# Patient Record
Sex: MALE | Race: BLACK OR AFRICAN AMERICAN | Employment: UNEMPLOYED | URBAN - METROPOLITAN AREA
[De-identification: names, ages, dates, MRNs, and addresses within clinical notes are randomized per-mention and may not be internally consistent; named-entity substitution may affect disease eponyms.]

---

## 2017-02-21 ENCOUNTER — OFFICE VISIT (OUTPATIENT)
Dept: INTERNAL MEDICINE CLINIC | Age: 52
End: 2017-02-21

## 2017-02-21 VITALS
DIASTOLIC BLOOD PRESSURE: 85 MMHG | OXYGEN SATURATION: 99 % | HEIGHT: 66 IN | HEART RATE: 74 BPM | BODY MASS INDEX: 27.16 KG/M2 | WEIGHT: 169 LBS | TEMPERATURE: 98.5 F | SYSTOLIC BLOOD PRESSURE: 131 MMHG | RESPIRATION RATE: 16 BRPM

## 2017-02-21 DIAGNOSIS — Z79.01 ANTICOAGULANT LONG-TERM USE: Primary | ICD-10-CM

## 2017-02-21 DIAGNOSIS — I10 ESSENTIAL HYPERTENSION: ICD-10-CM

## 2017-02-21 DIAGNOSIS — Z86.718 HISTORY OF DVT (DEEP VEIN THROMBOSIS): ICD-10-CM

## 2017-02-21 DIAGNOSIS — Z51.81 SUBTHERAPEUTIC ANTICOAGULATION: ICD-10-CM

## 2017-02-21 DIAGNOSIS — M79.604 PAIN OF RIGHT LOWER EXTREMITY: ICD-10-CM

## 2017-02-21 DIAGNOSIS — Z79.01 SUBTHERAPEUTIC ANTICOAGULATION: ICD-10-CM

## 2017-02-21 DIAGNOSIS — Z91.199 NONCOMPLIANCE: ICD-10-CM

## 2017-02-21 LAB
D DIMER PPP FEU-MCNC: <0.2 MG/L FEU (ref 0–0.49)
INR BLD: 1.8
PT POC: NORMAL SEC
VALID INTERNAL CONTROL?: YES

## 2017-02-21 NOTE — PROGRESS NOTES
Chief Complaint   Patient presents with    Anticoagulation     6mo f/u (Rm #7)    Leg Pain     right leg pain     Results for orders placed or performed in visit on 02/21/17   AMB POC PT/INR   Result Value Ref Range    VALID INTERNAL CONTROL POC Yes     Prothrombin time (POC)  sec    INR POC 1.8

## 2017-02-21 NOTE — PROGRESS NOTES
Chief Complaint   Patient presents with    Anticoagulation     6mo f/u (Rm #7)    Leg Pain     right leg pain           Subjective:   Conchita Martin 46 y.o.  male with a  past medical history reviewed see below. Here for f/up long overdue appt due to transporation issues  He has been using his coumadin and has pain fronm walkign to the bus stopo and pain in the right side present for three months   He is using his coumadin daily but is not sure of the dosage using 6 mg daily no missed doeses no cp no sob no abd pain    ROS: otherwise feeling generally well. All other systems reviewed and are negative      Current Outpatient Prescriptions   Medication Sig Dispense Refill    zonisamide (ZONEGRAN) 100 mg capsule TAKE 2 CAPSULES BY MOUTH TWICE A DAY 60 Cap 4    warfarin (COUMADIN) 1 mg tablet As directed 100 Tab 11    warfarin (COUMADIN) 5 mg tablet Use 5 mg 60 Tab 11    divalproex DR (DEPAKOTE) 500 mg tablet 1 in am and 1 in the pm 60 Tab 4    lisinopril (PRINIVIL, ZESTRIL) 10 mg tablet Take 1 Tab by mouth daily. 30 Tab 4    levETIRAcetam 1,000 mg tablet 1000 mg in the am and 500mg in the pm 90 Tab 4    divalproex DR (DEPAKOTE) 250 mg tablet 1 po QHS 30 Tab 4    tadalafil (CIALIS) 5 mg tablet Take 1 Tab by mouth as needed. 30 Tab 0    oxyCODONE-acetaminophen (PERCOCET) 5-325 mg per tablet Take 1 Tab by mouth two (2) times daily as needed for Pain. Max Daily Amount: 2 Tabs. 60 Tab 0    pramoxine-hydrocortisone (PROTOFOAM-HC) 2.5-1 % topical cream Apply  to affected area three (3) times daily.  10 g 0     No Known Allergies  Past Medical History:   Diagnosis Date    DVT (deep venous thrombosis) ([facility] 7/2/2013    Presence of IVC filter     Pulmonary embolism (HCC) 7/4/2013    Seizures (HCC)     monthly seizures- sometimes several per month- managed by PCP at this time, per caregiver    Trauma     hit in head wiht board playing [employer] Machine      Past Surgical History:   Procedure Laterality Date    HX CRANIOTOMY  2006    Trauma repair    VASCULAR SURGERY PROCEDURE UNLIST  2013    IVC filter     Family History   Problem Relation Age of Onset    Alcohol abuse Father 21      of hepatic cirrhosis in his 45s. Was drinking 4-5 bottles of wine daily.  Hypertension Mother 61    COPD Mother     Sleep Apnea Mother      Social History   Substance Use Topics    Smoking status: Never Smoker    Smokeless tobacco: Never Used    Alcohol use 5.0 oz/week     10 Cans of beer per week          Objective:     Visit Vitals    /85 (BP 1 Location: Left arm, BP Patient Position: Sitting)    Pulse 74    Temp 98.5 °F (36.9 °C) (Oral)    Resp 16    Ht 5' 6\" (1.676 m)    Wt 169 lb (76.7 kg)    SpO2 99%    BMI 27.28 kg/m2     Gen: NAD, pleasant  HEENT: normal appearing head, nares patent, PERRLA, EOMI, oropharynx no erythema, no cervical lymphadenopathy neck supple   Cardio: RRR nl S1S2 no murmur  Lungs CTAB no wheeze no rales no rhonchi  ABD Soft non tender non distended + bowel sounds  Extremities: full ROM X 4 no clubbing no cyanosis  Neuro: no gross focal deficits noted, alert and orientated X 3  Psych.: well groomed no outward signs of depression. Assessment/Plan:   Yuri Galo was seen today for anticoagulation and leg pain.     Diagnoses and all orders for this visit:    Anticoagulant long-term use  -     AMB POC PT/INR  -     COLLECTION CAPILLARY BLOOD SPECIMEN  -     LIPID PANEL  -     CBC WITH AUTOMATED DIFF  -     HEMOGLOBIN A1C WITH EAG    Pain of right lower extremity  -     D DIMER  -     LIPID PANEL  -     CBC WITH AUTOMATED DIFF  -     HEMOGLOBIN A1C WITH EAG    History of DVT (deep vein thrombosis)  -     D DIMER  -     LIPID PANEL  -     CBC WITH AUTOMATED DIFF  -     HEMOGLOBIN A1C WITH EAG    Essential hypertension  -     LIPID PANEL  -     CBC WITH AUTOMATED DIFF  -     HEMOGLOBIN A1C WITH EAG    Subtherapeutic anticoagulation    Noncompliance    Other orders  -     CVD REPORT      Follow-up Disposition:  Return in about 1 week (around 2/28/2017) for recheck pt/inr . .  avs printed and given to the pt. .  Home number verified   The patient voiced understanding of the above. Medication side effects were reviewed with the patient. Call with any concerns.

## 2017-02-21 NOTE — MR AVS SNAPSHOT
Visit Information Date & Time Provider Department Dept. Phone Encounter #  
 2/21/2017 12:15  [facility] [place], [address]  [place] [id number] Follow-up Instructions Return in about 1 week (around 2/28/2017) for recheck pt/inr . Upcoming Health Maintenance Date Due FOBT Q 1 YEAR AGE 50-75 11/8/2015 INFLUENZA AGE 9 TO ADULT 8/1/2016 DTaP/Tdap/Td series (2 - Td) 4/15/2025 Allergies as of 2/21/2017  Review Complete On: 2/21/2017 By: Dyana Mccormick LPN No Known Allergies Current Immunizations  Reviewed on 4/15/2015 Name Date Influenza Vaccine 11/25/2013 Influenza Vaccine Intradermal PF 11/2/2015, 10/6/2014 Tdap 4/15/2015, 6/9/2013 Not reviewed this visit You Were Diagnosed With   
  
 Codes Comments Anticoagulant long-term use    -  Primary ICD-10-CM: Z79.01 
ICD-9-CM: V58.61 Pain of right lower extremity     ICD-10-CM: M79.604 ICD-9-CM: 729.5 History of DVT (deep vein thrombosis)     ICD-10-CM: F78.042 ICD-9-CM: V12.51 Essential hypertension     ICD-10-CM: I10 
ICD-9-CM: 401.9 Subtherapeutic anticoagulation     ICD-10-CM: Z51.81, Z79.01 
ICD-9-CM: V58.83, V58.61 Vitals BP Pulse Temp Resp Height(growth percentile) Weight(growth percentile) 131/85 (BP 1 Location: Left arm, BP Patient Position: Sitting) 74 98.5 °F (36.9 °C) (Oral) 16 5' 6\" (1.676 m) 169 lb (76.7 kg) SpO2 BMI Smoking Status 99% 27.28 kg/m2 Never Smoker Vitals History BMI and BSA Data Body Mass Index Body Surface Area  
 27.28 kg/m 2 1.89 m 2 Preferred Pharmacy [facility]acy [facility] Phone [facility] [address] [place] [address] [phone] Your Updated Medication List  
  
   
This list is accurate as of: 2/21/17  1:09 PM.  Always use your most recent med list.  
  
  
  
  
 * divalproex  mg tablet Commonly known as:  DEPAKOTE  
1 in am and 1 in the pm  
  
 * divalproex  mg tablet Commonly known as:  DEPAKOTE  
1 po QHS  
  
 levETIRAcetam 1,000 mg tablet 1000 mg in the am and 500mg in the pm  
  
 lisinopril 10 mg tablet Commonly known as:  Rosi Renate Take 1 Tab by mouth daily. oxyCODONE-acetaminophen 5-325 mg per tablet Commonly known as:  PERCOCET Take 1 Tab by mouth two (2) times daily as needed for Pain. Max Daily Amount: 2 Tabs. pramoxine-hydrocortisone 2.5-1 % topical cream  
Commonly known as:  PROTOFOAM-HC Apply  to affected area three (3) times daily. tadalafil 5 mg tablet Commonly known as:  CIALIS Take 1 Tab by mouth as needed. * warfarin 1 mg tablet Commonly known as:  COUMADIN As directed * warfarin 5 mg tablet Commonly known as:  COUMADIN Use 5 mg  
  
 zonisamide 100 mg capsule Commonly known as:  ZONEGRAN  
TAKE 2 CAPSULES BY MOUTH TWICE A DAY * Notice: This list has 4 medication(s) that are the same as other medications prescribed for you. Read the directions carefully, and ask your doctor or other care provider to review them with you. We Performed the Following AMB POC PT/INR [98839 CPT(R)] CBC WITH AUTOMATED DIFF [19979 CPT(R)] COLLECTION CAPILLARY BLOOD SPECIMEN [94146 CPT(R)] D DIMER [id number] CPT(R)] HEMOGLOBIN A1C WITH EAG [01290 CPT(R)] LIPID PANEL [64571 CPT(R)] Follow-up Instructions Return in about 1 week (around 2/28/2017) for recheck pt/inr . Patient Instructions Take 6.5 mg of coumadin daily That means take 1 -5 mg pill And  1 and 1/2 of the 1 mg pill Introducing [facility]! Cassidy Gutierrez introduces [email or web address] patient portal. Now you can access parts of your medical record, email your doctor's office, and request medication refills online. 1. In your internet browser, go to [email or web address] 2. Click on the First Time User? Click Here link in the Sign In box. You will see the New Member Sign Up page. 3. Enter your [employer] Access Code exactly as it appears below. You will not need to use this code after youve completed the sign-up process. If you do not sign up before the expiration date, you must request a new code. · [email or web address] Access Code: [email or web address] Expires: 5/22/2017  1:09 PM 
 
4. Enter the last four digits of your Social Security Number (xxxx) and Date of Birth (mm/dd/yyyy) as indicated and click Submit. You will be taken to the next sign-up page. 5. Create a G[employer] ID. This will be your [employer] login ID and cannot be changed, so think of one that is secure and easy to remember. 6. Create a [employer] password. You can change your password at any time. 7. Enter your Password Reset Question and Answer. This can be used at a later time if you forget your password. 8. Enter your e-mail address. You will receive e-mail notification when new information is available in [address] 9. Click Sign Up. You can now view and download portions of your medical record. 10. Click the Download Summary menu link to download a portable copy of your medical information. If you have questions, please visit the Frequently Asked Questions section of the [email or web address] website. Remember, [employer] is NOT to be used for urgent needs. For medical emergencies, dial 911. Now available from your iPhone and Android! Please provide this summary of care documentation to your next provider. Your primary care clinician is listed as Arin Bar. If you have any questions after today's visit, please call [phone].

## 2017-02-22 LAB
BASOPHILS # BLD AUTO: 0 X10E3/UL (ref 0–0.2)
BASOPHILS NFR BLD AUTO: 0 %
CHOLEST SERPL-MCNC: 233 MG/DL (ref 100–199)
EOSINOPHIL # BLD AUTO: 0.1 X10E3/UL (ref 0–0.4)
EOSINOPHIL NFR BLD AUTO: 1 %
ERYTHROCYTE [DISTWIDTH] IN BLOOD BY AUTOMATED COUNT: 15 % (ref 12.3–15.4)
EST. AVERAGE GLUCOSE BLD GHB EST-MCNC: 94 MG/DL
HBA1C MFR BLD: 4.9 % (ref 4.8–5.6)
HCT VFR BLD AUTO: 43.7 % (ref 37.5–51)
HDLC SERPL-MCNC: 50 MG/DL
HGB BLD-MCNC: 14.7 G/DL (ref 12.6–17.7)
IMM GRANULOCYTES # BLD: 0 X10E3/UL (ref 0–0.1)
IMM GRANULOCYTES NFR BLD: 0 %
INTERPRETATION, 910389: NORMAL
LDLC SERPL CALC-MCNC: ABNORMAL MG/DL (ref 0–99)
LYMPHOCYTES # BLD AUTO: 3.1 X10E3/UL (ref 0.7–3.1)
LYMPHOCYTES NFR BLD AUTO: 35 %
MCH RBC QN AUTO: 30.2 PG (ref 26.6–33)
MCHC RBC AUTO-ENTMCNC: 33.6 G/DL (ref 31.5–35.7)
MCV RBC AUTO: 90 FL (ref 79–97)
MONOCYTES # BLD AUTO: 0.8 X10E3/UL (ref 0.1–0.9)
MONOCYTES NFR BLD AUTO: 9 %
NEUTROPHILS # BLD AUTO: 4.8 X10E3/UL (ref 1.4–7)
NEUTROPHILS NFR BLD AUTO: 55 %
PDF IMAGE, 910387: NORMAL
PLATELET # BLD AUTO: 241 X10E3/UL (ref 150–379)
RBC # BLD AUTO: 4.87 X10E6/UL (ref 4.14–5.8)
TRIGL SERPL-MCNC: 488 MG/DL (ref 0–149)
VLDLC SERPL CALC-MCNC: ABNORMAL MG/DL (ref 5–40)
WBC # BLD AUTO: 8.7 X10E3/UL (ref 3.4–10.8)

## 2017-03-23 ENCOUNTER — OFFICE VISIT (OUTPATIENT)
Dept: INTERNAL MEDICINE CLINIC | Age: 52
End: 2017-03-23

## 2017-03-23 VITALS
SYSTOLIC BLOOD PRESSURE: 124 MMHG | WEIGHT: 162 LBS | TEMPERATURE: 98.1 F | DIASTOLIC BLOOD PRESSURE: 81 MMHG | OXYGEN SATURATION: 99 % | HEART RATE: 115 BPM | BODY MASS INDEX: 26.03 KG/M2 | HEIGHT: 66 IN | RESPIRATION RATE: 18 BRPM

## 2017-03-23 DIAGNOSIS — I10 BENIGN ESSENTIAL HTN: ICD-10-CM

## 2017-03-23 DIAGNOSIS — R56.9 CONVULSIONS, UNSPECIFIED CONVULSION TYPE (HCC): Primary | ICD-10-CM

## 2017-03-23 DIAGNOSIS — Z79.01 ANTICOAGULANT LONG-TERM USE: ICD-10-CM

## 2017-03-23 DIAGNOSIS — Z91.14 NONCOMPLIANCE WITH MEDICATION REGIMEN: ICD-10-CM

## 2017-03-23 DIAGNOSIS — F79 MR (MENTAL RETARDATION): ICD-10-CM

## 2017-03-23 RX ORDER — DIVALPROEX SODIUM 500 MG/1
TABLET, DELAYED RELEASE ORAL
Qty: 60 TAB | Refills: 4 | Status: SHIPPED | OUTPATIENT
Start: 2017-03-23 | End: 2017-08-09 | Stop reason: SDUPTHER

## 2017-03-23 RX ORDER — LISINOPRIL 10 MG/1
10 TABLET ORAL DAILY
Qty: 30 TAB | Refills: 4 | Status: SHIPPED | OUTPATIENT
Start: 2017-03-23 | End: 2017-08-09 | Stop reason: SDUPTHER

## 2017-03-23 RX ORDER — DIVALPROEX SODIUM 250 MG/1
TABLET, DELAYED RELEASE ORAL
Qty: 30 TAB | Refills: 4 | Status: SHIPPED | OUTPATIENT
Start: 2017-03-23 | End: 2017-09-09 | Stop reason: SDUPTHER

## 2017-03-23 RX ORDER — WARFARIN 1 MG/1
TABLET ORAL
Qty: 100 TAB | Refills: 11 | Status: SHIPPED | OUTPATIENT
Start: 2017-03-23 | End: 2018-01-02 | Stop reason: CLARIF

## 2017-03-23 RX ORDER — WARFARIN SODIUM 5 MG/1
TABLET ORAL
Qty: 60 TAB | Refills: 11 | Status: SHIPPED | OUTPATIENT
Start: 2017-03-23 | End: 2018-01-02 | Stop reason: CLARIF

## 2017-03-23 RX ORDER — ZONISAMIDE 100 MG/1
CAPSULE ORAL
Qty: 60 CAP | Refills: 4 | Status: SHIPPED | OUTPATIENT
Start: 2017-03-23 | End: 2017-08-20 | Stop reason: SDUPTHER

## 2017-03-23 RX ORDER — LEVETIRACETAM 1000 MG/1
TABLET ORAL
Qty: 90 TAB | Refills: 4 | Status: SHIPPED | OUTPATIENT
Start: 2017-03-23 | End: 2017-05-17 | Stop reason: SDUPTHER

## 2017-03-23 NOTE — PROGRESS NOTES
Chief Complaint   Patient presents with    Anticoagulation     (RM6) INR 2.9    Medication Refill     Devalproex, Lisinopril,Zonisamide           Subjective:   Aashish Gresham 46 y.o.  male with a  past medical history reviewed see below. Here for pt/inr he ran out of his coumdain thie am he states he was not allowed to get th e1 mg  a refill on his coumdin   He has a 5 mg tab and is taking it in the am again even after multiple discussion about taking it in the afternoon   He is out of one of his sz medication and had a sz on Sunday and bit his tougue and had some incontinuce   Denies sob no leg pain   No cp  No rectal bleeding now but did when he ate hot sauce a few days ago. He still has not been to a GI doctor -multiple referrlas done in the past pt is not complaint   ROS: otherwise feeling generally well. All other systems reviewed and are negative      Current Outpatient Prescriptions   Medication Sig Dispense Refill    zonisamide (ZONEGRAN) 100 mg capsule TAKE 2 CAPSULES BY MOUTH TWICE A DAY 60 Cap 4    warfarin (COUMADIN) 1 mg tablet As directed 100 Tab 11    warfarin (COUMADIN) 5 mg tablet Use 5 mg 60 Tab 11    divalproex DR (DEPAKOTE) 500 mg tablet 1 in am and 1 in the pm 60 Tab 4    lisinopril (PRINIVIL, ZESTRIL) 10 mg tablet Take 1 Tab by mouth daily. 30 Tab 4    levETIRAcetam 1,000 mg tablet 1000 mg in the am and 500mg in the pm 90 Tab 4    divalproex DR (DEPAKOTE) 250 mg tablet 1 po QHS 30 Tab 4    tadalafil (CIALIS) 5 mg tablet Take 1 Tab by mouth as needed. 30 Tab 0    oxyCODONE-acetaminophen (PERCOCET) 5-325 mg per tablet Take 1 Tab by mouth two (2) times daily as needed for Pain. Max Daily Amount: 2 Tabs. 60 Tab 0    pramoxine-hydrocortisone (PROTOFOAM-HC) 2.5-1 % topical cream Apply  to affected area three (3) times daily.  10 g 0     No Known Allergies  Past Medical History:   Diagnosis Date    [facility]    Presence of IVC filter     Pulmonary embolism ([facility] [address] 2013    [facility]     monthly seizures- sometimes several per month- managed by PCP at this time, per caregiver    Trauma     hit in head wiht board playing dominos      Past Surgical History:   Procedure Laterality Date    HX CRANIOTOMY  2006    Trauma repair    VASCULAR SURGERY PROCEDURE UNLIST  2013    IVC filter     Family History   Problem Relation Age of Onset    Alcohol abuse Father 21      of hepatic cirrhosis in his 45s. Was drinking 4-5 bottles of wine daily.  Hypertension Mother 61    COPD Mother     Sleep Apnea Mother      Social History   Substance Use Topics    Smoking status: Never Smoker    Smokeless tobacco: Never Used    Alcohol use 5.0 oz/week     10 Cans of beer per week          Objective:     Visit Vitals    /81 (BP 1 Location: Right arm, BP Patient Position: Sitting)    Pulse (!) 115    Temp 98.1 °F (36.7 °C) (Oral)    Resp 18    Ht 5' 6\" (1.676 m)    Wt 162 lb (73.5 kg)    SpO2 99%    BMI 26.15 kg/m2     Gen: NAD, pleasant  HEENT: normal appearing head, nares patent, PERRLA, EOMI, oropharynx no erythema, no cervical lymphadenopathy neck supple   Cardio: RRR nl S1S2 no murmur  Lungs CTAB no wheeze no rales no rhonchi  ABD Soft non tender non distended + bowel sounds  Extremities: full ROM X 4 no clubbing no cyanosis no leg tenderenss   Neuro: no gross focal deficits noted, alert and orientated X 3  Psych.: well groomed no outward signs of depression. Assessment/Plan:   Mycahl Mosley was seen today for anticoagulation and medication refill. Diagnoses and all orders for this visit:    Convulsions, unspecified convulsion type ([facility] [address]  -     levETIRAcetam 1,000 mg tablet; 1000 mg in the am and 500mg in the pm    Anticoagulant long-term use  -     warfarin (COUMADIN) 1 mg tablet;  As directed  -     warfarin (COUMADIN) 5 mg tablet; Use 5 mg  -     AMB POC PT/INR    Benign essential HTN    Noncompliance with medication regimen    MR (mental retardation)    Other orders  -     divalproex DR (DEPAKOTE) 250 mg tablet; 1 po QHS  -     lisinopril (PRINIVIL, ZESTRIL) 10 mg tablet; Take 1 Tab by mouth daily. -     zonisamide (ZONEGRAN) 100 mg capsule; TAKE 2 CAPSULES BY MOUTH TWICE A DAY  -     divalproex DR (DEPAKOTE) 500 mg tablet; 1 in am and 1 in the pm      Follow-up Disposition:  Return in about 2 weeks (around 4/6/2017) for recheck pt/inr . .  avs printed and given to the pt. .  Continue coumadin at 5 mg daily and recheck in 2 weeks f/up with gi as previously directed   The patient voiced understanding of the above. Medication side effects were reviewed with the patient. Call with any concerns.

## 2017-03-23 NOTE — PROGRESS NOTES
Chief Complaint   Patient presents with    Anticoagulation     (RM6) INR    Medication Refill     Devalproex, Lisinopril,Zonisamide

## 2017-03-23 NOTE — MR AVS SNAPSHOT
Visit Information Date & Time Provider Department Dept. Phone Encounter #  
 3/23/2017  8:15 AM [facility], [address] [place] [id number] Follow-up Instructions Return in about 2 weeks (around 4/6/2017) for recheck pt/inr . Upcoming Health Maintenance Date Due FOBT Q 1 YEAR AGE 50-75 11/8/2015 INFLUENZA AGE 9 TO ADULT 8/1/2016 DTaP/Tdap/Td series (2 - Td) 4/15/2025 Allergies as of 3/23/2017  Review Complete On: 3/23/2017 By: Sari Mascorro No Known Allergies Current Immunizations  Reviewed on 4/15/2015 Name Date Influenza Vaccine 11/25/2013 Influenza Vaccine Intradermal PF 11/2/2015, 10/6/2014 Tdap 4/15/2015, 6/9/2013 Not reviewed this visit You Were Diagnosed With   
  
 Codes Comments Convulsions, unspecified convulsion type ([facility]    -  Primary ICD-10-CM: R56.9 ICD-9-CM: 780.39 Anticoagulant long-term use     ICD-10-CM: Z79.01 
ICD-9-CM: V58.61 Benign essential HTN     ICD-10-CM: I10 
ICD-9-CM: 401.1 Noncompliance with medication regimen     ICD-10-CM: Z91.14 
ICD-9-CM: V15.81   
 MR (mental retardation)     ICD-10-CM: M85 
ICD-9-CM: 623 Vitals BP Pulse Temp Resp Height(growth percentile) Weight(growth percentile) 124/81 (BP 1 Location: Right arm, BP Patient Position: Sitting) (!) 115 98.1 °F (36.7 °C) (Oral) 18 5' 6\" (1.676 m) 162 lb (73.5 kg) SpO2 BMI Smoking Status 99% 26.15 kg/m2 Never Smoker BMI and BSA Data Body Mass Index Body Surface Area  
 26.15 kg/m 2 1.85 m 2 Preferred Pharmacy [facility] Phone [facility] [address] [phone] Your Updated Medication List  
  
   
This list is accurate as of: 3/23/17 10:12 AM.  Always use your most recent med list.  
  
  
  
  
 * divalproex  mg tablet Commonly known as:  DEPAKOTE  
1 po QHS * divalproex  mg tablet Commonly known as:  DEPAKOTE  
1 in am and 1 in the pm  
  
 levETIRAcetam 1,000 mg tablet 1000 mg in the am and 500mg in the pm  
  
 lisinopril 10 mg tablet Commonly known as:  Aundra Julia Take 1 Tab by mouth daily. oxyCODONE-acetaminophen 5-325 mg per tablet Commonly known as:  PERCOCET Take 1 Tab by mouth two (2) times daily as needed for Pain. Max Daily Amount: 2 Tabs. pramoxine-hydrocortisone 2.5-1 % topical cream  
Commonly known as:  PROTOFOAM-HC Apply  to affected area three (3) times daily. tadalafil 5 mg tablet Commonly known as:  CIALIS Take 1 Tab by mouth as needed. * warfarin 1 mg tablet Commonly known as:  COUMADIN As directed * warfarin 5 mg tablet Commonly known as:  COUMADIN Use 5 mg  
  
 zonisamide 100 mg capsule Commonly known as:  ZONEGRAN  
TAKE 2 CAPSULES BY MOUTH TWICE A DAY * Notice: This list has 4 medication(s) that are the same as other medications prescribed for you. Read the directions carefully, and ask your doctor or other care provider to review them with you. Prescriptions Sent to Pharmacy Refills  
 divalproex DR (DEPAKOTE) 250 mg tablet 4 Si po QHS Class: Normal  
 Pharmacy: [address] [place] [address] Ph #: [phone]  
 warfarin (COUMADIN) 1 mg tablet 11 Sig: As directed Class: Normal  
 Pharmacy: [address] [place] [address] Ph #: [phone]  
 warfarin (COUMADIN) 5 mg tablet 11 Sig: Use 5 mg Class: Normal  
 Pharmacy: [address] [place] [address] Ph #: [phone]  
 lisinopril (PRINIVIL, ZESTRIL) 10 mg tablet 4 Sig: Take 1 Tab by mouth daily. Class: Normal  
 Pharmacy: [address] [place] [address] Ph #: [phone]  Route: Oral  
 zonisamide (ZONEGRAN) 100 mg capsule 4 Sig: TAKE 2 CAPSULES BY MOUTH TWICE A DAY Class: Normal  
 Pharmacy: [address] [place] [address] Ph #: [phone]  
 levETIRAcetam 1,000 mg tablet 4 Si mg in the am and 500mg in the pm  
 Class: Normal  
 Pharmacy: [address] [place] [address] Ph #: [phone]  
 divalproex DR (DEPAKOTE) 500 mg tablet 4 Si in am and 1 in the pm  
 Class: Normal  
 Pharmacy: [address] [place] [address] Ph #: [phone] We Performed the Following AMB POC PT/INR [79293 CPT(R)] Follow-up Instructions Return in about 2 weeks (around 2017) for recheck pt/inr . Patient Instructions Continue coumadin at 5mg daily and return for a recheck in two weeks Introducing [facility]! Dusty De Leon introduces [employer] patient portal. Now you can access parts of your medical record, email your doctor's office, and request medication refills online. 1. In your internet browser, go to [email or web address] Linkt 2. Click on the First Time User? Click Here link in the Sign In box. You will see the New Member Sign Up page. 3. Enter your [employer] Access Code exactly as it appears below. You will not need to use this code after youve completed the sign-up process. If you do not sign up before the expiration date, you must request a new code. · [employer] Access Code: [email or web address] Expires: 2017  2:09 PM 
 
4. Enter the last four digits of your Social Security Number (xxxx) and Date of Birth (mm/dd/yyyy) as indicated and click Submit. You will be taken to the next sign-up page. 5. Create a [employer] ID. This will be your [employer] login ID and cannot be changed, so think of one that is secure and easy to remember. 6. Create a [employer] password. You can change your password at any time. 7. Enter your Password Reset Question and Answer. This can be used at a later time if you forget your password. 8. Enter your e-mail address. You will receive e-mail notification when new information is available in [address] 9. Click Sign Up. You can now view and download portions of your medical record. 10. Click the Download Summary menu link to download a portable copy of your medical information. If you have questions, please visit the Frequently Asked Questions section of the [employer] website. Remember, [employer] is NOT to be used for urgent needs. For medical emergencies, dial 911. Now available from your iPhone and Android! Please provide this summary of care documentation to your next provider. Your primary care clinician is listed as Rosanna Calles. If you have any questions after today's visit, please call [phone].

## 2017-04-13 ENCOUNTER — OFFICE VISIT (OUTPATIENT)
Dept: INTERNAL MEDICINE CLINIC | Age: 52
End: 2017-04-13

## 2017-04-13 VITALS
HEIGHT: 66 IN | SYSTOLIC BLOOD PRESSURE: 116 MMHG | HEART RATE: 83 BPM | WEIGHT: 164.9 LBS | DIASTOLIC BLOOD PRESSURE: 74 MMHG | TEMPERATURE: 98.3 F | OXYGEN SATURATION: 100 % | BODY MASS INDEX: 26.5 KG/M2 | RESPIRATION RATE: 16 BRPM

## 2017-04-13 DIAGNOSIS — I82.401 DEEP VEIN THROMBOSIS (DVT) OF RIGHT LOWER EXTREMITY, UNSPECIFIED CHRONICITY, UNSPECIFIED VEIN (HCC): ICD-10-CM

## 2017-04-13 DIAGNOSIS — M79.604 RIGHT LEG PAIN: Primary | ICD-10-CM

## 2017-04-13 DIAGNOSIS — Z51.81 SUBTHERAPEUTIC ANTICOAGULATION: ICD-10-CM

## 2017-04-13 DIAGNOSIS — Z79.01 SUBTHERAPEUTIC ANTICOAGULATION: ICD-10-CM

## 2017-04-13 LAB
INR BLD: 1.9
PT POC: NORMAL SEC
VALID INTERNAL CONTROL?: YES

## 2017-04-13 NOTE — MR AVS SNAPSHOT
Visit Information Date & Time Provider Department Dept. Phone Encounter #  
 4/13/2017  2:45 PM Marisel Torres, [address]  Claire[place]ons [id number] Follow-up Instructions Return in about 4 days (around 4/17/2017) for pt inr 15 min routine work in please ok to double book an am appt . Upcoming Health Maintenance Date Due FOBT Q 1 YEAR AGE 50-75 11/8/2015 INFLUENZA AGE 9 TO ADULT 8/1/2016 DTaP/Tdap/Td series (2 - Td) 4/15/2025 Allergies as of 4/13/2017  Review Complete On: 4/13/2017 By: Faye Gama No Known Allergies Current Immunizations  Reviewed on 4/15/2015 Name Date Influenza Vaccine 11/25/2013 Influenza Vaccine Intradermal PF 11/2/2015, 10/6/2014 Tdap 4/15/2015, 6/9/2013 Not reviewed this visit You Were Diagnosed With   
  
 Codes Comments Right leg pain    -  Primary ICD-10-CM: M79.604 ICD-9-CM: 729.5 Deep vein thrombosis (DVT) of right lower extremity, unspecified chronicity, unspecified vein (HCC)     ICD-10-CM: I82.401 ICD-9-CM: 453.40 Subtherapeutic anticoagulation     ICD-10-CM: Z51.81, Z79.01 
ICD-9-CM: V58.83, V58.61 Vitals BP Pulse Temp Resp Height(growth percentile) Weight(growth percentile) 116/74 (BP 1 Location: Right arm, BP Patient Position: Sitting) 83 98.3 °F (36.8 °C) (Oral) 16 5' 6\" (1.676 m) 164 lb 14.4 oz (74.8 kg) SpO2 BMI Smoking Status 100% 26.62 kg/m2 Never Smoker BMI and BSA Data Body Mass Index Body Surface Area  
 26.62 kg/m 2 1.87 m 2 Preferred Pharmacy [facility] Phone [facility] [address] [place] [address] [phone] Your Updated Medication List  
  
   
This list is accurate as of: 4/13/17  3:39 PM.  Always use your most recent med list.  
  
  
  
  
 * divalproex  mg tablet Commonly known as:  DEPAKOTE  
1 po QHS * divalproex  mg tablet Commonly known as:  DEPAKOTE  
1 in am and 1 in the pm  
  
 levETIRAcetam 1,000 mg tablet 1000 mg in the am and 500mg in the pm  
  
 lisinopril 10 mg tablet Commonly known as:  Jorje Banner Take 1 Tab by mouth daily. oxyCODONE-acetaminophen 5-325 mg per tablet Commonly known as:  PERCOCET Take 1 Tab by mouth two (2) times daily as needed for Pain. Max Daily Amount: 2 Tabs. pramoxine-hydrocortisone 2.5-1 % topical cream  
Commonly known as:  PROTOFOAM-HC Apply  to affected area three (3) times daily. tadalafil 5 mg tablet Commonly known as:  CIALIS Take 1 Tab by mouth as needed. * warfarin 1 mg tablet Commonly known as:  COUMADIN As directed * warfarin 5 mg tablet Commonly known as:  COUMADIN Use 5 mg  
  
 zonisamide 100 mg capsule Commonly known as:  ZONEGRAN  
TAKE 2 CAPSULES BY MOUTH TWICE A DAY * Notice: This list has 4 medication(s) that are the same as other medications prescribed for you. Read the directions carefully, and ask your doctor or other care provider to review them with you. We Performed the Following AMB POC PT/INR [70849 CPT(R)] Follow-up Instructions Return in about 4 days (around 4/17/2017) for pt inr 15 min routine work in please ok to double book an am appt . To-Do List   
 04/13/2017 Imaging:  DUPLEX LOWER EXT VENOUS RIGHT Patient Instructions Please take your coumadin every day today take 7 mg then restart 6.5 mg every day Introducing [facility] & HEALTH SERVICES! Zulay Mcintosh introduces [employer] patient portal. Now you can access parts of your medical record, email your doctor's office, and request medication refills online. 1. In your internet browser, go to [email or web address] 2. Click on the First Time User? Click Here link in the Sign In box. You will see the New Member Sign Up page. 3. Enter your [employer] Access Code exactly as it appears below. You will not need to use this code after youve completed the sign-up process. If you do not sign up before the expiration date, you must request a new code. · [employer] Access Code: [email or web address] Expires: 5/22/2017  2:09 PM 
 
4. Enter the last four digits of your Social Security Number (xxxx) and Date of Birth (mm/dd/yyyy) as indicated and click Submit. You will be taken to the next sign-up page. 5. Create a [employer] ID. This will be your [employer] login ID and cannot be changed, so think of one that is secure and easy to remember. 6. Create a [employer] password. You can change your password at any time. 7. Enter your Password Reset Question and Answer. This can be used at a later time if you forget your password. 8. Enter your e-mail address. You will receive e-mail notification when new information is available in [address] 9. Click Sign Up. You can now view and download portions of your medical record. 10. Click the Download Summary menu link to download a portable copy of your medical information. If you have questions, please visit the Frequently Asked Questions section of the [employer] website. Remember, [employer] is NOT to be used for urgent needs. For medical emergencies, dial 911. Now available from your iPhone and Android! Please provide this summary of care documentation to your next provider. Your primary care clinician is listed as Olivia Ramirez. If you have any questions after today's visit, please call [phone].

## 2017-04-13 NOTE — PROGRESS NOTES
C/c PT/INR  Subjective:   Luz Jurado 46 y.o.  male with a  past medical history reviewed see below. presents today for Anticoagulation monitoring. Indication: DVT and PE  INR Goal: 2.0-3.0. Current dose:  Coumadin 6.5 mg daily. Missed Coumadin Doses: This week missed yesterday denies missing any other days   Medication Changes:  no  Dietary Changes:  no    Symptoms: taking coumadin appropriately without any bleeding. Latest INRs:  Lab Results   Component Value Date/Time    INR 2.0 02/08/2014 02:15 PM    INR 1.1 07/04/2013 02:30 PM    INR POC 2.8 04/17/2017 10:12 AM    INR POC 1.9 04/13/2017 03:22 PM    INR POC 1.8 02/21/2017 12:57 PM    Prothrombin time 19.5 02/08/2014 02:15 PM    Prothrombin time 11.3 07/04/2013 02:30 PM        ROS:. All other systems reviewed and are negative      Current Outpatient Prescriptions   Medication Sig Dispense Refill    divalproex DR (DEPAKOTE) 250 mg tablet 1 po QHS 30 Tab 4    warfarin (COUMADIN) 1 mg tablet As directed 100 Tab 11    warfarin (COUMADIN) 5 mg tablet Use 5 mg 60 Tab 11    lisinopril (PRINIVIL, ZESTRIL) 10 mg tablet Take 1 Tab by mouth daily. 30 Tab 4    zonisamide (ZONEGRAN) 100 mg capsule TAKE 2 CAPSULES BY MOUTH TWICE A DAY 60 Cap 4    levETIRAcetam 1,000 mg tablet 1000 mg in the am and 500mg in the pm 90 Tab 4    divalproex DR (DEPAKOTE) 500 mg tablet 1 in am and 1 in the pm 60 Tab 4    tadalafil (CIALIS) 5 mg tablet Take 1 Tab by mouth as needed. 30 Tab 0    oxyCODONE-acetaminophen (PERCOCET) 5-325 mg per tablet Take 1 Tab by mouth two (2) times daily as needed for Pain. Max Daily Amount: 2 Tabs. 60 Tab 0    pramoxine-hydrocortisone (PROTOFOAM-HC) 2.5-1 % topical cream Apply  to affected area three (3) times daily.  10 g 0     No Known Allergies  Past Medical History:   Diagnosis Date    DVT (deep venous thrombosis) ([facility] [facility] 7/2/2013    Presence of IVC filter     Pulmonary embolism ([address] 7/4/2013    Seizures ([facility]     monthly seizures- sometimes several per month- managed by PCP at this time, per caregiver    Trauma     hit in head wiht board playing [employer]      Past Surgical History:   Procedure Laterality Date    HX CRANIOTOMY  2006    Trauma repair   [employer] VASCULAR SURGERY PROCEDURE UNLIST  2013    IVC filter     Family History   Problem Relation Age of Onset    Alcohol abuse Father 21      of hepatic cirrhosis in his 45s. Was drinking 4-5 bottles of wine daily.  Hypertension Mother 61    COPD Mother     Sleep Apnea Mother      Social History   Substance Use Topics    Smoking status: Never Smoker    Smokeless tobacco: Never Used    Alcohol use 5.0 oz/week     10 Cans of beer per week          Objective:     Visit Vitals    /74 (BP 1 Location: Right arm, BP Patient Position: Sitting)    Pulse 83    Temp 98.3 °F (36.8 °C) (Oral)    Resp 16    Ht 5' 6\" (1.676 m)    Wt 164 lb 14.4 oz (74.8 kg)    SpO2 100%    BMI 26.62 kg/m2     Gen: NAD, pleasant  Cardio: RRR nl S1 S2 no  murmur  Lungs CTAB no wheeze no rales no rhonchi  Skin:no bruising noted no calf tenderness     Assessment/Plan:   John Paul Milligan was seen today for anticoagulation. Diagnoses and all orders for this visit:    Right leg pain  -     DUPLEX LOWER EXT VENOUS RIGHT; Future    Deep vein thrombosis (DVT) of right lower extremity, unspecified chronicity, unspecified vein (HCC)  -     AMB POC PT/INR    Subtherapeutic anticoagulation  -     DUPLEX LOWER EXT VENOUS RIGHT; Future      Follow-up Disposition:  Return in about 4 days (around 2017) for pt inr 15 min routine work in please ok to double book an am appt . Sidney Barthel The patient voiced understanding of the above. Medication side effects were reviewed with the patient. Call with any concerns.

## 2017-04-17 ENCOUNTER — OFFICE VISIT (OUTPATIENT)
Dept: INTERNAL MEDICINE CLINIC | Age: 52
End: 2017-04-17

## 2017-04-17 VITALS
BODY MASS INDEX: 25.83 KG/M2 | RESPIRATION RATE: 18 BRPM | DIASTOLIC BLOOD PRESSURE: 73 MMHG | OXYGEN SATURATION: 100 % | HEIGHT: 66 IN | TEMPERATURE: 98.4 F | WEIGHT: 160.7 LBS | HEART RATE: 87 BPM | SYSTOLIC BLOOD PRESSURE: 110 MMHG

## 2017-04-17 DIAGNOSIS — Z51.81 ANTICOAGULATION MANAGEMENT ENCOUNTER: Primary | ICD-10-CM

## 2017-04-17 DIAGNOSIS — T50.901A ACCIDENTAL MEDICATION ERROR, INITIAL ENCOUNTER: ICD-10-CM

## 2017-04-17 DIAGNOSIS — Z79.01 ANTICOAGULATION MANAGEMENT ENCOUNTER: Primary | ICD-10-CM

## 2017-04-17 LAB
INR BLD: 2.8
PT POC: NORMAL SEC
VALID INTERNAL CONTROL?: YES

## 2017-04-17 NOTE — MR AVS SNAPSHOT
Visit Information Date & Time Provider Department Dept. Phone Encounter #  
 4/17/2017 10:00  [facility] [place], [address] [address] [place] [id number] Follow-up Instructions Return in about 2 weeks (around 5/1/2017) for recheck pt/inr 15 min . Upcoming Health Maintenance Date Due FOBT Q 1 YEAR AGE 50-75 11/8/2015 INFLUENZA AGE 9 TO ADULT 8/1/2016 DTaP/Tdap/Td series (2 - Td) 4/15/2025 Allergies as of 4/17/2017  Review Complete On: 4/17/2017 By: Yumiko Paulino No Known Allergies Current Immunizations  Reviewed on 4/15/2015 Name Date Influenza Vaccine 11/25/2013 Influenza Vaccine Intradermal PF 11/2/2015, 10/6/2014 Tdap 4/15/2015, 6/9/2013 Not reviewed this visit You Were Diagnosed With   
  
 Codes Comments Anticoagulation management encounter    -  Primary ICD-10-CM: Z51.81, Z79.01 
ICD-9-CM: V58.83, V58.61 Accidental medication error, initial encounter     ICD-10-CM: Arjulia Basil ICD-9-CM: 977.9, E858.9 Vitals BP Pulse Temp Resp Height(growth percentile) Weight(growth percentile) 110/73 (BP 1 Location: Left arm, BP Patient Position: Sitting) 87 98.4 °F (36.9 °C) (Oral) 18 5' 6\" (1.676 m) 160 lb 11.2 oz (72.9 kg) SpO2 BMI Smoking Status 100% 25.94 kg/m2 Never Smoker BMI and BSA Data Body Mass Index Body Surface Area  
 25.94 kg/m 2 1.84 m 2 Preferred Pharmacy [facility] [address] [place] [address] [phone] Your Updated Medication List  
  
   
This list is accurate as of: 4/17/17 10:29 AM.  Always use your most recent med list.  
  
  
  
  
 * divalproex  mg tablet Commonly known as:  DEPAKOTE  
1 po QHS * divalproex  mg tablet Commonly known as:  DEPAKOTE  
1 in am and 1 in the pm  
  
 levETIRAcetam 1,000 mg tablet 1000 mg in the am and 500mg in the pm  
  
 lisinopril 10 mg tablet Commonly known as:  Belen Gravel Take 1 Tab by mouth daily. oxyCODONE-acetaminophen 5-325 mg per tablet Commonly known as:  PERCOCET Take 1 Tab by mouth two (2) times daily as needed for Pain. Max Daily Amount: 2 Tabs. pramoxine-hydrocortisone 2.5-1 % topical cream  
Commonly known as:  PROTOFOAM-HC Apply  to affected area three (3) times daily. tadalafil 5 mg tablet Commonly known as:  CIALIS Take 1 Tab by mouth as needed. * warfarin 1 mg tablet Commonly known as:  COUMADIN As directed * warfarin 5 mg tablet Commonly known as:  COUMADIN Use 5 mg  
  
 zonisamide 100 mg capsule Commonly known as:  ZONEGRAN  
TAKE 2 CAPSULES BY MOUTH TWICE A DAY * Notice: This list has 4 medication(s) that are the same as other medications prescribed for you. Read the directions carefully, and ask your doctor or other care provider to review them with you. We Performed the Following AMB POC PT/INR [82282 CPT(R)] Follow-up Instructions Return in about 2 weeks (around 5/1/2017) for recheck pt/inr 15 min . Patient Instructions Take 6 1/2 mg of coumadin daily so one  5 mg and 1 and 1/2 of the the 1 mg If you start to have more blood in your stool return sooner. Introducing [facility] & HEALTH SERVICES! Hollie Guevara introduces [employer] patient portal. Now you can access parts of your medical record, email your doctor's office, and request medication refills online. 1. In your internet browser, go to [email or web address] 2. Click on the First Time User? Click Here link in the Sign In box. You will see the New Member Sign Up page. 3. Enter your [employer] Access Code exactly as it appears below. You will not need to use this code after youve completed the sign-up process. If you do not sign up before the expiration date, you must request a new code. · [employer] Access Code: [email or web address] Expires: 5/22/2017  2:09 PM 
 
4. Enter the last four digits of your Social Security Number (xxxx) and Date of Birth (mm/dd/yyyy) as indicated and click Submit. You will be taken to the next sign-up page. 5. Create a [employer] ID. This will be your [employer] login ID and cannot be changed, so think of one that is secure and easy to remember. 6. Create a [employer] password. You can change your password at any time. 7. Enter your Password Reset Question and Answer. This can be used at a later time if you forget your password. 8. Enter your e-mail address. You will receive e-mail notification when new information is available in [address] 9. Click Sign Up. You can now view and download portions of your medical record. 10. Click the Download Summary menu link to download a portable copy of your medical information. If you have questions, please visit the Frequently Asked Questions section of the [employer] website. Remember, [employer] is NOT to be used for urgent needs. For medical emergencies, dial 911. Now available from your iPhone and Android! Please provide this summary of care documentation to your next provider. Your primary care clinician is listed as Sami Jones. If you have any questions after today's visit, please call [phone].

## 2017-04-17 NOTE — PATIENT INSTRUCTIONS
Take 6 1/2 mg of coumadin daily so one  5 mg and 1 and 1/2 of the the 1 mg   If you start to have more blood in your stool return sooner.

## 2017-05-02 ENCOUNTER — OFFICE VISIT (OUTPATIENT)
Dept: INTERNAL MEDICINE CLINIC | Age: 52
End: 2017-05-02

## 2017-05-02 VITALS
OXYGEN SATURATION: 97 % | HEIGHT: 66 IN | SYSTOLIC BLOOD PRESSURE: 126 MMHG | TEMPERATURE: 98 F | RESPIRATION RATE: 16 BRPM | BODY MASS INDEX: 26.05 KG/M2 | WEIGHT: 162.1 LBS | HEART RATE: 86 BPM | DIASTOLIC BLOOD PRESSURE: 86 MMHG

## 2017-05-02 DIAGNOSIS — Z79.01 ANTICOAGULATED ON COUMADIN: ICD-10-CM

## 2017-05-02 DIAGNOSIS — Z79.01 SUBTHERAPEUTIC ANTICOAGULATION: ICD-10-CM

## 2017-05-02 DIAGNOSIS — I82.401 DEEP VEIN THROMBOSIS (DVT) OF RIGHT LOWER EXTREMITY, UNSPECIFIED CHRONICITY, UNSPECIFIED VEIN (HCC): Primary | ICD-10-CM

## 2017-05-02 DIAGNOSIS — Z51.81 SUBTHERAPEUTIC ANTICOAGULATION: ICD-10-CM

## 2017-05-02 LAB
INR BLD: NORMAL
PT POC: 1.8 SEC
VALID INTERNAL CONTROL?: YES

## 2017-05-02 NOTE — MR AVS SNAPSHOT
Visit Information Date & Time Provider Department Dept. Phone Encounter #  
 5/2/2017  9:15  [facility] [place], [place] [phone] [id number] Follow-up Instructions Return in about 3 days (around 5/5/2017) for 10 am  ok to double book per Dr Tho Robertson 15 min recheck pt/INR. Upcoming Health Maintenance Date Due FOBT Q 1 YEAR AGE 50-75 11/8/2015 INFLUENZA AGE 9 TO ADULT 8/1/2017 DTaP/Tdap/Td series (2 - Td) 4/15/2025 Allergies as of 5/2/2017  Review Complete On: 5/2/2017 By: Luis M Ling No Known Allergies Current Immunizations  Reviewed on 4/15/2015 Name Date Influenza Vaccine 11/25/2013 Influenza Vaccine Intradermal PF 11/2/2015, 10/6/2014 Tdap 4/15/2015, 6/9/2013 Not reviewed this visit You Were Diagnosed With   
  
 Codes Comments Deep vein thrombosis (DVT) of right lower extremity, unspecified chronicity, unspecified vein (HCC)    -  Primary ICD-10-CM: I82.401 ICD-9-CM: 453.40 Anticoagulated on Coumadin     ICD-10-CM: Z51.81, Z79.01 
ICD-9-CM: V58.83, V58.61 Subtherapeutic anticoagulation     ICD-10-CM: Z51.81, Z79.01 
ICD-9-CM: V58.83, V58.61 Vitals BP Pulse Temp Resp Height(growth percentile) Weight(growth percentile) 126/86 (BP 1 Location: Left arm, BP Patient Position: Sitting) 86 98 °F (36.7 °C) 16 5' 6\" (1.676 m) 162 lb 1.6 oz (73.5 kg) SpO2 BMI Smoking Status 97% 26.16 kg/m2 Never Smoker Vitals History BMI and BSA Data Body Mass Index Body Surface Area  
 26.16 kg/m 2 1.85 m 2 Preferred Pharmacy [facility] [address] [place] [address] [phone] Your Updated Medication List  
  
   
This list is accurate as of: 5/2/17 10:27 AM.  Always use your most recent med list.  
  
  
  
  
 * divalproex  mg tablet Commonly known as:  DEPAKOTE  
1 po QHS * divalproex  mg tablet Commonly known as:  DEPAKOTE  
1 in am and 1 in the pm  
  
 levETIRAcetam 1,000 mg tablet 1000 mg in the am and 500mg in the pm  
  
 lisinopril 10 mg tablet Commonly known as:  Kimberly Shawl Take 1 Tab by mouth daily. oxyCODONE-acetaminophen 5-325 mg per tablet Commonly known as:  PERCOCET Take 1 Tab by mouth two (2) times daily as needed for Pain. Max Daily Amount: 2 Tabs. pramoxine-hydrocortisone 2.5-1 % topical cream  
Commonly known as:  PROTOFOAM-HC Apply  to affected area three (3) times daily. tadalafil 5 mg tablet Commonly known as:  CIALIS Take 1 Tab by mouth as needed. * warfarin 1 mg tablet Commonly known as:  COUMADIN As directed * warfarin 5 mg tablet Commonly known as:  COUMADIN Use 5 mg  
  
 zonisamide 100 mg capsule Commonly known as:  ZONEGRAN  
TAKE 2 CAPSULES BY MOUTH TWICE A DAY * Notice: This list has 4 medication(s) that are the same as other medications prescribed for you. Read the directions carefully, and ask your doctor or other care provider to review them with you. Follow-up Instructions Return in about 3 days (around 5/5/2017) for 10 am  ok to double book per Dr Mary Ann Berrios 15 min recheck pt/INR. Patient Instructions Increase to 6.5mg daily Introducing [facility] & HEALTH SERVICES! Ricky Bhagat introduces [employer] patient portal. Now you can access parts of your medical record, email your doctor's office, and request medication refills online. 1. In your internet browser, go to [email or web address] 2. Click on the First Time User? Click Here link in the Sign In box. You will see the New Member Sign Up page. 3. Enter your [employer] Access Code exactly as it appears below. You will not need to use this code after youve completed the sign-up process. If you do not sign up before the expiration date, you must request a new code. · [employer] Access Code: [email or web address] Expires: 5/22/2017  2:09 PM 
 
4. Enter the last four digits of your Social Security Number (xxxx) and Date of Birth (mm/dd/yyyy) as indicated and click Submit. You will be taken to the next sign-up page. 5. Create a [employer] ID. This will be your [employer] login ID and cannot be changed, so think of one that is secure and easy to remember. 6. Create a [employer] password. You can change your password at any time. 7. Enter your Password Reset Question and Answer. This can be used at a later time if you forget your password. 8. Enter your e-mail address. You will receive e-mail notification when new information is available in [address] 9. Click Sign Up. You can now view and download portions of your medical record. 10. Click the Download Summary menu link to download a portable copy of your medical information. If you have questions, please visit the Frequently Asked Questions section of the [employer] website. Remember, [employer] is NOT to be used for urgent needs. For medical emergencies, dial 911. Now available from your iPhone and Android! Please provide this summary of care documentation to your next provider. Your primary care clinician is listed as Luisa Rodriguez. If you have any questions after today's visit, please call [phone].

## 2017-05-02 NOTE — PROGRESS NOTES
Chief Complaint   Patient presents with    Follow Up Chronic Condition     1. Have you been to the ER, urgent care clinic since your last visit? Hospitalized since your last visit? No    2. Have you seen or consulted any other health care providers outside of the [address] [place] [address] since your last visit? Include any pap smears or colon screening.  No

## 2017-05-02 NOTE — PROGRESS NOTES
C/c PT/INR  Subjective:   Emily Belle 46 y.o.  male with a  past medical history reviewed see below. presents today for Anticoagulation monitoring. Indication: DVT and PE  INR Goal: 2.0-3.0. Current dose:  Coumadin 6 mg daily. Missed Coumadin Doses:  None  Medication Changes:  no  Dietary Changes:  no    Symptoms: taking coumadin appropriately without any bleeding. Latest INRs:  Lab Results   Component Value Date/Time    INR 2.0 02/08/2014 02:15 PM    INR 1.1 07/04/2013 02:30 PM    INR POC 2.8 04/17/2017 10:12 AM    INR POC 1.9 04/13/2017 03:22 PM    INR POC 1.8 02/21/2017 12:57 PM    Prothrombin time 19.5 02/08/2014 02:15 PM    Prothrombin time 11.3 07/04/2013 02:30 PM        ROS:. All other systems reviewed and are negative      Current Outpatient Prescriptions   Medication Sig Dispense Refill    divalproex DR (DEPAKOTE) 250 mg tablet 1 po QHS 30 Tab 4    warfarin (COUMADIN) 1 mg tablet As directed 100 Tab 11    warfarin (COUMADIN) 5 mg tablet Use 5 mg 60 Tab 11    lisinopril (PRINIVIL, ZESTRIL) 10 mg tablet Take 1 Tab by mouth daily. 30 Tab 4    zonisamide (ZONEGRAN) 100 mg capsule TAKE 2 CAPSULES BY MOUTH TWICE A DAY 60 Cap 4    levETIRAcetam 1,000 mg tablet 1000 mg in the am and 500mg in the pm 90 Tab 4    divalproex DR (DEPAKOTE) 500 mg tablet 1 in am and 1 in the pm 60 Tab 4    tadalafil (CIALIS) 5 mg tablet Take 1 Tab by mouth as needed. 30 Tab 0    oxyCODONE-acetaminophen (PERCOCET) 5-325 mg per tablet Take 1 Tab by mouth two (2) times daily as needed for Pain. Max Daily Amount: 2 Tabs. 60 Tab 0    pramoxine-hydrocortisone (PROTOFOAM-HC) 2.5-1 % topical cream Apply  to affected area three (3) times daily.  10 g 0     No Known Allergies  Past Medical History:   Diagnosis Date    DVT (deep venous thrombosis) ([facility] 7/2/2013    Presence of IVC filter     Pulmonary embolism ([facility] 7/4/2013    Seizures (HCC)     monthly seizures- sometimes several per month- managed by PCP at this time, per caregiver    Trauma     hit in head wiht board playing [employer]      Past Surgical History:   Procedure Laterality Date    HX CRANIOTOMY  2006    Trauma repair   [facility] VASCULAR SURGERY PROCEDURE UNLIST  2013    IVC filter     Family History   Problem Relation Age of Onset    Alcohol abuse Father 21      of hepatic cirrhosis in his 45s. Was drinking 4-5 bottles of wine daily.  Hypertension Mother 61    COPD Mother     Sleep Apnea Mother      Social History   Substance Use Topics    Smoking status: Never Smoker    Smokeless tobacco: Never Used    Alcohol use 5.0 oz/week     10 Cans of beer per week          Objective:     Visit Vitals    /86 (BP 1 Location: Left arm, BP Patient Position: Sitting)    Pulse 86    Temp 98 °F (36.7 °C)    Resp 16    Ht 5' 6\" (1.676 m)    Wt 162 lb 1.6 oz (73.5 kg)    SpO2 97%    BMI 26.16 kg/m2     Gen: NAD, pleasant  Cardio: RRR nl S1 S2 no  murmur  Lungs CTAB no wheeze no rales no rhonchi  Skin:no bruising noted no calf tenderness     Assessment/Plan:   Mary Gardner was seen today for follow up chronic condition. Diagnoses and all orders for this visit:    Deep vein thrombosis (DVT) of right lower extremity, unspecified chronicity, unspecified vein (HCC)  -     Cancel: AMB POC PT/INR    Anticoagulated on Coumadin    Subtherapeutic anticoagulation      Follow-up Disposition:  Return in about 3 days (around 2017) for 10 am  ok to double book per Dr Mary Ann Berrios 15 min recheck pt/INR. Kassi Snooks Increase to  6.5 and recheck in 4 days The patient voiced understanding of the above. Medication side effects were reviewed with the patient. Call with any concerns.

## 2017-05-12 ENCOUNTER — OFFICE VISIT (OUTPATIENT)
Dept: INTERNAL MEDICINE CLINIC | Age: 52
End: 2017-05-12

## 2017-05-12 VITALS
WEIGHT: 166.2 LBS | TEMPERATURE: 96.7 F | RESPIRATION RATE: 17 BRPM | BODY MASS INDEX: 26.71 KG/M2 | SYSTOLIC BLOOD PRESSURE: 122 MMHG | OXYGEN SATURATION: 98 % | HEART RATE: 91 BPM | DIASTOLIC BLOOD PRESSURE: 87 MMHG | HEIGHT: 66 IN

## 2017-05-12 DIAGNOSIS — Z91.14 NONCOMPLIANCE WITH MEDICATION REGIMEN: ICD-10-CM

## 2017-05-12 DIAGNOSIS — I82.401 DEEP VEIN THROMBOSIS (DVT) OF RIGHT LOWER EXTREMITY, UNSPECIFIED CHRONICITY, UNSPECIFIED VEIN (HCC): ICD-10-CM

## 2017-05-12 DIAGNOSIS — Z79.01 ANTICOAGULANT LONG-TERM USE: ICD-10-CM

## 2017-05-12 DIAGNOSIS — R79.1 SUBTHERAPEUTIC INTERNATIONAL NORMALIZED RATIO (INR): Primary | ICD-10-CM

## 2017-05-12 LAB
INR BLD: NORMAL
PT POC: 1.4 SEC
VALID INTERNAL CONTROL?: YES

## 2017-05-12 NOTE — PROGRESS NOTES
Chief Complaint   Patient presents with    Coagulation disorder     1. Have you been to the ER, urgent care clinic since your last visit? Hospitalized since your last visit? No    2. Have you seen or consulted any other health care providers outside of the [address] [place] [address] since your last visit? Include any pap smears or colon screening.  No

## 2017-05-12 NOTE — PROGRESS NOTES
C/c PT/INR  Subjective:   Patito Malone 46 y.o.  male with a  past medical history reviewed see below. presents today for Anticoagulation monitoring. Indication: DVT and PE  INR Goal: 2.0-3.0. Current dose:  Coumadin 5/6 mg daily. Missed Coumadin Doses:  Missed two doses of medication and he has not been taking it at ngith time   Medication Changes:  no  Dietary Changes:  no    Symptoms: taking coumadin appropriately without any bleeding. Latest INRs:  Lab Results   Component Value Date/Time    INR 2.0 02/08/2014 02:15 PM    INR 1.1 07/04/2013 02:30 PM    INR POC 2.8 04/17/2017 10:12 AM    INR POC 1.9 04/13/2017 03:22 PM    INR POC 1.8 02/21/2017 12:57 PM    Prothrombin time 19.5 02/08/2014 02:15 PM    Prothrombin time 11.3 07/04/2013 02:30 PM        ROS:. All other systems reviewed and are negative      Current Outpatient Prescriptions   Medication Sig Dispense Refill    divalproex DR (DEPAKOTE) 250 mg tablet 1 po QHS 30 Tab 4    warfarin (COUMADIN) 1 mg tablet As directed 100 Tab 11    warfarin (COUMADIN) 5 mg tablet Use 5 mg 60 Tab 11    lisinopril (PRINIVIL, ZESTRIL) 10 mg tablet Take 1 Tab by mouth daily. 30 Tab 4    zonisamide (ZONEGRAN) 100 mg capsule TAKE 2 CAPSULES BY MOUTH TWICE A DAY 60 Cap 4    divalproex DR (DEPAKOTE) 500 mg tablet 1 in am and 1 in the pm 60 Tab 4    tadalafil (CIALIS) 5 mg tablet Take 1 Tab by mouth as needed. 30 Tab 0    pramoxine-hydrocortisone (PROTOFOAM-HC) 2.5-1 % topical cream Apply  to affected area three (3) times daily. 10 g 0    levETIRAcetam 1,000 mg tablet TAKE ONE TAB BY MOUTH EVERY MORNING AND A 1/2 TAB EVERY EVENING 90 Tab 3    oxyCODONE-acetaminophen (PERCOCET) 5-325 mg per tablet Take 1 Tab by mouth two (2) times daily as needed for Pain. Max Daily Amount: 2 Tabs.  61 Tab 0     No Known Allergies  Past Medical History:   Diagnosis Date    DVT (deep venous thrombosis) ([facility] 7/2/2013    Presence of IVC filter     Pulmonary embolism ([address] 7/4/2013  Seizures ([address]     monthly seizures- sometimes several per month- managed by PCP at this time, per caregiver    Trauma     hit in head wiht board playing domin[employer]      Past Surgical History:   Procedure Laterality Date    HX CRANIOTOMY  2006    Trauma repair    VASCULAR SURGERY PROCEDURE UNLIST  2013    IVC filter     Family History   Problem Relation Age of Onset    Alcohol abuse Father 21      of hepatic cirrhosis in his 45s. Was drinking 4-5 bottles of wine daily.  Hypertension Mother 61    COPD Mother     Sleep Apnea Mother      Social History   Substance Use Topics    Smoking status: Never Smoker    Smokeless tobacco: Never Used    Alcohol use 5.0 oz/week     10 Cans of beer per week          Objective:     Visit Vitals    /87 (BP 1 Location: Left arm, BP Patient Position: Sitting)    Pulse 91    Temp 96.7 °F (35.9 °C)    Resp 17    Ht 5' 6\" (1.676 m)    Wt 166 lb 3.2 oz (75.4 kg)    SpO2 98%    BMI 26.83 kg/m2     Gen: NAD, pleasant  Cardio: RRR nl S1 S2 no  murmur  Lungs CTAB no wheeze no rales no rhonchi  Skin:no bruising noted no calf tenderness     Assessment/Plan:   Feliberto Alvarado was seen today for coagulation disorder. Diagnoses and all orders for this visit:    Subtherapeutic international normalized ratio (INR)    Noncompliance with medication regimen    Deep vein thrombosis (DVT) of right lower extremity, unspecified chronicity, unspecified vein (HCC)  -     AMB POC PT/INR    Anticoagulant long-term use      Follow-up Disposition:  Return for monday am pt/inr ok to double book pt . .pt insturctions  Again take your medication at night time and restart at 6mg today and then 5 mg tomorrow and continue to alternate   The patient voiced understanding of the above. Medication side effects were reviewed with the patient. Call with any concerns.

## 2017-05-12 NOTE — PATIENT INSTRUCTIONS
Again take your medication at night time and restart at 6mg today and then 5 mg tomorrow and continue to alternate

## 2017-05-12 NOTE — MR AVS SNAPSHOT
Visit Information Date & Time Provider Department Dept. Phone Encounter #  
 5/12/2017 10:00 AM Analisa Gold, [address]  [place] [id number] Follow-up Instructions Return for monday am pt/inr ok to double book pt . Upcoming Health Maintenance Date Due FOBT Q 1 YEAR AGE 50-75 11/8/2015 INFLUENZA AGE 9 TO ADULT 8/1/2017 DTaP/Tdap/Td series (2 - Td) 4/15/2025 Allergies as of 5/12/2017  Review Complete On: 5/12/2017 By: Madeleine Patrick No Known Allergies Current Immunizations  Reviewed on 4/15/2015 Name Date Influenza Vaccine 11/25/2013 Influenza Vaccine Intradermal PF 11/2/2015, 10/6/2014 Tdap 4/15/2015, 6/9/2013 Not reviewed this visit You Were Diagnosed With   
  
 Codes Comments Subtherapeutic international normalized ratio (INR)    -  Primary ICD-10-CM: R79.1 ICD-9-CM: 790.92 Noncompliance with medication regimen     ICD-10-CM: Z91.14 
ICD-9-CM: V15.81 Deep vein thrombosis (DVT) of right lower extremity, unspecified chronicity, unspecified vein (HCC)     ICD-10-CM: I82.401 ICD-9-CM: 453.40 Anticoagulant long-term use     ICD-10-CM: Z79.01 
ICD-9-CM: V58.61 Vitals BP Pulse Temp Resp Height(growth percentile) Weight(growth percentile) 122/87 (BP 1 Location: Left arm, BP Patient Position: Sitting) 91 96.7 °F (35.9 °C) 17 5' 6\" (1.676 m) 166 lb 3.2 oz (75.4 kg) SpO2 BMI Smoking Status 98% 26.83 kg/m2 Never Smoker Vitals History BMI and BSA Data Body Mass Index Body Surface Area  
 26.83 kg/m 2 1.87 m 2 Preferred Pharmacy [facility] Phone [facility] [address] [place] [address] [phone] Your Updated Medication List  
  
   
This list is accurate as of: 5/12/17 10:52 AM.  Always use your most recent med list.  
  
  
  
  
 * divalproex  mg tablet Commonly known as:  DEPAKOTE  
1 po QHS * divalproex  mg tablet Commonly known as:  DEPAKOTE  
1 in am and 1 in the pm  
  
 levETIRAcetam 1,000 mg tablet 1000 mg in the am and 500mg in the pm  
  
 lisinopril 10 mg tablet Commonly known as:  Dorean Peeks Take 1 Tab by mouth daily. oxyCODONE-acetaminophen 5-325 mg per tablet Commonly known as:  PERCOCET Take 1 Tab by mouth two (2) times daily as needed for Pain. Max Daily Amount: 2 Tabs. pramoxine-hydrocortisone 2.5-1 % topical cream  
Commonly known as:  PROTOFOAM-HC Apply  to affected area three (3) times daily. tadalafil 5 mg tablet Commonly known as:  CIALIS Take 1 Tab by mouth as needed. * warfarin 1 mg tablet Commonly known as:  COUMADIN As directed * warfarin 5 mg tablet Commonly known as:  COUMADIN Use 5 mg  
  
 zonisamide 100 mg capsule Commonly known as:  ZONEGRAN  
TAKE 2 CAPSULES BY MOUTH TWICE A DAY * Notice: This list has 4 medication(s) that are the same as other medications prescribed for you. Read the directions carefully, and ask your doctor or other care provider to review them with you. We Performed the Following AMB POC PT/INR [07133 CPT(R)] Follow-up Instructions Return for monday am pt/inr ok to double book pt . Patient Instructions Again take your medication at night time and restart at 6mg today and then 5 mg tomorrow and continue to alternate Introducing [facility] SERVICES! Zena Mcneil introduces [employer] patient portal. Now you can access parts of your medical record, email your doctor's office, and request medication refills online. 1. In your internet browser, go to [email or web address] 2. Click on the First Time User? Click Here link in the Sign In box. You will see the New Member Sign Up page. 3. Enter your [employer] Access Code exactly as it appears below.  You will not need to use this code after youve completed the sign-up process. If you do not sign up before the expiration date, you must request a new code. · [employer] Access Code: [email or web address] Expires: 5/22/2017  2:09 PM 
 
4. Enter the last four digits of your Social Security Number (xxxx) and Date of Birth (mm/dd/yyyy) as indicated and click Submit. You will be taken to the next sign-up page. 5. Create a [employer] ID. This will be your [employer] login ID and cannot be changed, so think of one that is secure and easy to remember. 6. Create a [employer] password. You can change your password at any time. 7. Enter your Password Reset Question and Answer. This can be used at a later time if you forget your password. 8. Enter your e-mail address. You will receive e-mail notification when new information is available in [address] 9. Click Sign Up. You can now view and download portions of your medical record. 10. Click the Download Summary menu link to download a portable copy of your medical information. If you have questions, please visit the Frequently Asked Questions section of the [employer] website. Remember, [employer] is NOT to be used for urgent needs. For medical emergencies, dial 911. Now available from your iPhone and Android! Please provide this summary of care documentation to your next provider. Your primary care clinician is listed as Reed Obrien. If you have any questions after today's visit, please call [phone].

## 2017-05-15 ENCOUNTER — OFFICE VISIT (OUTPATIENT)
Dept: INTERNAL MEDICINE CLINIC | Age: 52
End: 2017-05-15

## 2017-05-15 VITALS
RESPIRATION RATE: 16 BRPM | TEMPERATURE: 98.4 F | DIASTOLIC BLOOD PRESSURE: 83 MMHG | WEIGHT: 166 LBS | HEIGHT: 66 IN | SYSTOLIC BLOOD PRESSURE: 141 MMHG | OXYGEN SATURATION: 98 % | BODY MASS INDEX: 26.68 KG/M2 | HEART RATE: 61 BPM

## 2017-05-15 DIAGNOSIS — Z51.81 ANTICOAGULATION MANAGEMENT ENCOUNTER: Primary | ICD-10-CM

## 2017-05-15 DIAGNOSIS — Z79.01 ANTICOAGULATION MANAGEMENT ENCOUNTER: Primary | ICD-10-CM

## 2017-05-15 LAB
INR BLD: NORMAL
PT POC: 2.1 SEC
VALID INTERNAL CONTROL?: YES

## 2017-05-15 NOTE — PROGRESS NOTES
Subjective:   Gala العلي 46 y.o.  male with a  past medical history reviewed see below. Here for anticoagulation check. alternate 7/6 mg denies leg pain no missed doses no change in diet no sob no bleeding        ROS: otherwise feeling generally well. All other systems reviewed and are negative      Current Outpatient Prescriptions   Medication Sig Dispense Refill    divalproex DR (DEPAKOTE) 250 mg tablet 1 po QHS 30 Tab 4    warfarin (COUMADIN) 1 mg tablet As directed 100 Tab 11    warfarin (COUMADIN) 5 mg tablet Use 5 mg 60 Tab 11    lisinopril (PRINIVIL, ZESTRIL) 10 mg tablet Take 1 Tab by mouth daily. 30 Tab 4    zonisamide (ZONEGRAN) 100 mg capsule TAKE 2 CAPSULES BY MOUTH TWICE A DAY 60 Cap 4    levETIRAcetam 1,000 mg tablet 1000 mg in the am and 500mg in the pm 90 Tab 4    divalproex DR (DEPAKOTE) 500 mg tablet 1 in am and 1 in the pm 60 Tab 4    tadalafil (CIALIS) 5 mg tablet Take 1 Tab by mouth as needed. 30 Tab 0    oxyCODONE-acetaminophen (PERCOCET) 5-325 mg per tablet Take 1 Tab by mouth two (2) times daily as needed for Pain. Max Daily Amount: 2 Tabs. 60 Tab 0    pramoxine-hydrocortisone (PROTOFOAM-HC) 2.5-1 % topical cream Apply  to affected area three (3) times daily. 10 g 0     No Known Allergies  Past Medical History:   Diagnosis Date    DVT (deep venous thrombosis) ([facility] [address] 2013    Presence of IVC filter     Pulmonary embolism (HCC) 2013    Seizures (HCC)     monthly seizures- sometimes several per month- managed by PCP at this time, per caregiver    Trauma     hit in head wiht board playing [employer]      Past Surgical History:   Procedure Laterality Date    HX CRANIOTOMY  2006    Trauma repair    VASCULAR SURGERY PROCEDURE UNLIST      IVC filter     Family History   Problem Relation Age of Onset    Alcohol abuse Father 21      of hepatic cirrhosis in his 45s. Was drinking 4-5 bottles of wine daily.     Hypertension Mother 61    COPD Mother     Sleep Apnea Mother      Social History   Substance Use Topics    Smoking status: Never Smoker    Smokeless tobacco: Never Used    Alcohol use 5.0 oz/week     10 Cans of beer per week          Objective: There were no vitals taken for this visit. Gen: NAD, pleasant  HEENT: normal appearing head, nares patent, PERRLA, EOMI, oropharynx no erythema, no cervical lymphadenopathy neck supple   Cardio: RRR nl S1S2 no murmur  Lungs CTAB no wheeze no rales no rhonchi  ABD Soft non tender non distended + bowel sounds  Extremities: full ROM X 4 no clubbing no cyanosis  Neuro: no gross focal deficits noted, alert and orientated X 3  Psych.: well groomed no outward signs of depression. Assessment/Plan:   Vishnu Javed was seen today for coagulation disorder. Diagnoses and all orders for this visit:    Anticoagulation management encounter  -     [facility] POC PT/INR      Follow-up Disposition:  Return in about 3 weeks (around 6/5/2017) for recheck PT/INR  15 .  avs printed and given to the pt. The patient voiced understanding of the above. Medication side effects were reviewed with the patient. Call with any concerns.

## 2017-05-15 NOTE — PROGRESS NOTES
Chief Complaint   Patient presents with    Coagulation disorder     PT/INR completed  Pt tolerated well

## 2017-05-15 NOTE — MR AVS SNAPSHOT
Visit Information Date & Time Provider Department Dept. Phone Encounter #  
 5/15/2017  9:45  [facility] [address] [facility] [place] [id number] Follow-up Instructions Return in about 3 weeks (around 6/5/2017) for recheck PT/INR  15 . Upcoming Health Maintenance Date Due FOBT Q 1 YEAR AGE 50-75 11/8/2015 INFLUENZA AGE 9 TO ADULT 8/1/2017 DTaP/Tdap/Td series (2 - Td) 4/15/2025 Allergies as of 5/15/2017  Review Complete On: 5/12/2017 By: Alannah Zamudio No Known Allergies Current Immunizations  Reviewed on 4/15/2015 Name Date Influenza Vaccine 11/25/2013 Influenza Vaccine Intradermal PF 11/2/2015, 10/6/2014 Tdap 4/15/2015, 6/9/2013 Not reviewed this visit You Were Diagnosed With   
  
 Codes Comments Anticoagulation management encounter    -  Primary ICD-10-CM: Z51.81, Z79.01 
ICD-9-CM: V58.83, V58.61 Vitals Pulse Temp Resp Height(growth percentile) Weight(growth percentile) SpO2  
 61 98.4 °F (36.9 °C) 16 5' 6\" (1.676 m) 166 lb (75.3 kg) 98% BMI Smoking Status 26.79 kg/m2 Never Smoker BMI and BSA Data Body Mass Index Body Surface Area  
 26.79 kg/m 2 1.87 m 2 Preferred Pharmacy [facility] [address] [place] [address] [phone] Your Updated Medication List  
  
   
This list is accurate as of: 5/15/17 10:39 AM.  Always use your most recent med list.  
  
  
  
  
 * divalproex  mg tablet Commonly known as:  DEPAKOTE  
1 po QHS * divalproex  mg tablet Commonly known as:  DEPAKOTE  
1 in am and 1 in the pm  
  
 levETIRAcetam 1,000 mg tablet 1000 mg in the am and 500mg in the pm  
  
 lisinopril 10 mg tablet Commonly known as:  Belen Gravel Take 1 Tab by mouth daily. oxyCODONE-acetaminophen 5-325 mg per tablet Commonly known as:  PERCOCET  
 Take 1 Tab by mouth two (2) times daily as needed for Pain. Max Daily Amount: 2 Tabs. pramoxine-hydrocortisone 2.5-1 % topical cream  
Commonly known as:  PROTOFOAM-HC Apply  to affected area three (3) times daily. tadalafil 5 mg tablet Commonly known as:  CIALIS Take 1 Tab by mouth as needed. * warfarin 1 mg tablet Commonly known as:  COUMADIN As directed * warfarin 5 mg tablet Commonly known as:  COUMADIN Use 5 mg  
  
 zonisamide 100 mg capsule Commonly known as:  ZONEGRAN  
TAKE 2 CAPSULES BY MOUTH TWICE A DAY * Notice: This list has 4 medication(s) that are the same as other medications prescribed for you. Read the directions carefully, and ask your doctor or other care provider to review them with you. We Performed the Following AMB POC PT/INR [60422 CPT(R)] Follow-up Instructions Return in about 3 weeks (around 6/5/2017) for recheck PT/INR  15 . Introducing [facility]! [facility] introduces [employer] patient portal. Now you can access parts of your medical record, email your doctor's office, and request medication refills online. 1. In your internet browser, go to [email or web address] 2. Click on the First Time User? Click Here link in the Sign In box. You will see the New Member Sign Up page. 3. Enter your [employer] Access Code exactly as it appears below. You will not need to use this code after youve completed the sign-up process. If you do not sign up before the expiration date, you must request a new code. · [employer] Access Code: [email or web address] Expires: 5/22/2017  2:09 PM 
 
4. Enter the last four digits of your Social Security Number (xxxx) and Date of Birth (mm/dd/yyyy) as indicated and click Submit. You will be taken to the next sign-up page. 5. Create a Hitpostt ID. This will be your [employer] login ID and cannot be changed, so think of one that is secure and easy to remember. 6. Create a CellScape password. You can change your password at any time. 7. Enter your Password Reset Question and Answer. This can be used at a later time if you forget your password. 8. Enter your e-mail address. You will receive e-mail notification when new information is available in [address] 9. Click Sign Up. You can now view and download portions of your medical record. 10. Click the Download Summary menu link to download a portable copy of your medical information. If you have questions, please visit the Frequently Asked Questions section of the [employer] website. Remember, [employer] is NOT to be used for urgent needs. For medical emergencies, dial 911. Now available from your iPhone and Android! Please provide this summary of care documentation to your next provider. Your primary care clinician is listed as Jerry Sotomayor. If you have any questions after today's visit, please call [phone].

## 2017-05-17 DIAGNOSIS — R56.9 CONVULSIONS, UNSPECIFIED CONVULSION TYPE (HCC): ICD-10-CM

## 2017-05-31 RX ORDER — LEVETIRACETAM 1000 MG/1
TABLET ORAL
Qty: 90 TAB | Refills: 3 | Status: SHIPPED | OUTPATIENT
Start: 2017-05-31 | End: 2018-06-13 | Stop reason: SDUPTHER

## 2017-06-14 ENCOUNTER — PATIENT OUTREACH (OUTPATIENT)
Dept: INTERNAL MEDICINE CLINIC | Age: 52
End: 2017-06-14

## 2017-06-14 NOTE — PROGRESS NOTES
Case management  Navigator contacted the patient by telephone to inquire about missed appointment on [id number]. [facility] record has been reviewed. Spoke with the patient verified his HIPAA by name and . I inquired why the patient missed his last PCP appointment. He replied that he was not aware of a scheduling conflict with his caregiver's doctor's appointment until the last minute. He appreciates the Navigator contact and desires to schedule an appointment with Kendal Linares MD. The next appointment is scheduled for [address] [place] [address] @5655. I instructed the patient to have his INR checked this week. He agrees to complete a nurse visit AT [facility] for an INR check on . I reminded the patient to contact his pharmacy to  the recently approved Keppra medication. He agreed to do so expressed thanks and ended the call.

## 2017-06-16 ENCOUNTER — CLINICAL SUPPORT (OUTPATIENT)
Dept: INTERNAL MEDICINE CLINIC | Age: 52
End: 2017-06-16

## 2017-06-16 VITALS
HEIGHT: 66 IN | WEIGHT: 160.3 LBS | OXYGEN SATURATION: 98 % | HEART RATE: 92 BPM | TEMPERATURE: 98.4 F | BODY MASS INDEX: 25.76 KG/M2 | SYSTOLIC BLOOD PRESSURE: 112 MMHG | DIASTOLIC BLOOD PRESSURE: 76 MMHG | RESPIRATION RATE: 16 BRPM

## 2017-06-16 DIAGNOSIS — I82.401 DEEP VEIN THROMBOSIS (DVT) OF RIGHT LOWER EXTREMITY, UNSPECIFIED CHRONICITY, UNSPECIFIED VEIN (HCC): Primary | ICD-10-CM

## 2017-06-16 DIAGNOSIS — R56.9 CONVULSIONS, UNSPECIFIED CONVULSION TYPE (HCC): ICD-10-CM

## 2017-06-16 LAB
INR BLD: 1.9
PT POC: NORMAL SECONDS
VALID INTERNAL CONTROL?: YES

## 2017-06-19 ENCOUNTER — OFFICE VISIT (OUTPATIENT)
Dept: INTERNAL MEDICINE CLINIC | Age: 52
End: 2017-06-19

## 2017-06-19 VITALS
TEMPERATURE: 97.4 F | HEART RATE: 95 BPM | OXYGEN SATURATION: 99 % | WEIGHT: 160 LBS | DIASTOLIC BLOOD PRESSURE: 61 MMHG | RESPIRATION RATE: 16 BRPM | BODY MASS INDEX: 25.71 KG/M2 | SYSTOLIC BLOOD PRESSURE: 104 MMHG | HEIGHT: 66 IN

## 2017-06-19 DIAGNOSIS — R56.9 SEIZURES (HCC): ICD-10-CM

## 2017-06-19 DIAGNOSIS — Z79.01 ANTICOAGULATION MANAGEMENT ENCOUNTER: Primary | ICD-10-CM

## 2017-06-19 DIAGNOSIS — Z71.89 ADVANCE DIRECTIVE DISCUSSED WITH PATIENT: ICD-10-CM

## 2017-06-19 DIAGNOSIS — Z51.81 ANTICOAGULATION MANAGEMENT ENCOUNTER: Primary | ICD-10-CM

## 2017-06-19 DIAGNOSIS — I10 ESSENTIAL HYPERTENSION: ICD-10-CM

## 2017-06-19 LAB
INR BLD: 2.5
PT POC: NORMAL SECONDS
VALID INTERNAL CONTROL?: YES

## 2017-06-19 NOTE — PROGRESS NOTES
Chief Complaint   Patient presents with    Coagulation disorder    Medication Evaluation           Subjective:   Gabriel Player 46 y.o.  male with a  past medical history reviewed see below. comes in today c/o:   He is here for anticoagulation management states he is now taking it in the eveneing and using 6 mg no missed doses no bleeding or bruising no blood in stool and he has started walk again using all other medication as directed he has cut back na he had a small sz last week,. He has not been to his neurologist     ROS: otherwise feeling generally well. All other systems reviewed and are negative      Current Outpatient Prescriptions   Medication Sig Dispense Refill    levETIRAcetam 1,000 mg tablet TAKE ONE TAB BY MOUTH EVERY MORNING AND A 1/2 TAB EVERY EVENING 90 Tab 3    divalproex DR (DEPAKOTE) 250 mg tablet 1 po QHS 30 Tab 4    warfarin (COUMADIN) 1 mg tablet As directed 100 Tab 11    warfarin (COUMADIN) 5 mg tablet Use 5 mg 60 Tab 11    lisinopril (PRINIVIL, ZESTRIL) 10 mg tablet Take 1 Tab by mouth daily. 30 Tab 4    zonisamide (ZONEGRAN) 100 mg capsule TAKE 2 CAPSULES BY MOUTH TWICE A DAY 60 Cap 4    divalproex DR (DEPAKOTE) 500 mg tablet 1 in am and 1 in the pm 60 Tab 4    tadalafil (CIALIS) 5 mg tablet Take 1 Tab by mouth as needed. 30 Tab 0    oxyCODONE-acetaminophen (PERCOCET) 5-325 mg per tablet Take 1 Tab by mouth two (2) times daily as needed for Pain. Max Daily Amount: 2 Tabs. 60 Tab 0    pramoxine-hydrocortisone (PROTOFOAM-HC) 2.5-1 % topical cream Apply  to affected area three (3) times daily.  10 g 0     No Known Allergies  Past Medical History:   Diagnosis Date    DVT (deep venous thrombosis) ([facility] [address] 7/2/2013    Presence of IVC filter     Pulmonary embolism (HCC) 7/4/2013    Seizures (HCC)     monthly seizures- sometimes several per month- managed by PCP at this time, per caregiver    Trauma     hit in head wiht board playing [employer]      Past Surgical History:   Procedure Laterality Date    HX CRANIOTOMY  2006    Trauma repair    VASCULAR SURGERY PROCEDURE UNLIST  2013    IVC filter     Family History   Problem Relation Age of Onset    Alcohol abuse Father 21      of hepatic cirrhosis in his 45s. Was drinking 4-5 bottles of wine daily.  Hypertension Mother 61    COPD Mother     Sleep Apnea Mother      Social History   Substance Use Topics    Smoking status: Never Smoker    Smokeless tobacco: Never Used    Alcohol use 5.0 oz/week     10 Cans of beer per week          Objective:     Visit Vitals    /61 (BP 1 Location: Left arm, BP Patient Position: Sitting)    Pulse 95    Temp 97.4 °F (36.3 °C) (Oral)    Resp 16    Ht 5' 6\" (1.676 m)    Wt 160 lb (72.6 kg)    SpO2 99%    BMI 25.82 kg/m2     Gen: NAD, pleasant  HEENT: normal appearing head, nares patent, PERRLA, EOMI, oropharynx no erythema, no cervical lymphadenopathy neck supple   Cardio: RRR nl S1S2 no murmur  Lungs CTAB no wheeze no rales no rhonchi  ABD Soft non tender non distended + bowel sounds  Extremities: full ROM X 4 no clubbing no cyanosis  Neuro: no gross focal deficits noted, alert and orientated X 3  Psych.: well groomed no outward signs of depression. Assessment/Plan:   Feliberto Alvarado was seen today for coagulation disorder and medication evaluation. Diagnoses and all orders for this visit:    Anticoagulation management encounter  -     AMB POC PT/INR    Essential hypertension    Seizures ([facility] [address]    Advance directive discussed with patient      Follow-up Disposition:  Return in about 4 weeks (around 2017) for recheck pt/inr 15 min . Paty fords printed and given to the pt. . ftpadvised Follow up w/ neurologist at [facility]! The patient voiced understanding of the above. Medication side effects were reviewed with the patient. Call with any concerns.

## 2017-06-19 NOTE — ACP (ADVANCE CARE PLANNING)
Advance car planning discussed used the yellow tool reviewed #1-3   Time 5 mines  minutes   hca will likely be Rivas Oneil ([phone]

## 2017-06-19 NOTE — PATIENT INSTRUCTIONS
You are again advised to follow up with your neurologist at [facility]!   Continue coumadin 6 mg at night

## 2017-06-19 NOTE — MR AVS SNAPSHOT
Visit Information Date & Time Provider Department Dept. Phone Encounter #  
 6/19/2017  9:00 AM Max Monahan 1404 [facility] [phone] [id number] Follow-up Instructions Return in about 4 weeks (around 7/17/2017) for recheck pt/inr 15 min . Your Appointments 6/30/2017  9:15 AM  
ROUTINE CARE with Max Monahan MD  
1404 [facility] ([facility]) Appt Note: INR recheck, FOBT is due  
 [address] [facility] [[email or web address]],[address] [facility] 7 [address]  
[phone]  
  
   
 [address] [place] [address],[address] [facility] 7 [address] Upcoming Health Maintenance Date Due FOBT Q 1 YEAR AGE 50-75 11/8/2015 INFLUENZA AGE 9 TO ADULT 8/1/2017 DTaP/Tdap/Td series (2 - Td) 4/15/2025 Allergies as of 6/19/2017  Review Complete On: 6/16/2017 By: Livier Young No Known Allergies Current Immunizations  Reviewed on 4/15/2015 Name Date Influenza Vaccine 11/25/2013 Influenza Vaccine Intradermal PF 11/2/2015, 10/6/2014 Tdap 4/15/2015, 6/9/2013 Not reviewed this visit You Were Diagnosed With   
  
 Codes Comments Anticoagulation management encounter    -  Primary ICD-10-CM: Z51.81, Z79.01 
ICD-9-CM: V58.83, V58.61 Essential hypertension     ICD-10-CM: I10 
ICD-9-CM: 401.9 Seizures ([facility] [address]     ICD-10-CM: R56.9 ICD-9-CM: 780.39 Advance directive discussed with patient     ICD-10-CM: Z71.89 ICD-9-CM: V65.49 Vitals BP Pulse Temp Resp Height(growth percentile) Weight(growth percentile) 104/61 (BP 1 Location: Left arm, BP Patient Position: Sitting) 95 97.4 °F (36.3 °C) (Oral) 16 5' 6\" (1.676 m) 160 lb (72.6 kg) SpO2 BMI Smoking Status 99% 25.82 kg/m2 Never Smoker BMI and BSA Data Body Mass Index Body Surface Area  
 25.82 kg/m 2 1.84 m 2 Preferred Pharmacy Pharmacy Name Phone [facility] [address] [place] [address] [address] [phone] Your Updated Medication List  
  
   
This list is accurate as of: 6/19/17  9:51 AM.  Always use your most recent med list.  
  
  
  
  
 * divalproex  mg tablet Commonly known as:  DEPAKOTE  
1 po QHS * divalproex  mg tablet Commonly known as:  DEPAKOTE  
1 in am and 1 in the pm  
  
 levETIRAcetam 1,000 mg tablet TAKE ONE TAB BY MOUTH EVERY MORNING AND A 1/2 TAB EVERY EVENING  
  
 lisinopril 10 mg tablet Commonly known as:  Booker Camel Take 1 Tab by mouth daily. oxyCODONE-acetaminophen 5-325 mg per tablet Commonly known as:  PERCOCET Take 1 Tab by mouth two (2) times daily as needed for Pain. Max Daily Amount: 2 Tabs. pramoxine-hydrocortisone 2.5-1 % topical cream  
Commonly known as:  PROTOFOAM-HC Apply  to affected area three (3) times daily. tadalafil 5 mg tablet Commonly known as:  CIALIS Take 1 Tab by mouth as needed. * warfarin 1 mg tablet Commonly known as:  COUMADIN As directed * warfarin 5 mg tablet Commonly known as:  COUMADIN Use 5 mg  
  
 zonisamide 100 mg capsule Commonly known as:  ZONEGRAN  
TAKE 2 CAPSULES BY MOUTH TWICE A DAY * Notice: This list has 4 medication(s) that are the same as other medications prescribed for you. Read the directions carefully, and ask your doctor or other care provider to review them with you. We Performed the Following AMB POC PT/INR [34109 CPT(R)] Follow-up Instructions Return in about 4 weeks (around 7/17/2017) for recheck pt/inr 15 min . Patient Instructions You are again advised to follow up with your neurologist at [facility]! Continue coumadin 6 mg at night Introducing [facility] HE[facility] SERVICES!    
 Kacey Reina introduces [employer] patient portal. Now you can access parts of your medical record, email your doctor's office, and request medication refills online. 1. In your internet browser, go to [email or web address] 2. Click on the First Time User? Click Here link in the Sign In box. You will see the New Member Sign Up page. 3. Enter your EcoGroomer Access Code exactly as it appears below. You will not need to use this code after youve completed the sign-up process. If you do not sign up before the expiration date, you must request a new code. · Eco[employer] Access Code: [email or web address] Expires: 9/14/2017  9:50 AM 
 
4. Enter the last four digits of your Social Security Number (xxxx) and Date of Birth (mm/dd/yyyy) as indicated and click Submit. You will be taken to the next sign-up page. 5. Create a EcoGroomer ID. This will be your EcoGroomer login ID and cannot be changed, so think of one that is secure and easy to remember. 6. Create a EcoGroomer password. You can change your password at any time. 7. Enter your Password Reset Question and Answer. This can be used at a later time if you forget your password. 8. Enter your e-mail address. You will receive e-mail notification when new information is available in [address] 9. Click Sign Up. You can now view and download portions of your medical record. 10. Click the Download Summary menu link to download a portable copy of your medical information. If you have questions, please visit the Frequently Asked Questions section of the [employer] website. Remember, Eco[employer]er is NOT to be used for urgent needs. For medical emergencies, dial 911. Now available from your iPhone and Android! Please provide this summary of care documentation to your next provider. Your primary care clinician is listed as Malathi Vaca. If you have any questions after today's visit, please call [phone].

## 2017-07-17 ENCOUNTER — OFFICE VISIT (OUTPATIENT)
Dept: INTERNAL MEDICINE CLINIC | Age: 52
End: 2017-07-17

## 2017-07-17 VITALS
HEART RATE: 79 BPM | RESPIRATION RATE: 16 BRPM | TEMPERATURE: 98.3 F | WEIGHT: 160.3 LBS | DIASTOLIC BLOOD PRESSURE: 80 MMHG | HEIGHT: 66 IN | SYSTOLIC BLOOD PRESSURE: 109 MMHG | OXYGEN SATURATION: 99 % | BODY MASS INDEX: 25.76 KG/M2

## 2017-07-17 DIAGNOSIS — Z51.81 ANTICOAGULATION MANAGEMENT ENCOUNTER: Primary | ICD-10-CM

## 2017-07-17 DIAGNOSIS — Z12.11 COLON CANCER SCREENING: ICD-10-CM

## 2017-07-17 DIAGNOSIS — Z79.01 ANTICOAGULATION MANAGEMENT ENCOUNTER: Primary | ICD-10-CM

## 2017-07-17 DIAGNOSIS — F79 MR (MENTAL RETARDATION): ICD-10-CM

## 2017-07-17 DIAGNOSIS — Z91.14 NONCOMPLIANCE WITH MEDICATION REGIMEN: ICD-10-CM

## 2017-07-17 LAB
INR BLD: 1.7
PT POC: NORMAL SECONDS
VALID INTERNAL CONTROL?: YES

## 2017-07-17 NOTE — MR AVS SNAPSHOT
Visit Information Date & Time Provider Department Dept. Phone Encounter #  
 7/17/2017  9:00 AM Tim Ventura, 1404 [facility] [phone] [id number] Follow-up Instructions Return in about 1 week (around 7/24/2017) for recheck pt/inr . Upcoming Health Maintenance Date Due FOBT Q 1 YEAR AGE 50-75 11/8/2015 INFLUENZA AGE 9 TO ADULT 8/1/2017 DTaP/Tdap/Td series (2 - Td) 4/15/2025 Allergies as of 7/17/2017  Review Complete On: 7/17/2017 By: Sanjana Vo No Known Allergies Current Immunizations  Reviewed on 4/15/2015 Name Date Influenza Vaccine 11/25/2013 Influenza Vaccine Intradermal PF 11/2/2015, 10/6/2014 Tdap 4/15/2015, 6/9/2013 Not reviewed this visit You Were Diagnosed With   
  
 Codes Comments Anticoagulation management encounter    -  Primary ICD-10-CM: Z51.81, Z79.01 
ICD-9-CM: V58.83, V58.61 Colon cancer screening     ICD-10-CM: Z12.11 ICD-9-CM: V76.51 Vitals BP Pulse Temp Resp Height(growth percentile) Weight(growth percentile) 109/80 (BP 1 Location: Left arm, BP Patient Position: Sitting) 79 98.3 °F (36.8 °C) (Oral) 16 5' 6\" (1.676 m) 160 lb 4.8 oz (72.7 kg) SpO2 BMI Smoking Status 99% 25.87 kg/m2 Never Smoker BMI and BSA Data Body Mass Index Body Surface Area  
 25.87 kg/m 2 1.84 m 2 Preferred Pharmacy [facility]acy Name Phone [facility] [address] [phone] Your Updated Medication List  
  
   
This list is accurate as of: 7/17/17 10:13 AM.  Always use your most recent med list.  
  
  
  
  
 * divalproex  mg tablet Commonly known as:  DEPAKOTE  
1 po QHS * divalproex  mg tablet Commonly known as:  DEPAKOTE  
1 in am and 1 in the pm  
  
 levETIRAcetam 1,000 mg tablet TAKE ONE TAB BY MOUTH EVERY MORNING AND A 1/2 TAB EVERY EVENING  
  
 lisinopril 10 mg tablet Commonly known as:  Keesha Golder Take 1 Tab by mouth daily. oxyCODONE-acetaminophen 5-325 mg per tablet Commonly known as:  PERCOCET Take 1 Tab by mouth two (2) times daily as needed for Pain. Max Daily Amount: 2 Tabs. pramoxine-hydrocortisone 2.5-1 % topical cream  
Commonly known as:  PROTOFOAM-HC Apply  to affected area three (3) times daily. tadalafil 5 mg tablet Commonly known as:  CIALIS Take 1 Tab by mouth as needed. * warfarin 1 mg tablet Commonly known as:  COUMADIN As directed * warfarin 5 mg tablet Commonly known as:  COUMADIN Use 5 mg  
  
 zonisamide 100 mg capsule Commonly known as:  ZONEGRAN  
TAKE 2 CAPSULES BY MOUTH TWICE A DAY * Notice: This list has 4 medication(s) that are the same as other medications prescribed for you. Read the directions carefully, and ask your doctor or other care provider to review them with you. We Performed the Following AMB POC PT/INR [41621 CPT(R)] OCCULT BLOOD, IMMUNOASSAY (FIT) [id number] CPT(R)] Follow-up Instructions Return in about 1 week (around 7/24/2017) for recheck pt/inr . Patient Instructions Restart your coumadin 5mg and 6.5 alternating  Today take the 6.5 mg do not miss any doses if your drinking only take 5 mg Again take the coumadin in the evening Introducing [facility]! Bianca Sherwood introduces [employer] patient portal. Now you can access parts of your medical record, email your doctor's office, and request medication refills online. 1. In your internet browser, go to [email or web address] 2. Click on the First Time User? Click Here link in the Sign In box. You will see the New Member Sign Up page. 3. Enter your SpunLive Access Code exactly as it appears below. You will not need to use this code after youve completed the sign-up process.  If you do not sign up before the expiration date, you must request a new code. · Ivalua Access Code: [email or web address] Expires: 9/14/2017  9:50 AM 
 
4. Enter the last four digits of your Social Security Number (xxxx) and Date of Birth (mm/dd/yyyy) as indicated and click Submit. You will be taken to the next sign-up page. 5. Create a Ivalua ID. This will be your Ivalua login ID and cannot be changed, so think of one that is secure and easy to remember. 6. Create a Ivalua password. You can change your password at any time. 7. Enter your Password Reset Question and Answer. This can be used at a later time if you forget your password. 8. Enter your e-mail address. You will receive e-mail notification when new information is available in [address] 9. Click Sign Up. You can now view and download portions of your medical record. 10. Click the Download Summary menu link to download a portable copy of your medical information. If you have questions, please visit the Frequently Asked Questions section of the Ivalua website. Remember, Ivalua is NOT to be used for urgent needs. For medical emergencies, dial 911. Now available from your iPhone and Android! Please provide this summary of care documentation to your next provider. Your primary care clinician is listed as Marla Nicole. If you have any questions after today's visit, please call [phone].

## 2017-07-17 NOTE — PATIENT INSTRUCTIONS
Restart your coumadin 5mg and 6.5 alternating  Today take the 6.5 mg do not miss any doses if your drinking only take 5 mg   Again take the coumadin in the evening

## 2017-07-17 NOTE — PROGRESS NOTES
Chief Complaint   Patient presents with    Anticoagulation     (RM 1)           Subjective:   Cristiane Billingsley 46 y.o.  male with a  past medical history reviewed see below. comes in today for recheck of pt/inr   Denies missing any doses of medication but  he did not take any medicaiton yesterday   \has had diet changes  Denies sob and cp   Normally using 5 mg   He then states he took his coumadin this am.       ROS: otherwise feeling generally well. All other systems reviewed and are negative      Current Outpatient Prescriptions   Medication Sig Dispense Refill    levETIRAcetam 1,000 mg tablet TAKE ONE TAB BY MOUTH EVERY MORNING AND A 1/2 TAB EVERY EVENING 90 Tab 3    divalproex DR (DEPAKOTE) 250 mg tablet 1 po QHS 30 Tab 4    warfarin (COUMADIN) 1 mg tablet As directed 100 Tab 11    warfarin (COUMADIN) 5 mg tablet Use 5 mg 60 Tab 11    lisinopril (PRINIVIL, ZESTRIL) 10 mg tablet Take 1 Tab by mouth daily. 30 Tab 4    zonisamide (ZONEGRAN) 100 mg capsule TAKE 2 CAPSULES BY MOUTH TWICE A DAY 60 Cap 4    divalproex DR (DEPAKOTE) 500 mg tablet 1 in am and 1 in the pm 60 Tab 4    tadalafil (CIALIS) 5 mg tablet Take 1 Tab by mouth as needed. 30 Tab 0    pramoxine-hydrocortisone (PROTOFOAM-HC) 2.5-1 % topical cream Apply  to affected area three (3) times daily. 10 g 0    oxyCODONE-acetaminophen (PERCOCET) 5-325 mg per tablet Take 1 Tab by mouth two (2) times daily as needed for Pain. Max Daily Amount: 2 Tabs.  61 Tab 0     No Known Allergies  Past Medical History:   Diagnosis Date    DVT (deep venous thrombosis) ([facility] [address] 7/2/2013    Presence of IVC filter     Pulmonary embolism ([address] 7/4/2013    Seizures ([facility] [address]     monthly seizures- sometimes several per month- managed by PCP at this time, per caregiver    Trauma     hit in head wiht board playing [employer]      Past Surgical History:   Procedure Laterality Date    HX CRANIOTOMY  2006    Trauma repair    VASCULAR SURGERY PROCEDURE UNLIST  2013    IVC filter Family History   Problem Relation Age of Onset    Alcohol abuse Father 21      of hepatic cirrhosis in his 45s. Was drinking 4-5 bottles of wine daily.  Hypertension Mother 61    COPD Mother     Sleep Apnea Mother      Social History   Substance Use Topics    Smoking status: Never Smoker    Smokeless tobacco: Never Used    Alcohol use 5.0 oz/week     10 Cans of beer per week          Objective:     Visit Vitals    /80 (BP 1 Location: Left arm, BP Patient Position: Sitting)    Pulse 79    Temp 98.3 °F (36.8 °C) (Oral)    Resp 16    Ht 5' 6\" (1.676 m)    Wt 160 lb 4.8 oz (72.7 kg)    SpO2 99%    BMI 25.87 kg/m2     Gen: NAD, pleasant  HEENT: normal appearing head, nares patent, PERRLA, EOMI, oropharynx no erythema, no cervical lymphadenopathy neck supple   Cardio: RRR nl S1S2 no murmur  Lungs CTAB no wheeze no rales no rhonchi  ABD Soft non tender non distended + bowel sounds  Extremities: full ROM X 4 no clubbing no cyanosis  Neuro: no gross focal deficits noted, alert and orientated X 3  Psych.: well groomed no outward signs of depression. Assessment/Plan:   Diagnoses and all orders for this visit:    1. Anticoagulation management encounter  -     AMB POC PT/INR    2. Noncompliance with medication regimen    3. Colon cancer screening  -     OCCULT BLOOD, IMMUNOASSAY (FIT)    4. MR (mental retardation)      Follow-up Disposition:  Return in about 1 week (around 2017) for recheck pt/inr . .  avs printed and given to the pt. .  Again d/w pt need to take coumadin in the evening doubt compliance   The patient voiced understanding of the above. Medication side effects were reviewed with the patient. Call with any concerns.

## 2017-07-25 LAB — HEMOCCULT STL QL IA: NEGATIVE

## 2017-08-04 ENCOUNTER — CLINICAL SUPPORT (OUTPATIENT)
Dept: INTERNAL MEDICINE CLINIC | Age: 52
End: 2017-08-04

## 2017-08-04 VITALS
HEIGHT: 66 IN | TEMPERATURE: 98.1 F | OXYGEN SATURATION: 100 % | DIASTOLIC BLOOD PRESSURE: 70 MMHG | WEIGHT: 162 LBS | RESPIRATION RATE: 19 BRPM | BODY MASS INDEX: 26.03 KG/M2 | HEART RATE: 71 BPM | SYSTOLIC BLOOD PRESSURE: 110 MMHG

## 2017-08-04 DIAGNOSIS — Z51.81 ANTICOAGULATION MANAGEMENT ENCOUNTER: ICD-10-CM

## 2017-08-04 DIAGNOSIS — Z79.01 ANTICOAGULATION MANAGEMENT ENCOUNTER: ICD-10-CM

## 2017-08-04 DIAGNOSIS — Z51.81 SUBTHERAPEUTIC ANTICOAGULATION: Primary | ICD-10-CM

## 2017-08-04 DIAGNOSIS — Z79.01 SUBTHERAPEUTIC ANTICOAGULATION: Primary | ICD-10-CM

## 2017-08-04 LAB
INR BLD: 1.5
PT POC: NORMAL SECONDS
VALID INTERNAL CONTROL?: YES

## 2017-08-04 NOTE — MR AVS SNAPSHOT
Visit Information Date & Time Provider Department Dept. Phone Encounter #  
 8/4/2017 10:00  [facility] [place], [address] [address]  ClaireHawkins [id number] Follow-up Instructions Return in about 1 week (around 8/11/2017) for recheck pt/inr . Upcoming Health Maintenance Date Due INFLUENZA AGE 9 TO ADULT 8/1/2017 FOBT Q 1 YEAR AGE 50-75 7/17/2018 DTaP/Tdap/Td series (2 - Td) 4/15/2025 Allergies as of 8/4/2017  Review Complete On: 7/17/2017 By: Robb Cerna No Known Allergies Current Immunizations  Reviewed on 4/15/2015 Name Date Influenza Vaccine 11/25/2013 Influenza Vaccine Intradermal PF 11/2/2015, 10/6/2014 Tdap 4/15/2015, 6/9/2013 Not reviewed this visit You Were Diagnosed With   
  
 Codes Comments Subtherapeutic anticoagulation    -  Primary ICD-10-CM: Z51.81, Z79.01 
ICD-9-CM: V58.83, V58.61 Anticoagulation management encounter     ICD-10-CM: Z51.81, Z79.01 
ICD-9-CM: V58.83, V58.61 Vitals Smoking Status Never Smoker [facility]erred [facility] [address] [place] [address] [phone] Your Updated Medication List  
  
   
This list is accurate as of: 8/4/17 10:48 AM.  Always use your most recent med list.  
  
  
  
  
 apixaban 5 mg tablet Commonly known as:  Albertothania Flores Take 1 Tab by mouth two (2) times a day. * divalproex  mg tablet Commonly known as:  DEPAKOTE  
1 po QHS * divalproex  mg tablet Commonly known as:  DEPAKOTE  
1 in am and 1 in the pm  
  
 levETIRAcetam 1,000 mg tablet TAKE ONE TAB BY MOUTH EVERY MORNING AND A 1/2 TAB EVERY EVENING  
  
 lisinopril 10 mg tablet Commonly known as:  Valene Pencil Take 1 Tab by mouth daily. oxyCODONE-acetaminophen 5-325 mg per tablet Commonly known as:  PERCOCET  
 Take 1 Tab by mouth two (2) times daily as needed for Pain. Max Daily Amount: 2 Tabs. pramoxine-hydrocortisone 2.5-1 % topical cream  
Commonly known as:  PROTOFOAM-HC Apply  to affected area three (3) times daily. tadalafil 5 mg tablet Commonly known as:  CIALIS Take 1 Tab by mouth as needed. * warfarin 1 mg tablet Commonly known as:  COUMADIN As directed * warfarin 5 mg tablet Commonly known as:  COUMADIN Use 5 mg  
  
 zonisamide 100 mg capsule Commonly known as:  ZONEGRAN  
TAKE 2 CAPSULES BY MOUTH TWICE A DAY * Notice: This list has 4 medication(s) that are the same as other medications prescribed for you. Read the directions carefully, and ask your doctor or other care provider to review them with you. Prescriptions Sent to Pharmacy Refills  
 apixaban (ELIQUIS) 5 mg tablet 0 Sig: Take 1 Tab by mouth two (2) times a day. Class: Normal  
 Pharmacy: [address] [place] [address] #: [phone] Route: Oral  
  
We Performed the Following AMB POC PT/INR [61615 CPT(R)] Follow-up Instructions Return in about 1 week (around 8/11/2017) for recheck pt/inr . Introducing [facility] & HE[facility] SERVICES! Asa Huggins introduces [employer] patient portal. Now you can access parts of your medical record, email your doctor's office, and request medication refills online. 1. In your internet browser, go to [email or web address] 2. Click on the First Time User? Click Here link in the Sign In box. You will see the New Member Sign Up page. 3. Enter your Oxsensis Access Code exactly as it appears below. You will not need to use this code after youve completed the sign-up process. If you do not sign up before the expiration date, you must request a new code. · Oxsensis Access Code: [email or web address] Expires: 9/14/2017  9:50 AM 
 
 4. Enter the last four digits of your Social Security Number (xxxx) and Date of Birth (mm/dd/yyyy) as indicated and click Submit. You will be taken to the next sign-up page. 5. Create a AUM [employer]ovascular ID. This will be your [employer] login ID and cannot be changed, so think of one that is secure and easy to remember. 6. Create a [employer] password. You can change your password at any time. 7. Enter your Password Reset Question and Answer. This can be used at a later time if you forget your password. 8. Enter your e-mail address. You will receive e-mail notification when new information is available in [address] 9. Click Sign Up. You can now view and download portions of your medical record. 10. Click the Download Summary menu link to download a portable copy of your medical information. If you have questions, please visit the Frequently Asked Questions section of the [employer] website. Remember, [employer] is NOT to be used for urgent needs. For medical emergencies, dial 911. Now available from your iPhone and Android! Please provide this summary of care documentation to your next provider. Your primary care clinician is listed as Isa Giang. If you have any questions after today's visit, please call [phone].

## 2017-08-04 NOTE — PROGRESS NOTES
C/c PT/INR  Subjective:   Marilin Golden 46 y.o.  male with a  past medical history reviewed see below. presents today for Anticoagulation monitoring. Indication: DVT and PE  INR Goal: 2.0-3.0. Current dose:  Coumadin 6mg daily. Missed Coumadin Doses:  None  Medication Changes:  no  Dietary Changes:  no    Symptoms: taking coumadin appropriately without any bleeding. Latest INRs:  Lab Results   Component Value Date/Time    INR 2.0 02/08/2014 02:15 PM    INR 1.1 07/04/2013 02:30 PM    INR POC 1.5 08/04/2017 10:27 AM    INR POC 1.7 07/17/2017 09:50 AM    INR POC 2.5 06/19/2017 09:19 AM    Prothrombin time 19.5 02/08/2014 02:15 PM    Prothrombin time 11.3 07/04/2013 02:30 PM        ROS:. All other systems reviewed and are negative      Current Outpatient Prescriptions   Medication Sig Dispense Refill    apixaban (ELIQUIS) 5 mg tablet Take 1 Tab by mouth two (2) times a day. 60 Tab 0    divalproex DR (DEPAKOTE) 500 mg tablet TAKE 1 TAB IN MORNING AND EVERY EVENING 60 Tab 1    lisinopril (PRINIVIL, ZESTRIL) 10 mg tablet TAKE 1 TAB BY MOUTH DAILY. 30 Tab 1    levETIRAcetam 1,000 mg tablet TAKE ONE TAB BY MOUTH EVERY MORNING AND A 1/2 TAB EVERY EVENING 90 Tab 3    divalproex DR (DEPAKOTE) 250 mg tablet 1 po QHS 30 Tab 4    warfarin (COUMADIN) 1 mg tablet As directed 100 Tab 11    warfarin (COUMADIN) 5 mg tablet Use 5 mg 60 Tab 11    zonisamide (ZONEGRAN) 100 mg capsule TAKE 2 CAPSULES BY MOUTH TWICE A DAY 60 Cap 4    tadalafil (CIALIS) 5 mg tablet Take 1 Tab by mouth as needed. 30 Tab 0    oxyCODONE-acetaminophen (PERCOCET) 5-325 mg per tablet Take 1 Tab by mouth two (2) times daily as needed for Pain. Max Daily Amount: 2 Tabs. 60 Tab 0    pramoxine-hydrocortisone (PROTOFOAM-HC) 2.5-1 % topical cream Apply  to affected area three (3) times daily.  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 Was drinking 4-5 bottles of wine daily.  Hypertension Mother 61    COPD Mother     Sleep Apnea Mother      Social History   Substance Use Topics    Smoking status: Never Smoker    Smokeless tobacco: Never Used    Alcohol use 5.0 oz/week     10 Cans of beer per week          Objective:     Visit Vitals    /70 (BP 1 Location: Right arm, BP Patient Position: Sitting)    Pulse 71    Temp 98.1 °F (36.7 °C)    Resp 19    Ht 5' 6\" (1.676 m)    Wt 162 lb (73.5 kg)    SpO2 100%    BMI 26.15 kg/m2     Gen: NAD, pleasant  Cardio: RRR nl S1 S2 no murmur  Lungs CTAB no wheeze no rales no rhonchi  Skin:no bruising noted no calf tenderness     Assessment/Plan:   Diagnoses and all orders for this visit:    1. Subtherapeutic anticoagulation    2. Anticoagulation management encounter  -     AMB POC PT/INR    Other orders  -     apixaban (ELIQUIS) 5 mg tablet; Take 1 Tab by mouth two (2) times a day. Follow-up Disposition:  Return in about 1 week (around 2017) for recheck pt/inr .. Increase the coumadin to 6.5 mg every night and once the eliquis is filled stop the coumadin and only take the eleiquis   The patient voiced understanding of the above. Medication side effects were reviewed with the patient. Call with any concerns.

## 2017-09-11 RX ORDER — DIVALPROEX SODIUM 250 MG/1
TABLET, DELAYED RELEASE ORAL
Qty: 30 TAB | Refills: 4 | Status: SHIPPED | OUTPATIENT
Start: 2017-09-11 | End: 2018-03-02 | Stop reason: SDUPTHER

## 2017-09-15 ENCOUNTER — OFFICE VISIT (OUTPATIENT)
Dept: INTERNAL MEDICINE CLINIC | Age: 52
End: 2017-09-15

## 2017-09-15 VITALS
WEIGHT: 162.2 LBS | OXYGEN SATURATION: 99 % | HEIGHT: 66 IN | HEART RATE: 90 BPM | RESPIRATION RATE: 18 BRPM | DIASTOLIC BLOOD PRESSURE: 87 MMHG | TEMPERATURE: 98.2 F | SYSTOLIC BLOOD PRESSURE: 121 MMHG | BODY MASS INDEX: 26.07 KG/M2

## 2017-09-15 DIAGNOSIS — I82.401 DEEP VEIN THROMBOSIS (DVT) OF RIGHT LOWER EXTREMITY, UNSPECIFIED CHRONICITY, UNSPECIFIED VEIN (HCC): Primary | ICD-10-CM

## 2017-09-15 DIAGNOSIS — I10 ESSENTIAL HYPERTENSION: ICD-10-CM

## 2017-09-15 DIAGNOSIS — R56.9 SEIZURES (HCC): ICD-10-CM

## 2017-09-15 LAB
INR BLD: 2.9
PT POC: NORMAL SECONDS
VALID INTERNAL CONTROL?: YES

## 2017-09-15 RX ORDER — DIVALPROEX SODIUM 500 MG/1
TABLET, DELAYED RELEASE ORAL
Qty: 60 TAB | Refills: 5 | Status: SHIPPED | OUTPATIENT
Start: 2017-09-15 | End: 2018-04-15 | Stop reason: SDUPTHER

## 2017-09-15 RX ORDER — ZONISAMIDE 100 MG/1
CAPSULE ORAL
Qty: 120 CAP | Refills: 4 | Status: SHIPPED | OUTPATIENT
Start: 2017-09-15 | End: 2018-09-13 | Stop reason: ALTCHOICE

## 2017-09-15 RX ORDER — LISINOPRIL 10 MG/1
TABLET ORAL
Qty: 30 TAB | Refills: 5 | Status: SHIPPED | OUTPATIENT
Start: 2017-09-15 | End: 2018-04-20 | Stop reason: SDUPTHER

## 2017-09-15 NOTE — MR AVS SNAPSHOT
Visit Information Date & Time Provider Department Dept. Phone Encounter #  
 9/15/2017  1:30 PM Hakeem Maharaj, [facility] [id number] Follow-up Instructions Return in about 2 weeks (around 9/29/2017) for 2 weeks w nurse and 3 mths w me. Upcoming Health Maintenance Date Due FOBT Q 1 YEAR AGE 50-75 7/17/2018 DTaP/Tdap/Td series (2 - Td) 4/15/2025 Allergies as of 9/15/2017  Review Complete On: 9/15/2017 By: Hakeem Maharaj MD  
 No Known Allergies Current Immunizations  Reviewed on 4/15/2015 Name Date Influenza Vaccine 11/25/2013 Influenza Vaccine Intradermal PF 11/2/2015, 10/6/2014 Tdap 4/15/2015, 6/9/2013 Not reviewed this visit You Were Diagnosed With   
  
 Codes Comments Deep vein thrombosis (DVT) of right lower extremity, unspecified chronicity, unspecified vein (HCC)    -  Primary ICD-10-CM: I82.401 ICD-9-CM: 453.40 Seizures ([address]     ICD-10-CM: R56.9 ICD-9-CM: 780.39 Essential hypertension     ICD-10-CM: I10 
ICD-9-CM: 401.9 Vitals BP Pulse Temp Resp Height(growth percentile) Weight(growth percentile) 121/87 (BP 1 Location: Left arm, BP Patient Position: Sitting) 90 98.2 °F (36.8 °C) (Oral) 18 5' 6\" (1.676 m) 162 lb 3.2 oz (73.6 kg) SpO2 BMI Smoking Status 99% 26.18 kg/m2 Never Smoker Vitals History BMI and BSA Data Body Mass Index Body Surface Area  
 26.18 kg/m 2 1.85 m 2 Preferred Pharmacy Pharmacy Name Phone [facility] [address] [phone] Your Updated Medication List  
  
   
This list is accurate as of: 9/15/17  2:23 PM.  Always use your most recent med list.  
  
  
  
  
 apixaban 5 mg tablet Commonly known as:  Margarie Nico Take 1 Tab by mouth two (2) times a day. * divalproex  mg tablet Commonly known as:  DEPAKOTE  
 TAKE ONE TAB BY MOUTH EVERY NIGHT AT BEDTIME  
  
 * divalproex  mg tablet Commonly known as:  DEPAKOTE  
TAKE 1 TAB IN MORNING AND EVERY EVENING  
  
 levETIRAcetam 1,000 mg tablet TAKE ONE TAB BY MOUTH EVERY MORNING AND A 1/2 TAB EVERY EVENING  
  
 lisinopril 10 mg tablet Commonly known as:  PRINIVIL, ZESTRIL  
TAKE 1 TAB BY MOUTH DAILY. oxyCODONE-acetaminophen 5-325 mg per tablet Commonly known as:  PERCOCET Take 1 Tab by mouth two (2) times daily as needed for Pain. Max Daily Amount: 2 Tabs. pramoxine-hydrocortisone 2.5-1 % topical cream  
Commonly known as:  PROTOFOAM-HC Apply  to affected area three (3) times daily. tadalafil 5 mg tablet Commonly known as:  CIALIS Take 1 Tab by mouth as needed. * warfarin 1 mg tablet Commonly known as:  COUMADIN As directed * warfarin 5 mg tablet Commonly known as:  COUMADIN Use 5 mg  
  
 zonisamide 100 mg capsule Commonly known as:  ZONEGRAN  
TAKE 2 CAPSULES BY MOUTH TWICE A DAY * Notice: This list has 4 medication(s) that are the same as other medications prescribed for you. Read the directions carefully, and ask your doctor or other care provider to review them with you. Prescriptions Sent to Pharmacy Refills  
 divalproex DR (DEPAKOTE) 500 mg tablet 5 Sig: TAKE 1 TAB IN MORNING AND EVERY EVENING Class: Normal  
 Pharmacy: [facility]/pharmacy [place] [address] Ph #: [phone]  
 zonisamide (ZONEGRAN) 100 mg capsule 4 Sig: TAKE 2 CAPSULES BY MOUTH TWICE A DAY Class: Normal  
 Pharmacy: [address] [place] [address] Ph #: [phone]  
 lisinopril (PRINIVIL, ZESTRIL) 10 mg tablet 5 Sig: TAKE 1 TAB BY MOUTH DAILY. Class: Normal  
 Pharmacy: [address] [place] [address] Ph #: [phone] We Performed the Following AMB POC PT/INR [08658 CPT(R)] REFERRAL TO NEUROLOGY [UPY56 Custom] Comments:  
 Please evaluate patient for seizure d/o Follow-up Instructions Return in about 2 weeks (around 9/29/2017) for 2 weeks w nurse and 3 mths w me. Referral Information Referral ID Referred By Referred To  
  
 [id number] [facility] [address], [address] [place] [address] Visits Status Start Date End Date 1 New Request 9/15/17 9/15/18 If your referral has a status of pending review or denied, additional information will be sent to support the outcome of this decision. Patient Instructions Warfarin (By mouth) Warfarin (WAR-far-in) Prevents and treats blood clots. May lower the risk of serious complications after a heart attack. This medicine is a blood thinner. Brand Name(s): Brent Gonzalez There may be other brand names for this medicine. When This Medicine Should Not Be Used: This medicine is not right for everyone. Do not use it if you had an allergic reaction to warfarin, if you are pregnant, or if you have health problems that could cause bleeding. How to Use This Medicine:  
Tablet · Take your medicine as directed. Your dose may need to be changed several times to find what works best for you. · This medicine should come with a Medication Guide. Ask your pharmacist for a copy if you do not have one. · Missed dose: Take a dose as soon as you remember. If it is almost time for your next dose, wait until then and take a regular dose. Do not take extra medicine to make up for a missed dose. · Store the medicine in a closed container at room temperature, away from heat, moisture, and direct light. Drugs and Foods to Avoid: Ask your doctor or pharmacist before using any other medicine, including over-the-counter medicines, vitamins, and herbal products.  
· Many medicines and foods can affect how warfarin works and may affect the PT/INR test results. Tell your doctor before you start or stop any medicine, especially the following: ¨ Ginkgo, echinacea, goldenseal, or Uli's wort ¨ Another blood thinner, including apixaban, argatroban, bivalirudin, cilostazol, clopidogrel, dabigatran, desirudin, dipyridamole, heparin, lepirudin, prasugrel, rivaroxaban, ticlopidine ¨ Medicine to treat depression or anxiety, including citalopram, desvenlafaxine, duloxetine, escitalopram, fluoxetine, fluvoxamine, milnacipran, paroxetine, sertraline, venlafaxine, vilazodone ¨ Medicine to treat an infection ¨ NSAID pain or arthritis medicine, including aspirin, celecoxib, diclofenac, diflunisal, fenoprofen, ibuprofen, indomethacin, ketoprofen, ketorolac, mefenamic acid, naproxen, oxaprozin, piroxicam, sulindac. Check labels for over-the-counter medicines to find out if they contain an NSAID. ¨ Steroid medicine, including dexamethasone, hydrocortisone, methylprednisolone, prednisolone, prednisone · Warfarin works best if you eat about the same amount of vitamin K every day. Foods high in vitamin K include asparagus, broccoli, brussels sprouts, cabbage, green leafy vegetables, plums, rhubarb, and canola oil. Talk to your doctor before you make changes to your normal diet. · Do not eat grapefruit or drink grapefruit juice while you are using this medicine. Warnings While Using This Medicine: · It is not safe to take this medicine during pregnancy. It could harm an unborn baby. Tell your doctor right away if you become pregnant. Use an effective form of birth control to keep from getting pregnant during treatment and for at least 1 month after your last dose. · Tell your doctor if you are breastfeeding, or if you have kidney disease, liver disease, diabetes, heart disease, heart failure, high blood pressure, an infection, a stomach ulcer, or protein C deficiency.  Also tell your doctor if you had recent surgery or an injury, or a history of stroke, anemia, severe bleeding or bruising, or problems caused by heparin use. · This medicine may cause the following problems: ¨ Bleeding, which may be life-threatening ¨ Gangrene (skin or tissue damage) ¨ Calciphylaxis or calcium uremic arteriolopathy ¨ Purple toes syndrome · You must have a PT/INR blood test while you use this medicine to check how well your blood is clotting. Your doctor will use the test results to make sure the medicine is working properly. Keep all appointments for the PT/INR blood tests. · You may bleed or bruise more easily with warfarin. To prevent injury or cuts, do not play rough sports, be careful with sharp objects, and brush and floss your teeth gently. Izzy Risen your nose gently, and do not pick your nose. · Carry an ID card or wear a medical alert bracelet to let emergency caregivers know that you use warfarin. · Tell any doctor or dentist who treats you that you are using this medicine. You may need to stop using this medicine several days before you have surgery or medical tests. · Keep all medicine out of the reach of children. Never share your medicine with anyone. Possible Side Effects While Using This Medicine:  
Call your doctor right away if you notice any of these side effects: · Allergic reaction: Itching or hives, swelling in your face or hands, swelling or tingling in your mouth or throat, chest tightness, trouble breathing · Bleeding from your gums or nose, coughing up blood, heavy monthly periods · Bleeding that does not stop, bruising, or weakness · Dizziness, fainting, lightheadedness · Pain, brown or black skin, or skin that is cool to the touch · Purple toes or feet, or new pain in your leg, foot, or toes · Purplish red, net-like, blotchy spots on the skin · Red or dark brown urine, or red or black, tarry stools · Vomiting blood or material that looks like coffee grounds If you notice other side effects that you think are caused by this medicine, tell your doctor. Call your doctor for medical advice about side effects. You may report side effects to FDA at 4-292-MOH-2244 © 2017 [facility] Information is for End User's use only and may not be sold, redistributed or otherwise used for commercial purposes. The above information is an  only. It is not intended as medical advice for individual conditions or treatments. Talk to your doctor, nurse or pharmacist before following any medical regimen to see if it is safe and effective for you. Consistent Vitamin K Diet: Care Instructions Your Care Instructions Your body needs vitamin K to clot blood and keep your bones strong. It's found in leafy green vegetables such as kale and spinach. If you take the blood thinner warfarin (Coumadin), you need to be careful about how much vitamin K you get. Vitamin K can keep your warfarin from working as it should. Most people who take warfarin can eat normally. The important thing is to get about the same amount of vitamin K each day. Don't suddenly start eating foods with a lot more or a lot less vitamin K. You can choose how much vitamin K you eat. For example, if you already eat a lot of leafy green vegetables, that's fine. Just keep it about the same amount each day. Follow-up care is a key part of your treatment and safety. Be sure to make and go to all appointments, and call your doctor if you are having problems. It's also a good idea to know your test results and keep a list of the medicines you take. How can you care for yourself at home? You don't need to stop eating food high in vitamin K. But you do need to know what foods contain vitamin K. Then you can try to eat about the same amount of vitamin K each day. · You might limit foods that are high in vitamin K to about 1 serving a day. These foods have about 250 to 500 micrograms (mcg) of vitamin K in each serving. They include: ¨ Cooked leafy green vegetables. Examples are kale, spinach, turnip greens, juan jose greens, Swiss chard, and mustard greens. One serving is ½ cup. · You might limit foods that are medium-high in vitamin K to about 3 servings a day. These foods have about 50 to 150 mcg of vitamin K in each serving. These include: ¨ Cooked brussels sprouts, broccoli, cabbage, and asparagus. One serving is ½ cup. ¨ Raw leafy green vegetables. Examples are spinach, green leaf lettuce, pratima lettuce, and endive. One serving is 1 cup. · Vitamin K also is found in many multivitamins. You don't need to stop taking your multivitamin if it has vitamin K. But you do need to take it every day. · Check with your doctor before you start or stop taking any supplements or herbal products. Some of these may contain vitamin K. Where can you learn more? Go to http://nicholas-stella.info/. Enter B252 in the search box to learn more about \"Consistent Vitamin K Diet: Care Instructions. \" Current as of: March 2, 2017 Content Version: 11.3 © 7047-4024 [employer]. Care instructions adapted under license by [employer] (which disclaims liability or warranty for this information). If you have questions about a medical condition or this instruction, always ask your healthcare professional. [place] [address] any warranty or liability for your use of this information. Introducing [facility]! [facility] introduces [employer] patient portal. Now you can access parts of your medical record, email your doctor's office, and request medication refills online. 1. In your internet browser, go to [email or web address] 2. Click on the First Time User? Click Here link in the Sign In box. You will see the New Member Sign Up page. 3. Enter your [employer] Access Code exactly as it appears below.  You will not need to use this code after youve completed the sign-up process. If you do not sign up before the expiration date, you must request a new code. · [employer] [place] Access Code: [email or web address] Expires: 12/14/2017  1:27 PM 
 
4. Enter the last four digits of your Social Security Number (xxxx) and Date of Birth (mm/dd/yyyy) as indicated and click Submit. You will be taken to the next sign-up page. 5. Create a [place] ID. This will be your [employer] [place] login ID and cannot be changed, so think of one that is secure and easy to remember. 6. Create a [place] password. You can change your password at any time. 7. Enter your Password Reset Question and Answer. This can be used at a later time if you forget your password. 8. Enter your e-mail address. You will receive e-mail notification when new information is available in [address]. 9. Click Sign Up. You can now view and download portions of your medical record. 10. Click the Download Summary menu link to download a portable copy of your medical information. If you have questions, please visit the Frequently Asked Questions section of the [employer] [place] [place] website. Remember, [employer] [place] is NOT to be used for urgent needs. For medical emergencies, dial 911. Now available from your iPhone and Android! Please provide this summary of care documentation to your next provider. Your primary care clinician is listed as Clover Stevens. If you have any questions after today's visit, please call [phone].

## 2017-09-15 NOTE — PATIENT INSTRUCTIONS
Warfarin (By mouth)   Warfarin (WAR-far-in)  Prevents and treats blood clots. May lower the risk of serious complications after a heart attack. This medicine is a blood thinner. Brand Name(s): Coumadin, Jantoven   There may be other brand names for this medicine. When This Medicine Should Not Be Used: This medicine is not right for everyone. Do not use it if you had an allergic reaction to warfarin, if you are pregnant, or if you have health problems that could cause bleeding. How to Use This Medicine:   Tablet  · Take your medicine as directed. Your dose may need to be changed several times to find what works best for you. · This medicine should come with a Medication Guide. Ask your pharmacist for a copy if you do not have one. · Missed dose: Take a dose as soon as you remember. If it is almost time for your next dose, wait until then and take a regular dose. Do not take extra medicine to make up for a missed dose. · Store the medicine in a closed container at room temperature, away from heat, moisture, and direct light. Drugs and Foods to Avoid:   Ask your doctor or pharmacist before using any other medicine, including over-the-counter medicines, vitamins, and herbal products. · Many medicines and foods can affect how warfarin works and may affect the PT/INR test results.  Tell your doctor before you start or stop any medicine, especially the following:   ¨ Ginkgo, echinacea, goldenseal, or Uli's wort  ¨ Another blood thinner, including apixaban, argatroban, bivalirudin, cilostazol, clopidogrel, dabigatran, desirudin, dipyridamole, heparin, lepirudin, prasugrel, rivaroxaban, ticlopidine  ¨ Medicine to treat depression or anxiety, including citalopram, desvenlafaxine, duloxetine, escitalopram, fluoxetine, fluvoxamine, milnacipran, paroxetine, sertraline, venlafaxine, vilazodone  ¨ Medicine to treat an infection  ¨ NSAID pain or arthritis medicine, including aspirin, celecoxib, diclofenac, diflunisal, fenoprofen, ibuprofen, indomethacin, ketoprofen, ketorolac, mefenamic acid, naproxen, oxaprozin, piroxicam, sulindac. Check labels for over-the-counter medicines to find out if they contain an NSAID. ¨ Steroid medicine, including dexamethasone, hydrocortisone, methylprednisolone, prednisolone, prednisone  · Warfarin works best if you eat about the same amount of vitamin K every day. Foods high in vitamin K include asparagus, broccoli, brussels sprouts, cabbage, green leafy vegetables, plums, rhubarb, and canola oil. Talk to your doctor before you make changes to your normal diet. · Do not eat grapefruit or drink grapefruit juice while you are using this medicine. Warnings While Using This Medicine:   · It is not safe to take this medicine during pregnancy. It could harm an unborn baby. Tell your doctor right away if you become pregnant. Use an effective form of birth control to keep from getting pregnant during treatment and for at least 1 month after your last dose. · Tell your doctor if you are breastfeeding, or if you have kidney disease, liver disease, diabetes, heart disease, heart failure, high blood pressure, an infection, a stomach ulcer, or protein C deficiency. Also tell your doctor if you had recent surgery or an injury, or a history of stroke, anemia, severe bleeding or bruising, or problems caused by heparin use. · This medicine may cause the following problems:   ¨ Bleeding, which may be life-threatening  ¨ Gangrene (skin or tissue damage)  ¨ Calciphylaxis or calcium uremic arteriolopathy  ¨ Purple toes syndrome  · You must have a PT/INR blood test while you use this medicine to check how well your blood is clotting. Your doctor will use the test results to make sure the medicine is working properly. Keep all appointments for the PT/INR blood tests. · You may bleed or bruise more easily with warfarin.  To prevent injury or cuts, do not play rough sports, be careful with sharp objects, and brush and floss your teeth gently. [place] your nose gently, and do not pick your nose. · Carry an ID card or wear a medical alert bracelet to let emergency caregivers know that you use warfarin. · Tell any doctor or dentist who treats you that you are using this medicine. You may need to stop using this medicine several days before you have surgery or medical tests. · Keep all medicine out of the reach of children. Never share your medicine with anyone. Possible Side Effects While Using This Medicine:   Call your doctor right away if you notice any of these side effects:  · Allergic reaction: Itching or hives, swelling in your face or hands, swelling or tingling in your mouth or throat, chest tightness, trouble breathing  · Bleeding from your gums or nose, coughing up blood, heavy monthly periods  · Bleeding that does not stop, bruising, or weakness  · Dizziness, fainting, lightheadedness  · Pain, brown or black skin, or skin that is cool to the touch  · Purple toes or feet, or new pain in your leg, foot, or toes  · Purplish red, net-like, blotchy spots on the skin  · Red or dark brown urine, or red or black, tarry stools  · Vomiting blood or material that looks like coffee grounds  If you notice other side effects that you think are caused by this medicine, tell your doctor. Call your doctor for medical advice about side effects. You may report side effects to FDA at 2-027-FDA-7828  © 2017 [facility] Information is for End User's use only and may not be sold, redistributed or otherwise used for commercial purposes. The above information is an  only. It is not intended as medical advice for individual conditions or treatments. Talk to your doctor, nurse or pharmacist before following any medical regimen to see if it is safe and effective for you.        Consistent Vitamin K Diet: Care Instructions  Your Care Instructions  Your body needs vitamin K to clot blood and keep your bones strong. It's found in leafy green vegetables such as kale and spinach. If you take the blood thinner warfarin (Coumadin), you need to be careful about how much vitamin K you get. Vitamin K can keep your warfarin from working as it should. Most people who take warfarin can eat normally. The important thing is to get about the same amount of vitamin K each day. Don't suddenly start eating foods with a lot more or a lot less vitamin K. You can choose how much vitamin K you eat. For example, if you already eat a lot of leafy green vegetables, that's fine. Just keep it about the same amount each day. Follow-up care is a key part of your treatment and safety. Be sure to make and go to all appointments, and call your doctor if you are having problems. It's also a good idea to know your test results and keep a list of the medicines you take. How can you care for yourself at home? You don't need to stop eating food high in vitamin K. But you do need to know what foods contain vitamin K. Then you can try to eat about the same amount of vitamin K each day. · You might limit foods that are high in vitamin K to about 1 serving a day. These foods have about 250 to 500 micrograms (mcg) of vitamin K in each serving. They include:  ¨ Cooked leafy green vegetables. Examples are kale, spinach, turnip greens, juan jose greens, Swiss chard, and mustard greens. One serving is ½ cup. · You might limit foods that are medium-high in vitamin K to about 3 servings a day. These foods have about 50 to 150 mcg of vitamin K in each serving. These include:  ¨ Cooked brussels sprouts, broccoli, cabbage, and asparagus. One serving is ½ cup. ¨ Raw leafy green vegetables. Examples are spinach, green leaf lettuce, pratima lettuce, and endive. One serving is 1 cup. · Vitamin K also is found in many multivitamins. You don't need to stop taking your multivitamin if it has vitamin K. But you do need to take it every day.   · Check with your doctor before you start or stop taking any supplements or herbal products. Some of these may contain vitamin K. Where can you learn more? Go to http://nicholas-stella.info/. Enter D426 in the search box to learn more about \"Consistent Vitamin K Diet: Care Instructions. \"  Current as of: March 2, 2017  Content Version: 11.3  © 9815-5076 [employer]. Care instructions adapted under license by [employer] (which disclaims liability or warranty for this information). If you have questions about a medical condition or this instruction, always ask your healthcare professional. [place] [address] any warranty or liability for your use of this information.

## 2017-09-15 NOTE — PROGRESS NOTES
1. Have you been to the ER, urgent care clinic since your last visit? Hospitalized since your last visit? No.    2. Have you seen or consulted any other health care providers outside of the [address] [place] [address] since your last visit? Include any pap smears or colon screening. No.  PHQ over the last two weeks 9/15/2017   Little interest or pleasure in doing things -   Feeling down, depressed or hopeless Not at all   Total Score PHQ 2 -   INR verbal order DR. Quiroz/KEVAN Garcia.

## 2017-09-15 NOTE — PROGRESS NOTES
Janel Iraheta is a 46 y.o. male and presents with New Patient  . Subjective:  Previous dr. Timothy Pineda patient transferring care    1)Recurrent DVT/PE-pt is on warfarin lifelong preventive tx. Pt was started on eliquis by Dr. Timothy Pineda in August. Pt said that when he went to the pharmacy, they never got the rx. Last INR 6 weeks ago subtherapeutic. INR today=2.9 on 6mg warfarin. 2)Seizure d/o-pt has been having frequent seizures as per caregiver. They request a referral to see the neurologist here @ [facility]. Pt does not currently have a neurologist. Pt relays compliance w his regimen of depakote, keppra and zonegran    3)HTN-stable on current regimen. Pt has no complaints.  Taking his medication daily      Review of Systems  Constitutional: negative for fevers, chills, anorexia and weight loss  Respiratory:  negative for cough, hemoptysis, dyspnea,wheezing  CV:   negative for chest pain, palpitations, lower extremity edema  GI:   negative for nausea, vomiting, diarrhea, abdominal pain,melena  Integument:  negative for rash and pruritus  Hematologic:  negative for easy bruising and gum/nose bleeding  Musculoskel: negative for myalgias, arthralgias, back pain, muscle weakness, joint pain  Neurological:  negative for headaches, dizziness, vertigo, memory problems and gait   Behavl/Psych: negative for feelings of anxiety, depression, mood changes    Past Medical History:   Diagnosis Date    DVT (deep venous thrombosis) ([facility] [address] 7/2/2013    Presence of IVC filter     Pulmonary embolism ([facility] [address] 7/4/2013    Seizures ([facility] [address]     monthly seizures- sometimes several per month- managed by PCP at this time, per caregiver    Trauma     hit in head wiht board playing dominos      Past Surgical History:   Procedure Laterality Date    HX CRANIOTOMY  2006    Trauma repair    VASCULAR SURGERY PROCEDURE UNLIST  2013    IVC filter     Social History     Social History    Marital status: SINGLE     Spouse name: N/A    Number of children: N/A    Years of education: N/A     Social History Main Topics    Smoking status: Never Smoker    Smokeless tobacco: Never Used    Alcohol use 5.0 oz/week     10 Cans of beer per week    Drug use: No    Sexual activity: Yes     Partners: Female     Other Topics Concern     Service No    Blood Transfusions No    Caffeine Concern No    Occupational Exposure No    Hobby Hazards No    Sleep Concern No    Stress Concern No    Weight Concern No    Special Diet No    Back Care No    Exercise No    Bike Helmet No    Seat Belt No    Self-Exams No     Social History Narrative    ** Merged History Encounter **         ** Data from: 13 Enc Dept: [facility]         ** Data from: 7/3/13 Enc Dept: [facility] [place]. 10th Grade education from [employer] in [employer]" program.    Has a total of 8 brothers and sisters. Single with mariorijas and 4 children. Hobbies: fishing. Family History   Problem Relation Age of Onset    Alcohol abuse Father 21      of hepatic cirrhosis in his 45s. Was drinking 4-5 bottles of wine daily.  Hypertension Mother 61    COPD Mother     Sleep Apnea Mother      Current Outpatient Prescriptions   Medication Sig Dispense Refill    divalproex DR (DEPAKOTE) 500 mg tablet TAKE 1 TAB IN MORNING AND EVERY EVENING 60 Tab 5    zonisamide (ZONEGRAN) 100 mg capsule TAKE 2 CAPSULES BY MOUTH TWICE A  Cap 4    lisinopril (PRINIVIL, ZESTRIL) 10 mg tablet TAKE 1 TAB BY MOUTH DAILY. 30 Tab 5    apixaban (ELIQUIS) 5 mg tablet Take 1 Tab by mouth two (2) times a day.  Indications: DEEP VEIN THROMBOSIS PREVENTION 60 Tab 5    divalproex DR (DEPAKOTE) 250 mg tablet TAKE ONE TAB BY MOUTH EVERY NIGHT AT BEDTIME 30 Tab 4    levETIRAcetam 1,000 mg tablet TAKE ONE TAB BY MOUTH EVERY MORNING AND A 1/2 TAB EVERY EVENING 90 Tab 3    warfarin (COUMADIN) 1 mg tablet As directed 100 Tab 11    warfarin (COUMADIN) 5 mg tablet Use 5 mg 60 Tab 11    pramoxine-hydrocortisone (PROTOFOAM-HC) 2.5-1 % topical cream Apply  to affected area three (3) times daily. 10 g 0    tadalafil (CIALIS) 5 mg tablet Take 1 Tab by mouth as needed. 30 Tab 0    oxyCODONE-acetaminophen (PERCOCET) 5-325 mg per tablet Take 1 Tab by mouth two (2) times daily as needed for Pain. Max Daily Amount: 2 Tabs. 60 Tab 0     No Known Allergies    Objective:  Visit Vitals    /87 (BP 1 Location: Left arm, BP Patient Position: Sitting)    Pulse 90    Temp 98.2 °F (36.8 °C) (Oral)    Resp 18    Ht 5' 6\" (1.676 m)    Wt 162 lb 3.2 oz (73.6 kg)    SpO2 99%    BMI 26.18 kg/m2     Physical Exam:   General appearance - alert, well appearing, and in no distress  Mental status - alert, oriented to person, place, and time  EYE-EOMI  EsMouth - poor dentition  Neck - supple, no significant adenopathy   Chest - clear to auscultation, no wheezes, rales or rhonchi, symmetric air entry   Heart - normal rate, regular rhythm, normal S1, S2, no murmurs, rubs, clicks or gallops   Abdomen - soft, nontender, nondistended, no masses or organomegaly  Ext-peripheral pulses normal, no pedal edema, no clubbing or cyanosis  Skin-Warm and dry. no hyperpigmentation, vitiligo, or suspicious lesions  Neuro -alert, oriented, normal speech, no focal findings or movement disorder noted        Results for orders placed or performed in visit on 09/15/17   AMB POC PT/INR   Result Value Ref Range    VALID INTERNAL CONTROL POC Yes     Prothrombin time (POC)  seconds    INR POC 2.9        Assessment/Plan:    ICD-10-CM ICD-9-CM    1. Deep vein thrombosis (DVT) of right lower extremity, unspecified chronicity, unspecified vein (HCC) I82.401 453.40 AMB POC PT/INR   2.  Seizures ([facility] [address] R56.9 780.39 REFERRAL TO NEUROLOGY   3. Essential hypertension I10 401.9      Orders Placed This Encounter    REFERRAL TO NEUROLOGY     Referral Priority:   Routine     Referral Type: Consultation     Referral Reason:   Specialty Services Required     Referral Location:   [facility]     Referred to Provider:   Etelvina Lynn MD    AMB POC PT/INR    divalproex DR (DEPAKOTE) 500 mg tablet     Sig: TAKE 1 TAB IN MORNING AND EVERY EVENING     Dispense:  60 Tab     Refill:  5    zonisamide (ZONEGRAN) 100 mg capsule     Sig: TAKE 2 CAPSULES BY MOUTH TWICE A DAY     Dispense:  120 Cap     Refill:  4    lisinopril (PRINIVIL, ZESTRIL) 10 mg tablet     Sig: TAKE 1 TAB BY MOUTH DAILY. Dispense:  30 Tab     Refill:  5    apixaban (ELIQUIS) 5 mg tablet     Sig: Take 1 Tab by mouth two (2) times a day. Indications: DEEP VEIN THROMBOSIS PREVENTION     Dispense:  60 Tab     Refill:  5     eliquis rx resent  Cont warfarin UNTIL YOU GET THE  F/u 2 weeks INR check IF YOU DO NOT GET ELIQUIS  Neuro referral generated  F/u w me in 3 mths  Pt declines flu vaccine  Patient Instructions   Warfarin (By mouth)   Warfarin (WAR-far-in)  Prevents and treats blood clots. May lower the risk of serious complications after a heart attack. This medicine is a blood thinner. Brand Name(s): Coumadin, Jantoven   There may be other brand names for this medicine. When This Medicine Should Not Be Used: This medicine is not right for everyone. Do not use it if you had an allergic reaction to warfarin, if you are pregnant, or if you have health problems that could cause bleeding. How to Use This Medicine:   Tablet  · Take your medicine as directed. Your dose may need to be changed several times to find what works best for you. · This medicine should come with a Medication Guide. Ask your pharmacist for a copy if you do not have one. · Missed dose: Take a dose as soon as you remember. If it is almost time for your next dose, wait until then and take a regular dose. Do not take extra medicine to make up for a missed dose.   · Store the medicine in a closed container at room temperature, away from heat, moisture, and direct light. Drugs and Foods to Avoid:   Ask your doctor or pharmacist before using any other medicine, including over-the-counter medicines, vitamins, and herbal products. · Many medicines and foods can affect how warfarin works and may affect the PT/INR test results. Tell your doctor before you start or stop any medicine, especially the following:   ¨ Ginkgo, echinacea, goldenseal, or Uli's wort  ¨ Another blood thinner, including apixaban, argatroban, bivalirudin, cilostazol, clopidogrel, dabigatran, desirudin, dipyridamole, heparin, lepirudin, prasugrel, rivaroxaban, ticlopidine  ¨ Medicine to treat depression or anxiety, including citalopram, desvenlafaxine, duloxetine, escitalopram, fluoxetine, fluvoxamine, milnacipran, paroxetine, sertraline, venlafaxine, vilazodone  ¨ Medicine to treat an infection  ¨ NSAID pain or arthritis medicine, including aspirin, celecoxib, diclofenac, diflunisal, fenoprofen, ibuprofen, indomethacin, ketoprofen, ketorolac, mefenamic acid, naproxen, oxaprozin, piroxicam, sulindac. Check labels for over-the-counter medicines to find out if they contain an NSAID. ¨ Steroid medicine, including dexamethasone, hydrocortisone, methylprednisolone, prednisolone, prednisone  · Warfarin works best if you eat about the same amount of vitamin K every day. Foods high in vitamin K include asparagus, broccoli, brussels sprouts, cabbage, green leafy vegetables, plums, rhubarb, and canola oil. Talk to your doctor before you make changes to your normal diet. · Do not eat grapefruit or drink grapefruit juice while you are using this medicine. Warnings While Using This Medicine:   · It is not safe to take this medicine during pregnancy. It could harm an unborn baby. Tell your doctor right away if you become pregnant. Use an effective form of birth control to keep from getting pregnant during treatment and for at least 1 month after your last dose.   · Tell your doctor if you are breastfeeding, or if you have kidney disease, liver disease, diabetes, heart disease, heart failure, high blood pressure, an infection, a stomach ulcer, or protein C deficiency. Also tell your doctor if you had recent surgery or an injury, or a history of stroke, anemia, severe bleeding or bruising, or problems caused by heparin use. · This medicine may cause the following problems:   ¨ Bleeding, which may be life-threatening  ¨ Gangrene (skin or tissue damage)  ¨ Calciphylaxis or calcium uremic arteriolopathy  ¨ Purple toes syndrome  · You must have a PT/INR blood test while you use this medicine to check how well your blood is clotting. Your doctor will use the test results to make sure the medicine is working properly. Keep all appointments for the PT/INR blood tests. · You may bleed or bruise more easily with warfarin. To prevent injury or cuts, do not play rough sports, be careful with sharp objects, and brush and floss your teeth gently. Avinash Irons your nose gently, and do not pick your nose. · Carry an ID card or wear a medical alert bracelet to let emergency caregivers know that you use warfarin. · Tell any doctor or dentist who treats you that you are using this medicine. You may need to stop using this medicine several days before you have surgery or medical tests. · Keep all medicine out of the reach of children. Never share your medicine with anyone.   Possible Side Effects While Using This Medicine:   Call your doctor right away if you notice any of these side effects:  · Allergic reaction: Itching or hives, swelling in your face or hands, swelling or tingling in your mouth or throat, chest tightness, trouble breathing  · Bleeding from your gums or nose, coughing up blood, heavy monthly periods  · Bleeding that does not stop, bruising, or weakness  · Dizziness, fainting, lightheadedness  · Pain, brown or black skin, or skin that is cool to the touch  · Purple toes or feet, or new pain in your leg, foot, or toes  · Purplish red, net-like, blotchy spots on the skin  · Red or dark brown urine, or red or black, tarry stools  · Vomiting blood or material that looks like coffee grounds  If you notice other side effects that you think are caused by this medicine, tell your doctor. Call your doctor for medical advice about side effects. You may report side effects to FDA at 8-709-CHU-4517  © 2017 [address] Information is for End User's use only and may not be sold, redistributed or otherwise used for commercial purposes. The above information is an  only. It is not intended as medical advice for individual conditions or treatments. Talk to your doctor, nurse or pharmacist before following any medical regimen to see if it is safe and effective for you. Consistent Vitamin K Diet: Care Instructions  Your Care Instructions  Your body needs vitamin K to clot blood and keep your bones strong. It's found in leafy green vegetables such as kale and spinach. If you take the blood thinner warfarin (Coumadin), you need to be careful about how much vitamin K you get. Vitamin K can keep your warfarin from working as it should. Most people who take warfarin can eat normally. The important thing is to get about the same amount of vitamin K each day. Don't suddenly start eating foods with a lot more or a lot less vitamin K. You can choose how much vitamin K you eat. For example, if you already eat a lot of leafy green vegetables, that's fine. Just keep it about the same amount each day. Follow-up care is a key part of your treatment and safety. Be sure to make and go to all appointments, and call your doctor if you are having problems. It's also a good idea to know your test results and keep a list of the medicines you take. How can you care for yourself at home? You don't need to stop eating food high in vitamin K. But you do need to know what foods contain vitamin K.  Then you can try to eat about the same amount of vitamin K each day. · You might limit foods that are high in vitamin K to about 1 serving a day. These foods have about 250 to 500 micrograms (mcg) of vitamin K in each serving. They include:  ¨ Cooked leafy green vegetables. Examples are kale, spinach, turnip greens, juan jose greens, Swiss chard, and mustard greens. One serving is ½ cup. · You might limit foods that are medium-high in vitamin K to about 3 servings a day. These foods have about 50 to 150 mcg of vitamin K in each serving. These include:  ¨ Cooked brussels sprouts, broccoli, cabbage, and asparagus. One serving is ½ cup. ¨ Raw leafy green vegetables. Examples are spinach, green leaf lettuce, pratima lettuce, and endive. One serving is 1 cup. · Vitamin K also is found in many multivitamins. You don't need to stop taking your multivitamin if it has vitamin K. But you do need to take it every day. · Check with your doctor before you start or stop taking any supplements or herbal products. Some of these may contain vitamin K. Where can you learn more? Go to http://nicholas-stella.info/. Enter O349 in the search box to learn more about \"Consistent Vitamin K Diet: Care Instructions. \"  Current as of: March 2, 2017  Content Version: 11.3  © 5352-4448 [employer]. Care instructions adapted under license by [employer] (which disclaims liability or warranty for this information). If you have questions about a medical condition or this instruction, always ask your healthcare professional. [place] [address] any warranty or liability for your use of this information. Follow-up Disposition:  Return in about 2 weeks (around 9/29/2017) for 2 weeks w nurse and 3 mths w me. I have reviewed with the patient details of the assessment and plan and all questions were answered. Relevent patient education was performed. The most recent lab findings were reviewed with the patient. An After Visit Summary was printed and given to the patient.

## 2017-10-02 ENCOUNTER — CLINICAL SUPPORT (OUTPATIENT)
Dept: INTERNAL MEDICINE CLINIC | Age: 52
End: 2017-10-02

## 2017-10-02 DIAGNOSIS — I82.401 DEEP VEIN THROMBOSIS (DVT) OF RIGHT LOWER EXTREMITY, UNSPECIFIED CHRONICITY, UNSPECIFIED VEIN (HCC): Primary | ICD-10-CM

## 2017-10-02 LAB
INR BLD: 2.1
PT POC: 24.8 SECONDS
VALID INTERNAL CONTROL?: YES

## 2017-10-02 NOTE — PROGRESS NOTES
INRO verbal order DR. Quiroz/KEVAN Parikh. Patient stated that he is taking coumadin 6 mg every day. INR is 2.1today. Patient instructed to return in one month for nurse visit  and continue 6 mg coumadin daily per DR. Colin Berry.

## 2017-12-28 ENCOUNTER — OFFICE VISIT (OUTPATIENT)
Dept: INTERNAL MEDICINE CLINIC | Age: 52
End: 2017-12-28

## 2017-12-28 VITALS
TEMPERATURE: 97.6 F | OXYGEN SATURATION: 97 % | SYSTOLIC BLOOD PRESSURE: 129 MMHG | HEART RATE: 85 BPM | WEIGHT: 163.9 LBS | BODY MASS INDEX: 26.34 KG/M2 | RESPIRATION RATE: 18 BRPM | DIASTOLIC BLOOD PRESSURE: 84 MMHG | HEIGHT: 66 IN

## 2017-12-28 DIAGNOSIS — Z79.01 ANTICOAGULANT LONG-TERM USE: ICD-10-CM

## 2017-12-28 DIAGNOSIS — M54.2 NECK PAIN: Primary | ICD-10-CM

## 2017-12-28 LAB
INR BLD: 1.1
PT POC: NORMAL SECONDS
VALID INTERNAL CONTROL?: YES

## 2017-12-28 RX ORDER — IBUPROFEN 600 MG/1
600 TABLET ORAL
Qty: 45 TAB | Refills: 0 | Status: SHIPPED | OUTPATIENT
Start: 2017-12-28 | End: 2018-06-13

## 2017-12-28 NOTE — PATIENT INSTRUCTIONS
Take 10mg nightly for TWO nights. Then take 6mg nightly. Come back in [facility] to recheck blood. Neck Pain: Care Instructions  Your Care Instructions    You can have neck pain anywhere from the bottom of your head to the top of your shoulders. It can spread to the upper back or arms. Injuries, painting a ceiling, sleeping with your neck twisted, staying in one position for too long, and many other activities can cause neck pain. Most neck pain gets better with home care. Your doctor may recommend medicine to relieve pain or relax your muscles. He or she may suggest exercise and physical therapy to increase flexibility and relieve stress. You may need to wear a special (cervical) collar to support your neck for a day or two. Follow-up care is a key part of your treatment and safety. Be sure to make and go to all appointments, and call your doctor if you are having problems. It's also a good idea to know your test results and keep a list of the medicines you take. How can you care for yourself at home? · Try using a heating pad on a low or medium setting for 15 to 20 minutes every 2 or 3 hours. Try a warm shower in place of one session with the heating pad. · You can also try an ice pack for 10 to 15 minutes every 2 to 3 hours. Put a thin cloth between the ice and your skin. · Take pain medicines exactly as directed. ¨ If the doctor gave you a prescription medicine for pain, take it as prescribed. ¨ If you are not taking a prescription pain medicine, ask your doctor if you can take an over-the-counter medicine. · If your doctor recommends a cervical collar, wear it exactly as directed. When should you call for help? Call your doctor now or seek immediate medical care if:  ? · You have new or worsening numbness in your arms, buttocks or legs. ? · You have new or worsening weakness in your arms or legs. (This could make it hard to stand up.)   ? · You lose control of your bladder or bowels. ? Watch closely for changes in your health, and be sure to contact your doctor if:  ? · Your neck pain is getting worse. ? · You are not getting better after 1 week. ? · You do not get better as expected. Where can you learn more? Go to http://nicholas-stella.info/. Enter [id number] in the search box to learn more about \"Neck Pain: Care Instructions. \"  Current as of: March 21, 2017  Content Version: 11.4  © 2435-2159 [employer]. Care instructions adapted under license by [employer] (which disclaims liability or warranty for this information). If you have questions about a medical condition or this instruction, always ask your healthcare professional. [address] any warranty or liability for your use of this information.

## 2017-12-28 NOTE — MR AVS SNAPSHOT
Visit Information Date & Time Provider Department Dept. Phone Encounter #  
 12/28/2017 11:00 AM Dane Segura, [address]  [id number] Follow-up Instructions Return in about 1 week (around 1/4/2018) for INR check. Upcoming Health Maintenance Date Due FOBT Q 1 YEAR AGE 50-75 7/17/2018 DTaP/Tdap/Td series (2 - Td) 4/15/2025 Allergies as of 12/28/2017  Review Complete On: 12/28/2017 By: Dane Segura MD  
 No Known Allergies Current Immunizations  Reviewed on 4/15/2015 Name Date Influenza Vaccine 11/25/2013 Influenza Vaccine Intradermal PF 11/2/2015, 10/6/2014 Tdap 4/15/2015, 6/9/2013 Not reviewed this visit You Were Diagnosed With   
  
 Codes Comments Neck pain    -  Primary ICD-10-CM: M54.2 ICD-9-CM: 723.1 Anticoagulant long-term use     ICD-10-CM: Z79.01 
ICD-9-CM: V58.61 Vitals BP Pulse Temp Resp Height(growth percentile) Weight(growth percentile) 129/84 (BP 1 Location: Left arm, BP Patient Position: Sitting) 85 97.6 °F (36.4 °C) (Oral) 18 5' 6\" (1.676 m) 163 lb 14.4 oz (74.3 kg) SpO2 BMI Smoking Status 97% 26.45 kg/m2 Never Smoker Vitals History BMI and BSA Data Body Mass Index Body Surface Area  
 26.45 kg/m 2 1.86 m 2 Preferred Pharmacy Pharmacy Name Phone [facility] [address] [place] [address] [phone] Your Updated Medication List  
  
   
This list is accurate as of: 12/28/17 11:51 AM.  Always use your most recent med list.  
  
  
  
  
 apixaban 5 mg tablet Commonly known as:  Matt Tilden Take 1 Tab by mouth two (2) times a day. Indications: DEEP VEIN THROMBOSIS PREVENTION  
  
 * divalproex  mg tablet Commonly known as:  DEPAKOTE  
TAKE ONE TAB BY MOUTH EVERY NIGHT AT BEDTIME  
  
 * divalproex  mg tablet Commonly known as:  DEPAKOTE  
 TAKE 1 TAB IN MORNING AND EVERY EVENING  
  
 ibuprofen 600 mg tablet Commonly known as:  MOTRIN Take 1 Tab by mouth every eight (8) hours as needed for Pain. Take w food  
  
 levETIRAcetam 1,000 mg tablet TAKE ONE TAB BY MOUTH EVERY MORNING AND A 1/2 TAB EVERY EVENING  
  
 lisinopril 10 mg tablet Commonly known as:  PRINIVIL, ZESTRIL  
TAKE 1 TAB BY MOUTH DAILY. oxyCODONE-acetaminophen 5-325 mg per tablet Commonly known as:  PERCOCET Take 1 Tab by mouth two (2) times daily as needed for Pain. Max Daily Amount: 2 Tabs. pramoxine-hydrocortisone 2.5-1 % topical cream  
Commonly known as:  PROTOFOAM-HC Apply  to affected area three (3) times daily. tadalafil 5 mg tablet Commonly known as:  CIALIS Take 1 Tab by mouth as needed. * warfarin 1 mg tablet Commonly known as:  COUMADIN As directed * warfarin 5 mg tablet Commonly known as:  COUMADIN Use 5 mg  
  
 zonisamide 100 mg capsule Commonly known as:  ZONEGRAN  
TAKE 2 CAPSULES BY MOUTH TWICE A DAY * Notice: This list has 4 medication(s) that are the same as other medications prescribed for you. Read the directions carefully, and ask your doctor or other care provider to review them with you. Prescriptions Sent to Pharmacy Refills  
 ibuprofen (MOTRIN) 600 mg tablet 0 Sig: Take 1 Tab by mouth every eight (8) hours as needed for Pain. Take w food Class: Normal  
 Pharmacy: [address] [place] [address] #: [phone] Route: Oral  
  
We Performed the Following AMB POC PT/INR [04344 CPT(R)] Follow-up Instructions Return in about 1 week (around 1/4/2018) for INR check. Patient Instructions Take 10mg nightly for TWO nights. Then take 6mg nightly. Come back in [address] [place] [address] to recheck blood. Neck Pain: Care Instructions Your Care Instructions You can have neck pain anywhere from the bottom of your head to the top of your shoulders. It can spread to the upper back or arms. Injuries, painting a ceiling, sleeping with your neck twisted, staying in one position for too long, and many other activities can cause neck pain. Most neck pain gets better with home care. Your doctor may recommend medicine to relieve pain or relax your muscles. He or she may suggest exercise and physical therapy to increase flexibility and relieve stress. You may need to wear a special (cervical) collar to support your neck for a day or two. Follow-up care is a key part of your treatment and safety. Be sure to make and go to all appointments, and call your doctor if you are having problems. It's also a good idea to know your test results and keep a list of the medicines you take. How can you care for yourself at home? · Try using a heating pad on a low or medium setting for 15 to 20 minutes every 2 or 3 hours. Try a warm shower in place of one session with the heating pad. · You can also try an ice pack for 10 to 15 minutes every 2 to 3 hours. Put a thin cloth between the ice and your skin. · Take pain medicines exactly as directed. ¨ If the doctor gave you a prescription medicine for pain, take it as prescribed. ¨ If you are not taking a prescription pain medicine, ask your doctor if you can take an over-the-counter medicine. · If your doctor recommends a cervical collar, wear it exactly as directed. When should you call for help? Call your doctor now or seek immediate medical care if: 
? · You have new or worsening numbness in your arms, buttocks or legs. ? · You have new or worsening weakness in your arms or legs. (This could make it hard to stand up.) ? · You lose control of your bladder or bowels. ? Watch closely for changes in your health, and be sure to contact your doctor if: 
? · Your neck pain is getting worse. ? · You are not getting better after 1 week. ? · You do not get better as expected. Where can you learn more? Go to http://nicholas-stella.info/. Enter [id number] in the search box to learn more about \"Neck Pain: Care Instructions. \" Current as of: March 21, 2017 Content Version: 11.4 © 4608-4770 [employer]. Care instructions adapted under license by [employer] (which disclaims liability or warranty for this information). If you have questions about a medical condition or this instruction, always ask your healthcare professional. [place] [address] any warranty or liability for your use of this information. Introducing [facility]! Juanita Jean introduces [employer] patient portal. Now you can access parts of your medical record, email your doctor's office, and request medication refills online. 1. In your internet browser, go to [email or web address] 2. Click on the First Time User? Click Here link in the Sign In box. You will see the New Member Sign Up page. 3. Enter your [employer] Access Code exactly as it appears below. You will not need to use this code after youve completed the sign-up process. If you do not sign up before the expiration date, you must request a new code. · [employer] Access Code: [email or web address] Expires: 3/28/2018 11:11 AM 
 
4. Enter the last four digits of your Social Security Number (xxxx) and Date of Birth (mm/dd/yyyy) as indicated and click Submit. You will be taken to the next sign-up page. 5. Create a [employer] ID. This will be your [employer] login ID and cannot be changed, so think of one that is secure and easy to remember. 6. Create a [employer] password. You can change your password at any time. 7. Enter your Password Reset Question and Answer. This can be used at a later time if you forget your password. 8. Enter your e-mail address.  You will receive e-mail notification when new information is available in [employer]. 9. Click Sign Up. You can now view and download portions of your medical record. 10. Click the Download Summary menu link to download a portable copy of your medical information. If you have questions, please visit the Frequently Asked Questions section of the [employer] website. Remember, [employer] is NOT to be used for urgent needs. For medical emergencies, dial 911. Now available from your iPhone and Android! Please provide this summary of care documentation to your next provider. Your primary care clinician is listed as Jesenia Yarbrough. If you have any questions after today's visit, please call [phone].
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
## 2018-03-02 ENCOUNTER — DOCUMENTATION ONLY (OUTPATIENT)
Dept: INTERNAL MEDICINE CLINIC | Age: 53
End: 2018-03-02

## 2018-04-09 ENCOUNTER — CLINICAL SUPPORT (OUTPATIENT)
Dept: INTERNAL MEDICINE CLINIC | Age: 53
End: 2018-04-09

## 2018-04-09 DIAGNOSIS — I82.401 DEEP VEIN THROMBOSIS (DVT) OF RIGHT LOWER EXTREMITY, UNSPECIFIED CHRONICITY, UNSPECIFIED VEIN (HCC): Primary | ICD-10-CM

## 2018-04-09 LAB
INR BLD: 1.1
PT POC: NORMAL SECONDS
VALID INTERNAL CONTROL?: YES

## 2018-04-09 NOTE — PROGRESS NOTES
Patient for nurse visit for INR check/INR today is 1.1today/.patient inform that he no longer need to come in for INR check because he is now taking Eliquis 5 mg twice a day and to make an appointment to see Melisa Turcios in one month per .

## 2018-04-16 RX ORDER — DIVALPROEX SODIUM 500 MG/1
TABLET, DELAYED RELEASE ORAL
Qty: 60 TAB | Refills: 5 | Status: SHIPPED | OUTPATIENT
Start: 2018-04-16 | End: 2018-06-13 | Stop reason: SDUPTHER

## 2018-06-13 ENCOUNTER — OFFICE VISIT (OUTPATIENT)
Dept: INTERNAL MEDICINE CLINIC | Age: 53
End: 2018-06-13

## 2018-06-13 VITALS
SYSTOLIC BLOOD PRESSURE: 119 MMHG | TEMPERATURE: 98.5 F | RESPIRATION RATE: 20 BRPM | HEIGHT: 66 IN | HEART RATE: 97 BPM | BODY MASS INDEX: 25.55 KG/M2 | DIASTOLIC BLOOD PRESSURE: 79 MMHG | OXYGEN SATURATION: 99 % | WEIGHT: 159 LBS

## 2018-06-13 DIAGNOSIS — Z11.3 SCREEN FOR STD (SEXUALLY TRANSMITTED DISEASE): ICD-10-CM

## 2018-06-13 DIAGNOSIS — I10 ESSENTIAL HYPERTENSION: ICD-10-CM

## 2018-06-13 DIAGNOSIS — Z79.01 ANTICOAGULANT LONG-TERM USE: ICD-10-CM

## 2018-06-13 DIAGNOSIS — I82.401 DEEP VEIN THROMBOSIS (DVT) OF RIGHT LOWER EXTREMITY, UNSPECIFIED CHRONICITY, UNSPECIFIED VEIN (HCC): ICD-10-CM

## 2018-06-13 DIAGNOSIS — Z12.11 COLON CANCER SCREENING: ICD-10-CM

## 2018-06-13 DIAGNOSIS — R56.9 SEIZURES (HCC): Primary | ICD-10-CM

## 2018-06-13 RX ORDER — LEVETIRACETAM 1000 MG/1
TABLET ORAL
Qty: 90 TAB | Refills: 3 | Status: SHIPPED | OUTPATIENT
Start: 2018-06-13 | End: 2018-06-28 | Stop reason: DRUGHIGH

## 2018-06-13 RX ORDER — DIVALPROEX SODIUM 500 MG/1
TABLET, DELAYED RELEASE ORAL
Qty: 120 TAB | Refills: 5 | Status: SHIPPED | OUTPATIENT
Start: 2018-06-13 | End: 2018-06-28 | Stop reason: DRUGHIGH

## 2018-06-13 RX ORDER — LISINOPRIL 10 MG/1
TABLET ORAL
Qty: 90 TAB | Refills: 2 | Status: SHIPPED | OUTPATIENT
Start: 2018-06-13 | End: 2019-05-09 | Stop reason: SDUPTHER

## 2018-06-13 NOTE — MR AVS SNAPSHOT
[address] [place] [address] [address] [address] 
[phone] Patient: Gibran Liu MRN: [id number] :1965 Visit Information Date & Time Provider Department Dept. Phone Encounter #  
 2018  8:45 AM Kemi Bowers, [address] [id number] Follow-up Instructions Return in about 3 months (around 2018) for seizure f/u. Upcoming Health Maintenance Date Due COLONOSCOPY 1983 Influenza Age 5 to Adult 2018 DTaP/Tdap/Td series (2 - Td) 4/15/2025 Allergies as of 2018  Review Complete On: 2018 By: Saul Mart LPN No Known Allergies Current Immunizations  Reviewed on 4/15/2015 Name Date Influenza Vaccine 2013 Influenza Vaccine Intradermal PF 2015, 10/6/2014 Tdap 4/15/2015, 2013 Not reviewed this visit You Were Diagnosed With   
  
 Codes Comments Seizures ([facility]    -  Primary ICD-10-CM: R56.9 ICD-9-CM: 780.39 Colon cancer screening     ICD-10-CM: Z12.11 ICD-9-CM: V76.51 Deep vein thrombosis (DVT) of right lower extremity, unspecified chronicity, unspecified vein (HCC)     ICD-10-CM: I82.401 ICD-9-CM: 453.40 Anticoagulant long-term use     ICD-10-CM: Z79.01 
ICD-9-CM: V58.61 Screen for STD (sexually transmitted disease)     ICD-10-CM: Z11.3 ICD-9-CM: V74.5 Essential hypertension     ICD-10-CM: I10 
ICD-9-CM: 401.9 Vitals BP Pulse Temp Resp Height(growth percentile) Weight(growth percentile) 119/79 (BP 1 Location: Right arm, BP Patient Position: Sitting) 97 98.5 °F (36.9 °C) (Oral) 20 5' 6\" (1.676 m) 159 lb (72.1 kg) SpO2 BMI Smoking Status 99% 25.66 kg/m2 Never Smoker Vitals History BMI and BSA Data Body Mass Index Body Surface Area  
 25.66 kg/m 2 1.83 m 2 Preferred Pharmacy Pharmacy Name Phone [facility] [address] [place] [address] [address] [phone] Your Updated Medication List  
  
   
This list is accurate as of 6/13/18  9:09 AM.  Always use your most recent med list.  
  
  
  
  
 apixaban 5 mg tablet Commonly known as:  Lazara Vick Take 1 Tab by mouth two (2) times a day. Indications: DEEP VEIN THROMBOSIS PREVENTION  
  
 * divalproex  mg tablet Commonly known as:  DEPAKOTE  
TAKE ONE TAB BY MOUTH EVERY NIGHT AT BEDTIME  
  
 * divalproex  mg tablet Commonly known as:  DEPAKOTE  
TAKE 1 TABLET IN MORNING AND EVERY EVENING  
  
 levETIRAcetam 1,000 mg tablet TAKE ONE TAB BY MOUTH EVERY MORNING AND A 1/2 TAB EVERY EVENING  
  
 lisinopril 10 mg tablet Commonly known as:  PRINIVIL, ZESTRIL  
TAKE 1 TABLET EVERY DAY  
  
 tadalafil 5 mg tablet Commonly known as:  CIALIS Take 1 Tab by mouth as needed. zonisamide 100 mg capsule Commonly known as:  ZONEGRAN  
TAKE 2 CAPSULES BY MOUTH TWICE A DAY * Notice: This list has 2 medication(s) that are the same as other medications prescribed for you. Read the directions carefully, and ask your doctor or other care provider to review them with you. Prescriptions Sent to Pharmacy Refills  
 divalproex DR (DEPAKOTE) 500 mg tablet 5 Sig: TAKE 1 TABLET IN MORNING AND EVERY EVENING Class: Normal  
 Pharmacy: [address] [place] [address] Ph #: [phone]  
 levETIRAcetam 1,000 mg tablet 3 Sig: TAKE ONE TAB BY MOUTH EVERY MORNING AND A 1/2 TAB EVERY EVENING Class: Normal  
 Pharmacy: [address] [place] [address] Ph #: [phone]  
 apixaban (ELIQUIS) 5 mg tablet 5 Sig: Take 1 Tab by mouth two (2) times a day. Indications: DEEP VEIN THROMBOSIS PREVENTION Class: Normal  
 Pharmacy: [address] [place] [address] Ph #: [phone]  Route: Oral  
 lisinopril (PRINIVIL, ZESTRIL) 10 mg tablet 2 Sig: TAKE 1 TABLET EVERY DAY Class: Normal  
 Pharmacy: [facility], [address] [place] [address] #: [phone] We Performed the Following CBC W/O DIFF [62782 CPT(R)] CHLAMYDIA/GC PCR [11311 CPT(R)] HEPATITIS C AB [07104 CPT(R)] HIV 1/2 AG/AB, 4TH GENERATION,W RFLX CONFIRM [id number] CPT(R)] LIPID PANEL [86081 CPT(R)] METABOLIC PANEL, COMPREHENSIVE [42088 CPT(R)] REFERRAL TO GASTROENTEROLOGY [QAJ23 Custom] Comments:  
 Please evaluate patient for colon ca screen REFERRAL TO NEUROLOGY [MYI24 Custom] T PALLIDUM AB, BY FTA-ABS [33886 CPT(R)] TSH 3RD GENERATION [64533 CPT(R)] Follow-up Instructions Return in about 3 months (around 9/13/2018) for seizure f/u. Referral Information Referral ID Referred By Referred To  
  
 [id number] JAKE, [address] MD Renato   
   [address] [place] [address] Phone: [phone] Fax: [phone] Visits Status Start Date End Date 1 New Request 6/13/18 6/13/19 If your referral has a status of pending review or denied, additional information will be sent to support the outcome of this decision. Referral ID Referred By Referred To  
 [id number] Melisa Jules MD  
   [address] [address] Maddieparag Reyes, [address] Phone: [phone] Fax: [phone] Visits Status Start Date End Date 1 New Request 6/13/18 6/13/19 If your referral has a status of pending review or denied, additional information will be sent to support the outcome of this decision. Introducing [facility]! [employer] introduces [employer] patient portal. Now you can access parts of your medical record, email your doctor's office, and request medication refills online. 1. In your internet browser, go to [email or web address] 2. Click on the First Time User? Click Here link in the Sign In box. You will see the New Member Sign Up page. 3. Enter your Dolphin Access Code exactly as it appears below. You will not need to use this code after youve completed the sign-up process. If you do not sign up before the expiration date, you must request a new code. · Dolphin Access Code: [email or web address] Expires: 9/11/2018  8:37 AM 
 
4. Enter the last four digits of your Social Security Number (xxxx) and Date of Birth (mm/dd/yyyy) as indicated and click Submit. You will be taken to the next sign-up page. 5. Create a Dolphin ID. This will be your Dolphin login ID and cannot be changed, so think of one that is secure and easy to remember. 6. Create a Dolphin password. You can change your password at any time. 7. Enter your Password Reset Question and Answer. This can be used at a later time if you forget your password. 8. Enter your e-mail address. You will receive e-mail notification when new information is available in [address] 9. Click Sign Up. You can now view and download portions of your medical record. 10. Click the Download Summary menu link to download a portable copy of your medical information. If you have questions, please visit the Frequently Asked Questions section of the Dolphin website. Remember, [employer]olphin is NOT to be used for urgent needs. For medical emergencies, dial 911. Now available from your iPhone and Android! Please provide this summary of care documentation to your next provider. Your primary care clinician is listed as Kingston Manuel. If you have any questions after today's visit, please call [phone].

## 2018-06-13 NOTE — PROGRESS NOTES
Laura Leos is a 46 y.o. male and presents with Hypertension; Follow-up; and Medication Refill  . 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 Pt has no complaints.  Taking his medication daily   -  BP Readings from Last 3 Encounters:   06/13/18 119/79   12/28/17 129/84   09/15/17 121/87           Review of Systems  Constitutional: negative for fevers, chills, anorexia and weight loss  Respiratory:  negative for cough, hemoptysis, dyspnea,wheezing  CV:   negative for chest pain, palpitations, lower extremity edema  GI:   negative for nausea, vomiting, diarrhea, abdominal pain,melena  Integument:  negative for rash and pruritus  Hematologic:  negative for easy bruising and gum/nose bleeding  Musculoskel: negative for myalgias, arthralgias, back pain, muscle weakness, joint pain  Neurological:  negative for headaches, dizziness, vertigo, memory problems and gait   Behavl/Psych: negative for feelings of anxiety, depression, mood changes    Past Medical History:   Diagnosis Date    DVT (deep venous thrombosis) ([facility] 7/2/2013    Presence of IVC filter     Pulmonary embolism ([facility] 7/4/2013    Seizures ([facility]     monthly seizures- sometimes several per month- managed by PCP at this time, per caregiver    Trauma     hit in head wiht board playing [employer]      Past Surgical History:   Procedure Laterality Date    HX CRANIOTOMY  2006    Trauma repair    VASCULAR SURGERY PROCEDURE UNLIST  2013    IVC filter     Social History     Social History    Marital status: SINGLE     Spouse name: N/A    Number of children: N/A    Years of education: N/A     Social History Main Topics    Smoking status: Never Smoker    Smokeless tobacco: Never Used    Alcohol use 5.0 oz/week     10 Cans of beer per week    Drug use: No    Sexual activity: Yes     Partners: Female     Other Topics Concern     Service No    Blood Transfusions No    Caffeine Concern No    Occupational Exposure No    Hobby Hazards No    Sleep Concern No    Stress Concern No    Weight Concern No    Special Diet No    Back Care No    Exercise No    Bike Helmet No    Seat Belt No    Self-Exams No     Social History Narrative    ** Merged History Encounter **         ** Data from: 13 [facility] Dept: [facility]         ** Data from: 7/3/13 [facility] Dept: [facility] [place] native. 10th Grade education from [employer] in [employer]" program.    Has a total of 8 brothers and sisters. Single with hillary and 4 children. Hobbies: fishing. Family History   Problem Relation Age of Onset    Alcohol abuse Father 21      of hepatic cirrhosis in his 45s. Was drinking 4-5 bottles of wine daily.  Hypertension Mother 61    COPD Mother     Sleep Apnea Mother      Current Outpatient Prescriptions   Medication Sig Dispense Refill    divalproex DR (DEPAKOTE) 500 mg tablet TAKE 1 TABLET IN MORNING AND EVERY EVENING 120 Tab 5    levETIRAcetam 1,000 mg tablet TAKE ONE TAB BY MOUTH EVERY MORNING AND A 1/2 TAB EVERY EVENING 90 Tab 3    apixaban (ELIQUIS) 5 mg tablet Take 1 Tab by mouth two (2) times a day. Indications: DEEP VEIN THROMBOSIS PREVENTION 60 Tab 5    lisinopril (PRINIVIL, ZESTRIL) 10 mg tablet TAKE 1 TABLET EVERY DAY 90 Tab 2    divalproex DR (DEPAKOTE) 250 mg tablet TAKE ONE TAB BY MOUTH EVERY NIGHT AT BEDTIME 30 Tab 1    zonisamide (ZONEGRAN) 100 mg capsule TAKE 2 CAPSULES BY MOUTH TWICE A  Cap 4    tadalafil (CIALIS) 5 mg tablet Take 1 Tab by mouth as needed.  30 Tab 0     No Known Allergies    Objective:  Visit Vitals    /79 (BP 1 Location: Right arm, BP Patient Position: Sitting)    Pulse 97    Temp 98.5 °F (36.9 °C) (Oral)  Resp 20    Ht 5' 6\" (1.676 m)    Wt 159 lb (72.1 kg)    SpO2 99%    BMI 25.66 kg/m2     Physical Exam:   General appearance - alert, well appearing, and in no distress. Pleasant  Mental status - alert, oriented to person, place, and time  EYE-EOMI  EsMouth - poor dentition  Neck - supple, no significant adenopathy   Chest - clear to auscultation, no wheezes, rales or rhonchi, symmetric air entry   Heart - normal rate, regular rhythm, normal S1, S2  Abdomen - soft, nontender, nondistended, no masses or organomegaly  Ext-peripheral pulses normal, no pedal edema, no clubbing or cyanosis  Skin-Warm and dry. no hyperpigmentation, vitiligo, or suspicious lesions  Neuro -alert, oriented, normal speech, no focal findings or movement disorder noted        Results for orders placed or performed in visit on 04/09/18   AMB POC PT/INR   Result Value Ref Range    VALID INTERNAL CONTROL POC Yes     Prothrombin time (POC)  seconds    INR POC 1.1        Assessment/Plan:    ICD-10-CM ICD-9-CM    1. Seizures (HCC) R56.9 780.39 REFERRAL TO NEUROLOGY      LIPID PANEL      METABOLIC PANEL, COMPREHENSIVE      CBC W/O DIFF      TSH 3RD GENERATION      divalproex DR (DEPAKOTE) 500 mg tablet      levETIRAcetam 1,000 mg tablet   2. Essential hypertension I10 401.9 lisinopril (PRINIVIL, ZESTRIL) 10 mg tablet   3. Deep vein thrombosis (DVT) of right lower extremity, unspecified chronicity, unspecified vein (HCC) I82.401 453.40 apixaban (ELIQUIS) 5 mg tablet   4. Anticoagulant long-term use Z79.01 V58.61 apixaban (ELIQUIS) 5 mg tablet   5.  Colon cancer screening Z12.11 V76.51 REFERRAL TO GASTROENTEROLOGY   6. Screen for STD (sexually transmitted disease) Z11.3 V74.5 HIV 1/2 AG/AB, 4TH GENERATION,W RFLX CONFIRM      HEPATITIS C AB      CHLAMYDIA/GC PCR      T PALLIDUM AB, BY FTA-ABS     Orders Placed This Encounter    LIPID PANEL    METABOLIC PANEL, COMPREHENSIVE    CBC W/O DIFF    TSH 3RD GENERATION    HIV 1/2 AG/AB, 4TH GENERATION,W RFLX CONFIRM    HEPATITIS C AB    CHLAMYDIA/GC PCR     Order Specific Question:   Sample source     Answer:   Urine [258]     Order Specific Question:   Specimen source     Answer:   Urine [258]    T PALLIDUM AB, BY FTA-ABS    [facility] ref [facility]     Referral Priority:   Routine     Referral Type:   Consultation     Referral Reason:   Specialty Services Required     Referred to Provider:   Celi Reardon MD    REFERRAL TO GASTROENTEROLOGY     Referral Priority:   Routine     Referral Type:   Consultation     Referral Reason:   Specialty Services Required     Referred to Provider:   Shi Ruiz MD    divalproex DR (DEPAKOTE) 500 mg tablet     Sig: TAKE 1 TABLET IN MORNING AND EVERY EVENING     Dispense:  120 Tab     Refill:  5    levETIRAcetam 1,000 mg tablet     Sig: TAKE ONE TAB BY MOUTH EVERY MORNING AND A 1/2 TAB EVERY EVENING     Dispense:  90 Tab     Refill:  3    apixaban (ELIQUIS) 5 mg tablet     Sig: Take 1 Tab by mouth two (2) times a day. Indications: DEEP VEIN THROMBOSIS PREVENTION     Dispense:  60 Tab     Refill:  5    lisinopril (PRINIVIL, ZESTRIL) 10 mg tablet     Sig: TAKE 1 TABLET EVERY DAY     Dispense:  90 Tab     Refill:  2     1. Seizures ([address]  Cont current regimen  - [facility] Neurology ref [facility]  - LIPID PANEL  - METABOLIC PANEL, COMPREHENSIVE  - CBC W/O DIFF  - TSH 3RD GENERATION  - divalproex DR (DEPAKOTE) 500 mg tablet; TAKE 1 TABLET IN MORNING AND EVERY EVENING  Dispense: 120 Tab; Refill: 5  - levETIRAcetam 1,000 mg tablet; TAKE ONE TAB BY MOUTH EVERY MORNING AND A 1/2 TAB EVERY EVENING  Dispense: 90 Tab; Refill: 3    2. Essential hypertension  Controlled on current regimen  - lisinopril (PRINIVIL, ZESTRIL) 10 mg tablet; TAKE 1 TABLET EVERY DAY  Dispense: 90 Tab; Refill: 2    3. Deep vein thrombosis (DVT) of right lower extremity, unspecified chronicity, unspecified vein (HCC)  Cont current regimen  - apixaban (ELIQUIS) 5 mg tablet;  Take 1 Tab by mouth two (2) times a day. Indications: DEEP VEIN THROMBOSIS PREVENTION  Dispense: 60 Tab; Refill: 5    4. Anticoagulant long-term use  Noted  - apixaban (ELIQUIS) 5 mg tablet; Take 1 Tab by mouth two (2) times a day. Indications: DEEP VEIN THROMBOSIS PREVENTION  Dispense: 60 Tab; Refill: 5    5. Colon cancer screening    - REFERRAL TO GASTROENTEROLOGY    6. Screen for STD (sexually transmitted disease)    - HIV 1/2 AG/AB, 4TH GENERATION,W RFLX CONFIRM  - HEPATITIS C AB  - CHLAMYDIA/GC PCR  - T PALLIDUM AB, BY FTA-ABS    There are no Patient Instructions on file for this visit. Follow-up Disposition:  Return in about 3 months (around 9/13/2018) for seizure f/u. I have reviewed with the patient details of the assessment and plan and all questions were answered. Relevent patient education was performed. The most recent lab findings were reviewed with the patient. An After Visit Summary was printed and given to the patient.

## 2018-06-13 NOTE — PROGRESS NOTES
1. Have you been to the ER, urgent care clinic since your last visit? Hospitalized since your last visit? No    2. Have you seen or consulted any other health care providers outside of the [address] [place] [address] since your last visit? Include any pap smears or colon screening.  No.  PHQ over the last two weeks 6/13/2018   Little interest or pleasure in doing things Not at all   Feeling down, depressed or hopeless Not at all   Total Score PHQ 2 0

## 2018-06-22 LAB
ALBUMIN SERPL-MCNC: 4 G/DL (ref 3.5–5.5)
ALBUMIN/GLOB SERPL: 1.4 {RATIO} (ref 1.2–2.2)
ALP SERPL-CCNC: 50 IU/L (ref 39–117)
ALT SERPL-CCNC: 18 IU/L (ref 0–44)
AST SERPL-CCNC: 24 IU/L (ref 0–40)
BILIRUB SERPL-MCNC: 0.6 MG/DL (ref 0–1.2)
BUN SERPL-MCNC: 15 MG/DL (ref 6–24)
BUN/CREAT SERPL: 17 (ref 9–20)
C TRACH RRNA SPEC QL NAA+PROBE: NEGATIVE
CALCIUM SERPL-MCNC: 9 MG/DL (ref 8.7–10.2)
CHLORIDE SERPL-SCNC: 102 MMOL/L (ref 96–106)
CHOLEST SERPL-MCNC: 241 MG/DL (ref 100–199)
CO2 SERPL-SCNC: 22 MMOL/L (ref 20–29)
CREAT SERPL-MCNC: 0.87 MG/DL (ref 0.76–1.27)
ERYTHROCYTE [DISTWIDTH] IN BLOOD BY AUTOMATED COUNT: 14.6 % (ref 12.3–15.4)
GFR SERPLBLD CREATININE-BSD FMLA CKD-EPI: 115 ML/MIN/1.73
GFR SERPLBLD CREATININE-BSD FMLA CKD-EPI: 99 ML/MIN/1.73
GLOBULIN SER CALC-MCNC: 2.8 G/DL (ref 1.5–4.5)
GLUCOSE SERPL-MCNC: 94 MG/DL (ref 65–99)
HCT VFR BLD AUTO: 43.6 % (ref 37.5–51)
HCV AB S/CO SERPL IA: <0.1 S/CO RATIO (ref 0–0.9)
HDLC SERPL-MCNC: 54 MG/DL
HGB BLD-MCNC: 15.1 G/DL (ref 13–17.7)
HIV 1+2 AB+HIV1 P24 AG SERPL QL IA: NON REACTIVE
INTERPRETATION, 910389: NORMAL
LDLC SERPL CALC-MCNC: 107 MG/DL (ref 0–99)
MCH RBC QN AUTO: 32.2 PG (ref 26.6–33)
MCHC RBC AUTO-ENTMCNC: 34.6 G/DL (ref 31.5–35.7)
MCV RBC AUTO: 93 FL (ref 79–97)
N GONORRHOEA RRNA SPEC QL NAA+PROBE: NEGATIVE
PLATELET # BLD AUTO: 298 X10E3/UL (ref 150–379)
POTASSIUM SERPL-SCNC: 4 MMOL/L (ref 3.5–5.2)
PROT SERPL-MCNC: 6.8 G/DL (ref 6–8.5)
RBC # BLD AUTO: 4.69 X10E6/UL (ref 4.14–5.8)
SODIUM SERPL-SCNC: 137 MMOL/L (ref 134–144)
T PALLIDUM AB SER QL IF: NON REACTIVE
TRIGL SERPL-MCNC: 398 MG/DL (ref 0–149)
TSH SERPL DL<=0.005 MIU/L-ACNC: 1.74 UIU/ML (ref 0.45–4.5)
VLDLC SERPL CALC-MCNC: 80 MG/DL (ref 5–40)
WBC # BLD AUTO: 8 X10E3/UL (ref 3.4–10.8)

## 2018-06-26 PROBLEM — E78.1 HYPERTRIGLYCERIDEMIA: Status: ACTIVE | Noted: 2018-06-26

## 2018-06-28 ENCOUNTER — OFFICE VISIT (OUTPATIENT)
Dept: NEUROLOGY | Age: 53
End: 2018-06-28

## 2018-06-28 VITALS
OXYGEN SATURATION: 96 % | HEIGHT: 66 IN | TEMPERATURE: 98.5 F | BODY MASS INDEX: 25.04 KG/M2 | WEIGHT: 155.8 LBS | SYSTOLIC BLOOD PRESSURE: 158 MMHG | DIASTOLIC BLOOD PRESSURE: 96 MMHG | HEART RATE: 101 BPM | RESPIRATION RATE: 14 BRPM

## 2018-06-28 DIAGNOSIS — O22.30 DVT (DEEP VEIN THROMBOSIS) IN PREGNANCY: ICD-10-CM

## 2018-06-28 DIAGNOSIS — G40.209 PARTIAL SYMPTOMATIC EPILEPSY WITH COMPLEX PARTIAL SEIZURES, NOT INTRACTABLE, WITHOUT STATUS EPILEPTICUS (HCC): ICD-10-CM

## 2018-06-28 DIAGNOSIS — G40.209 PARTIAL SYMPTOMATIC EPILEPSY WITH COMPLEX PARTIAL SEIZURES, NOT INTRACTABLE, WITHOUT STATUS EPILEPTICUS (HCC): Primary | ICD-10-CM

## 2018-06-28 DIAGNOSIS — F33.1 MODERATE EPISODE OF RECURRENT MAJOR DEPRESSIVE DISORDER (HCC): ICD-10-CM

## 2018-06-28 DIAGNOSIS — G40.409 EPILEPSY WITH GTCS (GENERALIZED TONIC CLONIC SEIZURES) ON AWAKENING (HCC): ICD-10-CM

## 2018-06-28 RX ORDER — DIVALPROEX SODIUM 500 MG/1
500 TABLET, DELAYED RELEASE ORAL 3 TIMES DAILY
Qty: 90 TAB | Refills: 1 | Status: SHIPPED | OUTPATIENT
Start: 2018-06-28 | End: 2018-08-30 | Stop reason: SDUPTHER

## 2018-06-28 RX ORDER — LEVETIRACETAM 1000 MG/1
1000 TABLET ORAL 2 TIMES DAILY
Qty: 60 TAB | Refills: 1 | Status: SHIPPED | OUTPATIENT
Start: 2018-06-28 | End: 2018-09-13 | Stop reason: SDUPTHER

## 2018-06-28 NOTE — MR AVS SNAPSHOT
[address] [facility] 
 
 
 [place] [address] 
[phone] Patient: Matt Pedroza MRN: [id number] :1965 Visit Information Date & Time Provider Department Dept. Phone Encounter #  
 2018  8:00 AM MD Clarence Yano [facility] at [facility] [phone] [id number] Follow-up Instructions Return in about 6 weeks (around 2018). Your Appointments 2018  8:45 AM  
ROUTINE CARE with Gisele Duke MD  
[address]) Appt Note: 3 month follow up on seizures [place] [address] [facility] [id number]  
[phone]  
  
   
 [place] [address] Upcoming Health Maintenance Date Due COLONOSCOPY 1983 Influenza Age 5 to Adult 2018 DTaP/Tdap/Td series (2 - Td) 4/15/2025 Allergies as of 2018  Review Complete On: 2018 By: Muna Iraheta MD  
 No Known Allergies Current Immunizations  Reviewed on 4/15/2015 Name Date Influenza Vaccine 2013 Influenza Vaccine Intradermal PF 2015, 10/6/2014 Tdap 4/15/2015, 2013 Not reviewed this visit You Were Diagnosed With   
  
 Codes Comments Partial symptomatic epilepsy with complex partial seizures, not intractable, without status epilepticus ([facility]    -  Primary ICD-10-CM: B23.839 ICD-9-CM: 345.40 Epilepsy with GTCS (generalized tonic clonic seizures) on awakening ([facility]     ICD-10-CM: G40.409 ICD-9-CM: 345.10 DVT (deep vein thrombosis) in pregnancy ([facility]     ICD-10-CM: O22.30, I82.409 ICD-9-CM: 671.30 Moderate episode of recurrent major depressive disorder (HCC)     ICD-10-CM: F33.1 ICD-9-CM: 296.32 Vitals BP Pulse Temp Resp Height(growth percentile)  (!) 158/96 (BP 1 Location: Left arm, BP Patient Position: Sitting) (!) 101 98.5 °F (36.9 °C) (Temporal) 14 5' 6\" (1.676 m) Weight(growth percentile) SpO2 BMI Smoking Status 155 lb 12.8 oz (70.7 kg) 96% 25.15 kg/m2 Never Smoker BMI and BSA Data Body Mass Index Body Surface Area  
 25.15 kg/m 2 1.81 m 2 Preferred Pharmacy Pharmacy Name Phone [facility] [address] [place] [address] [phone] Your Updated Medication List  
  
   
This list is accurate as of 6/28/18  9:35 AM.  Always use your most recent med list.  
  
  
  
  
 apixaban 5 mg tablet Commonly known as:  Chuck Brill Take 1 Tab by mouth two (2) times a day. Indications: DEEP VEIN THROMBOSIS PREVENTION  
  
 divalproex  mg tablet Commonly known as:  DEPAKOTE Take 1 Tab by mouth three (3) times daily. Take I tab in the morning, 2 tabs at night  
  
 levETIRAcetam 1,000 mg tablet Commonly known as:  KEPPRA Take 1 Tab by mouth two (2) times a day. lisinopril 10 mg tablet Commonly known as:  PRINIVIL, ZESTRIL  
TAKE 1 TABLET EVERY DAY  
  
 tadalafil 5 mg tablet Commonly known as:  CIALIS Take 1 Tab by mouth as needed. zonisamide 100 mg capsule Commonly known as:  ZONEGRAN  
TAKE 2 CAPSULES BY MOUTH TWICE A DAY Prescriptions Sent to Pharmacy Refills  
 divalproex DR (DEPAKOTE) 500 mg tablet 1 Sig: Take 1 Tab by mouth three (3) times daily. Take I tab in the morning, 2 tabs at night Class: Normal  
 Pharmacy: [address] [place] [address] Ph #: [phone] Route: Oral  
 levETIRAcetam (KEPPRA) 1,000 mg tablet 1 Sig: Take 1 Tab by mouth two (2) times a day. Class: Normal  
 Pharmacy: [address] [place] [address] Ph #: [phone] Route: Oral  
  
We Performed the Following ALDOLASE E0980654 CPT(R)] NITESH, DIRECT, W/REFLEX C0041069 CPT(R)] CK [id number] CPT(R)] PROLACTIN [49522 CPT(R)] PROTEIN S PANEL C1491283 CPT(R)] RHEUMATOID FACTOR, QL X0664006 CPT(R)] RPR [69937 CPT(R)] TSH 3RD GENERATION [11205 CPT(R)] VITAMIN B12 [id number] CPT(R)] VITAMIN D, 25 HYDROXY D2267187 CPT(R)] Follow-up Instructions Return in about 6 weeks (around 8/9/2018). To-Do List   
 06/28/2018 Neurology:  EEG   
  
 06/28/2018 Lab:  PROTEIN C DEFICIENCY Patient Instructions All test results will be discussed at the next appointment. Take Keppra 500 mg 1 tablet in the morning and 2 tablets at night. Electroencephalogram (EEG): About This Test 
What is it? An electroencephalogram (EEG) lets a doctor see the electrical activity of your brain. You will have small pads or patches attached to different places on your head. These are called electrodes. Wires connect the electrodes to a computer. The computer records the activity of the brain. This looks like wavy lines on the computer screen or on paper. Why is this test done? The test is often used to diagnose epilepsy. It helps a doctor know what types of seizures are happening. An EEG can also check brain activity in people with sleep disorders. It can also help a doctor know why a person passed out (lost consciousness). How can you prepare for the test? 
· Tell your doctor if you are taking any medicines. Your doctor may ask you to stop taking certain medicines before the test. These include sedatives and tranquilizers, muscle relaxants, sleeping aids, and seizure medicines. · Do not eat or drink anything with caffeine in it for 12 hours before the test. This includes cola, energy drinks, and chocolate. · Shampoo your hair and rinse with clear water the evening before or the morning of the test. Do not put any hair conditioner or oil on after you wash your hair. · Your doctor may ask you not to sleep the night before the test or to sleep for only about 4 or 5 hours.  This is because some types of brain activity can only be seen if you are asleep. If your doctor asks you to get less sleep than normal, plan to have someone drive you to and from the test. 
What happens during the test? 
· You will lie on your back on a bed or table. Or you might relax in a chair with your eyes closed. · A technologist will attach the electrodes to different places on your head. Or you might get a cap with fixed electrodes on it. · You will lie still with your eyes closed. The technologist will tell you not to talk unless you need to. · The technologist may ask you to: ¨ Breathe deeply and rapidly. This is called hyperventilating. ¨ Look at a bright, flashing light called a strobe. ¨ Go to sleep. If you can't fall asleep, you may get medicine to help you. What else should you know about the test? 
· There is no pain. No electrical current goes through your body. · If you have a seizure disorder such as epilepsy, the flashing lights or hyperventilation may cause a seizure. The technologist is trained to take care of you if this happens. · If electrodes are put in your nose, they may tickle. In rare cases, they can also cause some soreness or a small amount of bleeding for 1 to 2 days after the test. 
How long does the test take? · The test will take about 1 to 2 hours. What happens after the test? 
· You will probably be able to go home right away. But if you didn't sleep your normal amount before the test, have someone drive you home. · You can go back to your usual activities right away. When should you call for help? Watch closely for changes in your health, and be sure to contact your doctor if: 
· You have any problems that you think may be from the test. 
· You have any questions about the test or have not received your results. Follow-up care is a key part of your treatment and safety.  Be sure to make and go to all appointments, and call your doctor if you are having problems. It's also a good idea to keep a list of the medicines you take. Ask your doctor when you can expect to have your test results. Where can you learn more? Go to http://nicholas-stella.info/. Enter F196 in the search box to learn more about \"Electroencephalogram (EEG): About This Test.\" Current as of: October 14, 2016 Content Version: 11.4 © 9758-2635 [employer]. Care instructions adapted under license by [employer] (which disclaims liability or warranty for this information). If you have questions about a medical condition or this instruction, always ask your healthcare professional. [address] any warranty or liability for your use of this information. Introducing [facility]! [facility] introduces [employer] patient portal. Now you can access parts of your medical record, email your doctor's office, and request medication refills online. 1. In your internet browser, go to [email or web address] 2. Click on the First Time User? Click Here link in the Sign In box. You will see the New Member Sign Up page. 3. Enter your [employer] Access Code exactly as it appears below. You will not need to use this code after youve completed the sign-up process. If you do not sign up before the expiration date, you must request a new code. · [employer] Access Code: [email or web address] Expires: 9/11/2018  8:37 AM 
 
4. Enter the last four digits of your Social Security Number (xxxx) and Date of Birth (mm/dd/yyyy) as indicated and click Submit. You will be taken to the next sign-up page. 5. Create a Epizymet ID. This will be your [employer] login ID and cannot be changed, so think of one that is secure and easy to remember. 6. Create a [employer] password. You can change your password at any time. 7. Enter your Password Reset Question and Answer. This can be used at a later time if you forget your password. 8. Enter your e-mail address. You will receive e-mail notification when new information is available in [address] 9. Click Sign Up. You can now view and download portions of your medical record. 10. Click the Download Summary menu link to download a portable copy of your medical information. If you have questions, please visit the Frequently Asked Questions section of the [employer] website. Remember, [employer] is NOT to be used for urgent needs. For medical emergencies, dial 911. Now available from your iPhone and Android! Please provide this summary of care documentation to your next provider. Your primary care clinician is listed as Chelsea Mercado. If you have any questions after today's visit, please call [phone].

## 2018-06-28 NOTE — PATIENT INSTRUCTIONS
All test results will be discussed at the next appointment. Take Depakote 500 mg 1 tablet in the morning and 2 tablets at night. Electroencephalogram (EEG): About This Test  What is it? An electroencephalogram (EEG) lets a doctor see the electrical activity of your brain. You will have small pads or patches attached to different places on your head. These are called electrodes. Wires connect the electrodes to a computer. The computer records the activity of the brain. This looks like wavy lines on the computer screen or on paper. Why is this test done? The test is often used to diagnose epilepsy. It helps a doctor know what types of seizures are happening. An EEG can also check brain activity in people with sleep disorders. It can also help a doctor know why a person passed out (lost consciousness). How can you prepare for the test?  · Tell your doctor if you are taking any medicines. Your doctor may ask you to stop taking certain medicines before the test. These include sedatives and tranquilizers, muscle relaxants, sleeping aids, and seizure medicines. · Do not eat or drink anything with caffeine in it for 12 hours before the test. This includes cola, energy drinks, and chocolate. · Shampoo your hair and rinse with clear water the evening before or the morning of the test. Do not put any hair conditioner or oil on after you wash your hair. · Your doctor may ask you not to sleep the night before the test or to sleep for only about 4 or 5 hours. This is because some types of brain activity can only be seen if you are asleep. If your doctor asks you to get less sleep than normal, plan to have someone drive you to and from the test.  What happens during the test?  · You will lie on your back on a bed or table. Or you might relax in a chair with your eyes closed. · A technologist will attach the electrodes to different places on your head. Or you might get a cap with fixed electrodes on it.   · You will lie still with your eyes closed. The technologist will tell you not to talk unless you need to. · The technologist may ask you to:  ¨ Breathe deeply and rapidly. This is called hyperventilating. ¨ Look at a bright, flashing light called a strobe. ¨ Go to sleep. If you can't fall asleep, you may get medicine to help you. What else should you know about the test?  · There is no pain. No electrical current goes through your body. · If you have a seizure disorder such as epilepsy, the flashing lights or hyperventilation may cause a seizure. The technologist is trained to take care of you if this happens. · If electrodes are put in your nose, they may tickle. In rare cases, they can also cause some soreness or a small amount of bleeding for 1 to 2 days after the test.  How long does the test take? · The test will take about 1 to 2 hours. What happens after the test?  · You will probably be able to go home right away. But if you didn't sleep your normal amount before the test, have someone drive you home. · You can go back to your usual activities right away. When should you call for help? Watch closely for changes in your health, and be sure to contact your doctor if:  · You have any problems that you think may be from the test.  · You have any questions about the test or have not received your results. Follow-up care is a key part of your treatment and safety. Be sure to make and go to all appointments, and call your doctor if you are having problems. It's also a good idea to keep a list of the medicines you take. Ask your doctor when you can expect to have your test results. Where can you learn more? Go to http://nicholas-stella.info/. Enter F196 in the search box to learn more about \"Electroencephalogram (EEG): About This Test.\"  Current as of: October 14, 2016  Content Version: 11.4  © 1665-4593 [employer].  Care instructions adapted under license by [employer] (which disclaims liability or warranty for this information). If you have questions about a medical condition or this instruction, always ask your healthcare professional. [address] any warranty or liability for your use of this information.

## 2018-06-28 NOTE — PROGRESS NOTES
Neurology Consult Note      HISTORY PROVIDED BY: patient and significant other    Chief Complaint:   Chief Complaint   Patient presents with    Seizure    New Patient      Subjective:    Sally Andres is a 46 y.o. right handed male who presents in consultation for seizures  This is a 75-year-old right-handed black male with history of generalized anxiety disorder major depressive disorder, seizure disorder, recurrent DVT, PE, who was referred to the clinic to evaluate and manage seizure disorder  Patient was accompanied by his friend, according to patient's friend, his seizure has included, patient apparently was being followed at [facility] but due to the pain, patient did not go back to [facility] on the has not been followed by neurologist for quite some time. He says he is having seizure, frequency of seizures variable, but he has 2 or 3 seizures per month, last seizure was on June 25, 2018 about 3 days ago. Patient says a lot of time his seizure, without warning, it would just hit him sometimes, there have been warming sensation on one side of the body. No tongue biting. There is occasional tongue biting, but he wets himself. There is family history of seizure.   Review of Systems - General ROS: positive for  - fatigue and sleep disturbance  Psychological ROS: positive for - anxiety, concentration difficulties, depression, memory difficulties, mood swings and sleep disturbances  Ophthalmic ROS: positive for - blurry vision, decreased vision and photophobia  ENT ROS: positive for - headaches, vertigo and visual changes  Allergy and Immunology ROS: negative  Hematological and Lymphatic ROS: negative  Endocrine ROS: negative  Respiratory ROS: no cough, shortness of breath, or wheezing  Cardiovascular ROS: no chest pain or dyspnea on exertion  Gastrointestinal ROS: no abdominal pain, change in bowel habits, or black or bloody stools  Genito-Urinary ROS: no dysuria, trouble voiding, or hematuria  Musculoskeletal ROS: positive for - joint pain, joint stiffness, muscle pain and muscular weakness  Neurological ROS: positive for - dizziness, gait disturbance, memory loss, seizures, visual changes and weakness  Dermatological ROS: negative    Past Medical History:   Diagnosis Date    Anxiety disorder     Depression     DVT (deep venous thrombosis) ([facility]) 7/2/2013    Fatigue     Presence of IVC filter     Pulmonary embolism ([facility] 7/4/2013    Seizures ([facility])     monthly seizures- sometimes several per month- managed by PCP at this time, per caregiver    Trauma     hit in head wiht board playing [employer]       Past Surgical History:   Procedure Laterality Date    HX CRANIOTOMY  2006    Trauma repair    VASCULAR SURGERY PROCEDURE UNLIST  2013    IVC filter      Social History     Social History    Marital status: SINGLE     Spouse name: N/A    Number of children: N/A    Years of education: N/A     Occupational History    Not on file. Social History Main Topics    Smoking status: Never Smoker    Smokeless tobacco: Never Used    Alcohol use 5.0 oz/week     10 Cans of beer per week    Drug use: No    Sexual activity: Yes     Partners: Female     Other Topics Concern     Service No    Blood Transfusions No    Caffeine Concern No    Occupational Exposure No    Hobby Hazards No    Sleep Concern No    Stress Concern No    Weight Concern No    Special Diet No    Back Care No    Exercise No    Bike Helmet No    Seat Belt No    Self-Exams No     Social History Narrative    ** Merged History Encounter **         ** Data from: 7/22/13 Enc Dept: [facility]         ** Data from: 7/3/13 Enc Dept: [facility] [place]. 10th Grade education from [employer] [facility] [employer]" program.    Has a total of 8 brothers and sisters. Single with gilrfriend and 4 children. Hobbies: fishing.      Family History   Problem Relation Age of Onset    Alcohol abuse Father 21      of hepatic cirrhosis in his 45s. Was drinking 4-5 bottles of wine daily.  Seizures Father     Hypertension Mother 61    COPD Mother     Sleep Apnea Mother          Objective:   ROS  As per HPI. No Known Allergies     Meds:  Outpatient Medications Prior to Visit   Medication Sig Dispense Refill    apixaban (ELIQUIS) 5 mg tablet Take 1 Tab by mouth two (2) times a day. Indications: DEEP VEIN THROMBOSIS PREVENTION 60 Tab 5    lisinopril (PRINIVIL, ZESTRIL) 10 mg tablet TAKE 1 TABLET EVERY DAY 90 Tab 2    zonisamide (ZONEGRAN) 100 mg capsule TAKE 2 CAPSULES BY MOUTH TWICE A  Cap 4    tadalafil (CIALIS) 5 mg tablet Take 1 Tab by mouth as needed. 30 Tab 0    divalproex DR (DEPAKOTE) 500 mg tablet TAKE 1 TABLET IN MORNING AND EVERY EVENING 120 Tab 5    levETIRAcetam 1,000 mg tablet TAKE ONE TAB BY MOUTH EVERY MORNING AND A 1/2 TAB EVERY EVENING 90 Tab 3    divalproex DR (DEPAKOTE) 250 mg tablet TAKE ONE TAB BY MOUTH EVERY NIGHT AT BEDTIME 30 Tab 1     No facility-administered medications prior to visit.         Imaging:  MRI Results (most recent):    Results from Abstract encounter on 06/26/15   MRI BRAIN W WO CONT   CT Results (most recent):    Results from Abstract encounter on 06/26/15   CT SPINE CERV WO CONT     Reviewed [facility] in [facility] and media tab today    Lab Review   Results for orders placed or performed in visit on 18   LIPID PANEL   Result Value Ref Range    Cholesterol, total 241 (H) 100 - 199 mg/dL    Triglyceride 398 (H) 0 - 149 mg/dL    HDL Cholesterol 54 >39 mg/dL    VLDL, calculated 80 (H) 5 - 40 mg/dL    LDL, calculated 107 (H) 0 - 99 mg/dL   METABOLIC PANEL, COMPREHENSIVE   Result Value Ref Range    Glucose 94 65 - 99 mg/dL    BUN 15 6 - 24 mg/dL    Creatinine 0.87 0.76 - 1.27 mg/dL    GFR est non-AA 99 >59 mL/min/1.73    GFR est  >59 mL/min/1.73    BUN/Creatinine ratio 17 9 - 20    Sodium 137 134 - 144 mmol/L    Potassium 4.0 3.5 - 5.2 mmol/L    Chloride 102 96 - 106 mmol/L    CO2 22 20 - 29 mmol/L    Calcium 9.0 8.7 - 10.2 mg/dL    Protein, total 6.8 6.0 - 8.5 g/dL    Albumin 4.0 3.5 - 5.5 g/dL    GLOBULIN, TOTAL 2.8 1.5 - 4.5 g/dL    A-G Ratio 1.4 1.2 - 2.2    Bilirubin, total 0.6 0.0 - 1.2 mg/dL    Alk. phosphatase 50 39 - 117 IU/L    AST (SGOT) 24 0 - 40 IU/L    ALT (SGPT) 18 0 - 44 IU/L   CBC W/O DIFF   Result Value Ref Range    WBC 8.0 3.4 - 10.8 x10E3/uL    RBC 4.69 4.14 - 5.80 x10E6/uL    HGB 15.1 13.0 - 17.7 g/dL    HCT 43.6 37.5 - 51.0 %    MCV 93 79 - 97 fL    MCH 32.2 26.6 - 33.0 pg    MCHC 34.6 31.5 - 35.7 g/dL    RDW 14.6 12.3 - 15.4 %    PLATELET 371 618 - 563 x10E3/uL   TSH 3RD GENERATION   Result Value Ref Range    TSH 1.740 0.450 - 4.500 uIU/mL   HIV 1/2 AG/AB, 4TH GENERATION,W RFLX CONFIRM   Result Value Ref Range    HIV SCREEN 4TH GENERATION WRFX Non Reactive Non Reactive   HEPATITIS C AB   Result Value Ref Range    Hep C Virus Ab <0.1 0.0 - 0.9 s/co ratio   CHLAMYDIA/GC PCR   Result Value Ref Range    Chlamydia trachomatis, RAUL Negative Negative    Neisseria gonorrhoeae, RAUL Negative Negative   T PALLIDUM AB, BY FTA-ABS   Result Value Ref Range    T. pallidum Ab (FTA-Ab) Non Reactive Non Reactive   CVD REPORT   Result Value Ref Range    INTERPRETATION Note         Exam:  Visit Vitals    BP (!) 158/96 (BP 1 Location: Left arm, BP Patient Position: Sitting)  Comment: blood pressure medication taken per patient.  Pulse (!) 101    Temp 98.5 °F (36.9 °C) (Temporal)    Resp 14    Ht 5' 6\" (1.676 m)    Wt 155 lb 12.8 oz (70.7 kg)    SpO2 96%    BMI 25.15 kg/m2     General:  Alert, cooperative, no distress. Head:  Normocephalic, without obvious abnormality, atraumatic. Respiratory:  Heart:   Non labored breathing  Regular rate and rhythm, no murmurs   Neck:   2+ carotids, no bruits   Extremities: Warm, no cyanosis or edema. Pulses: 2+ radial pulses. Neurologic:  MS: Alert and oriented x 4, speech intact. Language intact, able to name, repeat, and follow all commands. Attention and fund of knowledge appropriate. Recent and remote memory intact. Cranial Nerves:  II: visual fields Full to confrontation   II: pupils Equal, round, reactive to light   II: optic disc No papilledema   III,VII: ptosis none   III,IV,VI: extraocular muscles  EOMI, no nystagmus or diplopia   V: facial light touch sensation  normal   VII: facial muscle function   symmetric   VIII: hearing intact   IX: soft palate elevation  normal   XI: trapezius strength  5/5   XI: sternocleidomastoid strength 5/5   XII: tongue  Midline     Motor: normal bulk and tone, no tremor              Strength: 5/5 throughout, no PD  Sensory: intact to LT, PP  Coordination: FTN and HTS intact, GITA intact,Romberg negative  Gait: normal gait, able to heel, toe, and tandem walk  Reflexes: 2+ symmetric, toes downgoing         Assessment/Plan       ICD-10-CM ICD-9-CM    1. Partial symptomatic epilepsy with complex partial seizures, not intractable, without status epilepticus ([facility]) G40.209 345.40 CK      RHEUMATOID FACTOR, QL      VITAMIN B12      VITAMIN D, 25 HYDROXY      NITESH, DIRECT, W/REFLEX      PROTEIN C DEFICIENCY      PROTEIN S PANEL      PROLACTIN      RPR      TSH 3RD GENERATION      EEG      ALDOLASE   2. Epilepsy with GTCS (generalized tonic clonic seizures) on awakening ([facility]) G40.409 345.10 CK      RHEUMATOID FACTOR, QL      VITAMIN B12      NITESH, DIRECT, W/REFLEX      PROTEIN C DEFICIENCY      PROTEIN S PANEL      PROLACTIN      RPR      TSH 3RD GENERATION      EEG      ALDOLASE   3. DVT (deep vein thrombosis) in pregnancy ([facility]) O22.30 671.30 CK    I82.409  RHEUMATOID FACTOR, QL      VITAMIN B12      VITAMIN D, 25 HYDROXY      NITESH, DIRECT, W/REFLEX      PROTEIN C DEFICIENCY      PROTEIN S PANEL      PROLACTIN      RPR      TSH 3RD GENERATION      ALDOLASE   4.  Moderate episode of recurrent major depressive disorder ([facility]) F33.1 296.32      Plan  Depakote  mg is changed to 1 p.o. every morning and 2 p.o. nightly  Keppra 1000 mg p.o. twice daily  EEG  Blood for autoimmune workup, prolactin, CPK, aldolase, vitamin D, RPR, protein S, protein C. Protein S and protein C check because of patient's recurrent. DVT. Follow-up Disposition:  Return in about 6 weeks (around 8/9/2018).       Signed:  Yolanda Vincent MD  6/29/2018

## 2018-07-24 ENCOUNTER — HOSPITAL ENCOUNTER (OUTPATIENT)
Dept: NEUROLOGY | Age: 53
Discharge: HOME | End: 2018-07-24
Attending: PSYCHIATRY & NEUROLOGY
Payer: MEDICAID

## 2018-07-24 DIAGNOSIS — G40.209 PARTIAL SYMPTOMATIC EPILEPSY WITH COMPLEX PARTIAL SEIZURES, NOT INTRACTABLE, WITHOUT STATUS EPILEPTICUS (HCC): ICD-10-CM

## 2018-07-24 DIAGNOSIS — G40.409 EPILEPSY WITH GTCS (GENERALIZED TONIC CLONIC SEIZURES) ON AWAKENING (HCC): ICD-10-CM

## 2018-07-24 PROCEDURE — 95816 EEG AWAKE AND DROWSY: CPT

## 2018-08-11 LAB
25(OH)D3+25(OH)D2 SERPL-MCNC: 9.6 NG/ML (ref 30–100)
ALDOLASE SERPL-CCNC: 6.4 U/L (ref 3.3–10.3)
ANA SER QL: NEGATIVE
CK SERPL-CCNC: 406 U/L (ref 24–204)
PROLACTIN SERPL-MCNC: 9.7 NG/ML (ref 4–15.2)
PROT S ACT/NOR PPP: 100 % (ref 63–140)
PROT S AG ACT/NOR PPP IA: 88 % (ref 60–150)
PROT S FREE AG ACT/NOR PPP IA: 144 % (ref 57–157)
RHEUMATOID FACT SERPL-ACNC: 10.8 IU/ML (ref 0–13.9)
RPR SER QL: NON REACTIVE
TSH SERPL DL<=0.005 MIU/L-ACNC: 1.43 UIU/ML (ref 0.45–4.5)
VIT B12 SERPL-MCNC: 903 PG/ML (ref 232–1245)

## 2018-08-13 ENCOUNTER — OFFICE VISIT (OUTPATIENT)
Dept: NEUROLOGY | Age: 53
End: 2018-08-13

## 2018-08-13 VITALS
RESPIRATION RATE: 18 BRPM | HEIGHT: 66 IN | DIASTOLIC BLOOD PRESSURE: 77 MMHG | WEIGHT: 162.6 LBS | OXYGEN SATURATION: 98 % | TEMPERATURE: 97.9 F | BODY MASS INDEX: 26.13 KG/M2 | SYSTOLIC BLOOD PRESSURE: 137 MMHG | HEART RATE: 92 BPM

## 2018-08-13 DIAGNOSIS — E55.9 VITAMIN D DEFICIENCY: ICD-10-CM

## 2018-08-13 DIAGNOSIS — R74.8 ELEVATED CK: ICD-10-CM

## 2018-08-13 DIAGNOSIS — G40.409 EPILEPSY WITH GTCS (GENERALIZED TONIC CLONIC SEIZURES) ON AWAKENING (HCC): ICD-10-CM

## 2018-08-13 DIAGNOSIS — G40.209 PARTIAL SYMPTOMATIC EPILEPSY WITH COMPLEX PARTIAL SEIZURES, NOT INTRACTABLE, WITHOUT STATUS EPILEPTICUS (HCC): ICD-10-CM

## 2018-08-13 DIAGNOSIS — F79 MR (MENTAL RETARDATION): Primary | ICD-10-CM

## 2018-08-13 RX ORDER — ERGOCALCIFEROL 1.25 MG/1
50000 CAPSULE ORAL
Qty: 4 CAP | Refills: 3 | Status: SHIPPED | OUTPATIENT
Start: 2018-08-13 | End: 2018-12-14 | Stop reason: SDUPTHER

## 2018-08-13 NOTE — MR AVS SNAPSHOT
Alie Sanchez 
 
 
 [address] [facility] [address] 
[phone] Patient: Lexi Hardy MRN: [id number] :1965 Visit Information Date & Time Provider Department Dept. Phone Encounter #  
 2018 10:20 AM Vinh Allen MD [facility] [phone] [id number] Follow-up Instructions Return in about 3 months (around 2018). Your Appointments 2018 10:20 AM  
Follow Up with Vinh Allen MD  
[facility]) [address] [facility] [address]  
  
   
 [address] [facility]  
  
    
 2018  8:45 AM  
ROUTINE CARE with Candance Dotter, MD  
[address] [facility] Appt Note: 3 month follow up on seizures [place] [address] [id number]  
[phone]  
  
   
 [place] [address] [facility] [address] Upcoming Health Maintenance Date Due COLONOSCOPY 1983 Influenza Age 5 to Adult 2018 DTaP/Tdap/Td series (2 - Td) 4/15/2025 Allergies as of 2018  Review Complete On: 2018 By: Evelio Corcoran MD  
 No Known Allergies Current Immunizations  Reviewed on 4/15/2015 Name Date Influenza Vaccine 2013 Influenza Vaccine Intradermal PF 2015, 10/6/2014 Tdap 4/15/2015, 2013 Not reviewed this visit You Were Diagnosed With   
  
 Codes Comments MR (mental retardation)    -  Primary ICD-10-CM: F79 
ICD-9-CM: 036 Partial symptomatic epilepsy with complex partial seizures, not intractable, without status epilepticus [facility]     ICD-10-CM: H11.067 ICD-9-CM: 345.40 Epilepsy with GTCS (generalized tonic clonic seizures) on awakening ([facility]     ICD-10-CM: G40.409 ICD-9-CM: 345.10 Vitamin D deficiency     ICD-10-CM: E55.9 ICD-9-CM: 268.9 Elevated CK     ICD-10-CM: R74.8 ICD-9-CM: 790.5 Vitals BP Pulse Temp Resp Height(growth percentile)  
 137/77 (BP 1 Location: Right arm, BP Patient Position: Sitting) 92 97.9 °F (36.6 °C) (Temporal) 18 5' 6\" (1.676 m) Weight(growth percentile) SpO2 BMI Smoking Status 162 lb 9.6 oz (73.8 kg) 98% 26.24 kg/m2 Never Smoker BMI and BSA Data Body Mass Index Body Surface Area  
 26.24 kg/m 2 1.85 m 2 Preferred Pharmacy Pharmacy Name Phone [facility] [address] [phone] Your Updated Medication List  
  
   
This list is accurate as of 8/13/18  9:37 AM.  Always use your most recent med list.  
  
  
  
  
 apixaban 5 mg tablet Commonly known as:  Lyell Mola Take 1 Tab by mouth two (2) times a day. Indications: DEEP VEIN THROMBOSIS PREVENTION  
  
 divalproex  mg tablet Commonly known as:  DEPAKOTE Take 1 Tab by mouth three (3) times daily. Take I tab in the morning, 2 tabs at night  
  
 ergocalciferol 50,000 unit capsule Commonly known as:  ERGOCALCIFEROL Take 1 Cap by mouth every seven (7) days. levETIRAcetam 1,000 mg tablet Commonly known as:  KEPPRA Take 1 Tab by mouth two (2) times a day. lisinopril 10 mg tablet Commonly known as:  PRINIVIL, ZESTRIL  
TAKE 1 TABLET EVERY DAY  
  
 tadalafil 5 mg tablet Commonly known as:  CIALIS Take 1 Tab by mouth as needed. zonisamide 100 mg capsule Commonly known as:  ZONEGRAN  
TAKE 2 CAPSULES BY MOUTH TWICE A DAY Prescriptions Sent to Pharmacy Refills  
 ergocalciferol (ERGOCALCIFEROL) 50,000 unit capsule 3 Sig: Take 1 Cap by mouth every seven (7) days. Class: Normal  
 Pharmacy: [address] [place] [address] #: [phone] Route: Oral  
  
Follow-up Instructions Return in about 3 months (around 11/13/2018). Introducing [facility]! Jorge Edmondson introduces [employer] patient portal. Now you can access parts of your medical record, email your doctor's office, and request medication refills online. 1. In your internet browser, go to [email or web address] 2. Click on the First Time User? Click Here link in the Sign In box. You will see the New Member Sign Up page. 3. Enter your [employer] Access Code exactly as it appears below. You will not need to use this code after youve completed the sign-up process. If you do not sign up before the expiration date, you must request a new code. · [employer] Access Code: [email or web address] Expires: 9/11/2018  8:37 AM 
 
4. Enter the last four digits of your Social Security Number (xxxx) and Date of Birth (mm/dd/yyyy) as indicated and click Submit. You will be taken to the next sign-up page. 5. Create a Hyglos ID. This will be your Hy[employer]os login ID and cannot be changed, so think of one that is secure and easy to remember. 6. Create a Hy[employer]os password. You can change your password at any time. 7. Enter your Password Reset Question and Answer. This can be used at a later time if you forget your password. 8. Enter your e-mail address. You will receive e-mail notification when new information is available in [address] 9. Click Sign Up. You can now view and download portions of your medical record. 10. Click the Download Summary menu link to download a portable copy of your medical information. If you have questions, please visit the Frequently Asked Questions section of the [employer] website. Remember, [employer] is NOT to be used for urgent needs. For medical emergencies, dial 911. Now available from your iPhone and Android! Please provide this summary of care documentation to your next provider. Your primary care clinician is listed as Yifan Chin. If you have any questions after today's visit, please call [phone].

## 2018-08-13 NOTE — PROGRESS NOTES
Chief Complaint   Patient presents with    Seizure    Results     EEG and Labs     1. Have you been to the ER, urgent care clinic since your last visit? Hospitalized since your last visit? no    2. Have you seen or consulted any other health care providers outside of the [address] [place] [address] since your last visit? Include any pap smears or colon screening.  no

## 2018-08-13 NOTE — PROGRESS NOTES
Neurology Progress Note    NAME:  Raven Iraheta   :   1965   MRN:   [id number]     Date/Time:  2018  Subjective:      Raven Iraheta is a 46 y.o. male here today for follow-up for seizure disorder, test results. Patient was accompanied by his caregiver. No seizure was reported since the last visit, patient is said to be doing fairly well. No behavioral problem reported. Patient denies neck pain, chest pain, dysphagia, odynophagia, constipation or diarrhea. EEG revealed with patient caregiver showed occasional background slowing without overt epileptic activity which may represent post ictal state versus cerebral disturbance. Blood work showed low vitamin D 9.6 and elevated , will monitor CPK replace vitamin D. Patient was advised on liberal fluid intake. At this time I will continue patient on his current medication for seizures no seizure has been reported for quite some time.   Review of Systems - General ROS: positive for  - fatigue  Psychological ROS: positive for - anxiety, concentration difficulties and depression  Ophthalmic ROS: positive for - decreased vision  ENT ROS: positive for - tinnitus and vertigo  Allergy and Immunology ROS: negative  Hematological and Lymphatic ROS: negative  Endocrine ROS: negative  Respiratory ROS: no cough, shortness of breath, or wheezing  Cardiovascular ROS: no chest pain or dyspnea on exertion  Gastrointestinal ROS: no abdominal pain, change in bowel habits, or black or bloody stools  Genito-Urinary ROS: no dysuria, trouble voiding, or hematuria  Musculoskeletal ROS: positive for - joint pain, joint stiffness, joint swelling, muscle pain and muscular weakness  Neurological ROS: positive for - dizziness, headaches, impaired coordination/balance, seizures, visual changes and weakness  Dermatological ROS: negative      Medications reviewed:  Current Outpatient Prescriptions   Medication Sig Dispense Refill    ergocalciferol (ERGOCALCIFEROL) 50,000 unit capsule Take 1 Cap by mouth every seven (7) days. 4 Cap 3    divalproex DR (DEPAKOTE) 500 mg tablet Take 1 Tab by mouth three (3) times daily. Take I tab in the morning, 2 tabs at night 90 Tab 1    levETIRAcetam (KEPPRA) 1,000 mg tablet Take 1 Tab by mouth two (2) times a day. 60 Tab 1    apixaban (ELIQUIS) 5 mg tablet Take 1 Tab by mouth two (2) times a day. Indications: DEEP VEIN THROMBOSIS PREVENTION 60 Tab 5    lisinopril (PRINIVIL, ZESTRIL) 10 mg tablet TAKE 1 TABLET EVERY DAY 90 Tab 2    tadalafil (CIALIS) 5 mg tablet Take 1 Tab by mouth as needed.  30 Tab 0    zonisamide (ZONEGRAN) 100 mg capsule TAKE 2 CAPSULES BY MOUTH TWICE A  Cap 4        Objective:   Vitals:  Vitals:    08/13/18 0917   BP: 137/77   Pulse: 92   Resp: 18   Temp: 97.9 °F (36.6 °C)   TempSrc: Temporal   SpO2: 98%   Weight: 162 lb 9.6 oz (73.8 kg)   Height: 5' 6\" (1.676 m)   PainSc:   0 - No pain     Lab Data Reviewed:  Lab Results   Component Value Date/Time    WBC 8.0 06/20/2018 11:55 AM    HCT 43.6 06/20/2018 11:55 AM    HGB 15.1 06/20/2018 11:55 AM    PLATELET 620 31/44/1386 11:55 AM       Lab Results   Component Value Date/Time    Sodium 137 06/20/2018 11:55 AM    Potassium 4.0 06/20/2018 11:55 AM    Chloride 102 06/20/2018 11:55 AM    CO2 22 06/20/2018 11:55 AM    Glucose 94 06/20/2018 11:55 AM    BUN 15 06/20/2018 11:55 AM    Creatinine 0.87 06/20/2018 11:55 AM    Calcium 9.0 06/20/2018 11:55 AM       No components found for: TROPQUANT    No results found for: NITESH      Lab Results   Component Value Date/Time    Hemoglobin A1c 4.9 02/21/2017 02:09 PM        Lab Results   Component Value Date/Time    Vitamin B12 903 08/09/2018 01:18 PM    Folate 7.2 04/20/2015 08:41 AM       No results found for: Estefania DOWLING XBANA    Lab Results   Component Value Date/Time    Cholesterol, total 241 (H) 06/20/2018 11:55 AM    HDL Cholesterol 54 06/20/2018 11:55 AM    LDL, calculated 107 (H) 06/20/2018 11:55 AM    VLDL, calculated 80 (H) 06/20/2018 11:55 AM    Triglyceride 398 (H) 06/20/2018 11:55 AM         CT Results (recent):    Results from Abstract encounter on 06/26/15   CT SPINE CERV WO CONT    MRI Results (recent):    Results from Abstract encounter on 06/26/15   MRI BRAIN W WO CONT    IR Results (recent):  No results found for this or any previous visit. VAS/US Results (recent):    Results from Hospital Encounter encounter on 06/09/16   DUPLEX LOWER EXT VENOUS RIGHT    PHYSICAL EXAM:  General:    Alert, cooperative, no distress, appears stated age. Head:   Normocephalic, without obvious abnormality, atraumatic. Eyes:   Conjunctivae/corneas clear. PERRLA  Nose:  Nares normal. No drainage or sinus tenderness. Throat:    Lips, mucosa, and tongue normal.  No Thrush  Neck:  Supple, symmetrical,  no adenopathy, thyroid: non tender    no carotid bruit and no JVD. Back:    Symmetric,  No CVA tenderness. Lungs:   Clear to auscultation bilaterally. No Wheezing or Rhonchi. No rales. Chest wall:  No tenderness or deformity. No Accessory muscle use. Heart:   Regular rate and rhythm,  no murmur, rub or gallop. Abdomen:   Soft, non-tender. Not distended. Bowel sounds normal. No masses  Extremities: Extremities normal, atraumatic, No cyanosis. No edema. No clubbing  Skin:     Texture, turgor normal. No rashes or lesions. Not Jaundiced  Lymph nodes: Cervical, supraclavicular normal.  Psych:  Good insight. Not depressed. Not anxious or agitated. NEUROLOGICAL EXAM:  Appearance: The patient is well developed, well nourished,  and is in no acute distress. Mental Status: Oriented to time, place and person. Mood and affect appropriate. Cranial Nerves:   Intact visual fields. Fundi are benign. DANNY, EOM's full, no nystagmus, no ptosis. Facial sensation is normal. Corneal reflexes are intact. Facial movement is symmetric. Hearing is normal bilaterally.  Palate is midline with normal sternocleidomastoid and trapezius muscles are normal. Tongue is midline. Motor:  5/5 strength in upper and lower proximal and distal muscles. Normal bulk and tone. No fasciculations. Reflexes:   Deep tendon reflexes 2+/4 and symmetrical.   Sensory:   Normal to touch, pinprick and vibration. Gait:  Abnormal gait. Tremor:   No tremor noted. Cerebellar:  No cerebellar signs present. Neurovascular:  Normal heart sounds and regular rhythm, peripheral pulses intact, and no carotid bruits. Assesment  1. MR (mental retardation)  Stable    2. Partial symptomatic epilepsy with complex partial seizures, not intractable, without status epilepticus ([address]  Continue management    3. Epilepsy with GTCS (generalized tonic clonic seizures) on awakening ([facility]  Continue management    4. Vitamin D deficiency  Replace vitamin D    5. Elevated CK  Monitor CPK, advised on labral fluid intake    ___________________________________________________  PLAN: Medication and plan discussed with patient and caregiver. ICD-10-CM ICD-9-CM    1. MR (mental retardation) F79 319    2. Partial symptomatic epilepsy with complex partial seizures, not intractable, without status epilepticus ([facility] [facility] G40.209 345.40    3. Epilepsy with GTCS (generalized tonic clonic seizures) on awakening ([facility]) G40.409 345.10    4. Vitamin D deficiency E55.9 268.9    5. Elevated CK R74.8 790.5      Follow-up Disposition:  Return in about 3 months (around 11/13/2018).            ___________________________________________________    Total time spent with patient:  []15   []25   []35   [] __ minutes    Care Plan discussed with:    []Patient   []Family    []Care Manager   []Consultant/Specialist :    ___________________________________________________    Attending Physician: Ezekiel Merida MD

## 2018-08-30 NOTE — TELEPHONE ENCOUNTER
Note from Pharmacy: Alternative requested: Good Afternoon need new Rx for pt. Pt stated that there is a change in dosage: please send in new rx so insurance will cover Rx thank you.

## 2018-09-07 RX ORDER — DIVALPROEX SODIUM 500 MG/1
500 TABLET, DELAYED RELEASE ORAL 3 TIMES DAILY
Qty: 90 TAB | Refills: 1 | Status: SHIPPED | OUTPATIENT
Start: 2018-09-07 | End: 2018-09-13 | Stop reason: SDUPTHER

## 2018-09-13 ENCOUNTER — OFFICE VISIT (OUTPATIENT)
Dept: INTERNAL MEDICINE CLINIC | Age: 53
End: 2018-09-13

## 2018-09-13 VITALS
HEART RATE: 87 BPM | OXYGEN SATURATION: 98 % | RESPIRATION RATE: 18 BRPM | DIASTOLIC BLOOD PRESSURE: 68 MMHG | BODY MASS INDEX: 26.52 KG/M2 | SYSTOLIC BLOOD PRESSURE: 121 MMHG | TEMPERATURE: 98.6 F | HEIGHT: 66 IN | WEIGHT: 165 LBS

## 2018-09-13 DIAGNOSIS — Z23 ENCOUNTER FOR IMMUNIZATION: ICD-10-CM

## 2018-09-13 DIAGNOSIS — Z79.01 ANTICOAGULANT LONG-TERM USE: ICD-10-CM

## 2018-09-13 DIAGNOSIS — F79 MR (MENTAL RETARDATION): ICD-10-CM

## 2018-09-13 DIAGNOSIS — E55.9 VITAMIN D DEFICIENCY: ICD-10-CM

## 2018-09-13 DIAGNOSIS — E78.1 HYPERTRIGLYCERIDEMIA: ICD-10-CM

## 2018-09-13 DIAGNOSIS — R56.9 SEIZURES (HCC): ICD-10-CM

## 2018-09-13 DIAGNOSIS — I10 ESSENTIAL HYPERTENSION: Primary | ICD-10-CM

## 2018-09-13 DIAGNOSIS — I82.401 DEEP VEIN THROMBOSIS (DVT) OF RIGHT LOWER EXTREMITY, UNSPECIFIED CHRONICITY, UNSPECIFIED VEIN (HCC): ICD-10-CM

## 2018-09-13 RX ORDER — LEVETIRACETAM 1000 MG/1
1000 TABLET ORAL 2 TIMES DAILY
Qty: 60 TAB | Refills: 5 | Status: SHIPPED | OUTPATIENT
Start: 2018-09-13 | End: 2018-09-25 | Stop reason: SDUPTHER

## 2018-09-13 RX ORDER — DIVALPROEX SODIUM 500 MG/1
500 TABLET, DELAYED RELEASE ORAL 3 TIMES DAILY
Qty: 90 TAB | Refills: 5 | Status: SHIPPED | OUTPATIENT
Start: 2018-09-13 | End: 2018-11-10
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
## 2018-09-13 NOTE — PROGRESS NOTES
Chief Complaint Patient presents with  Seizure 1. Have you been to the ER, urgent care clinic since your last visit? Hospitalized since your last visit? Yes When: [facility] last Thursday for seizure 2. Have you seen or consulted any other health care providers outside of the [address] [place] [address] since your last visit? Include any pap smears or colon screening.  No

## 2018-09-13 NOTE — MR AVS SNAPSHOT
Merlin Laroche 
 
 
 [facility] [address] [facility] [address] 
[phone] Patient: Robert Solares MRN: [id number] :[id number] Visit Information Date & Time Provider Department Dept. Phone Encounter #  
 2018  8:45 AM Dex Christian, [address] [id number] Follow-up Instructions Return in about 4 months (around 2019). Your Appointments 2018  9:40 AM  
Follow Up with Vinh Decker MD  
[address] [place] [address] Neurology Clinic at [facility]) Appt Note: fuv  
 [address] Upcoming Health Maintenance Date Due COLONOSCOPY 1983 Influenza Age 5 to Adult 2018 DTaP/Tdap/Td series (2 - Td) 4/15/2025 Allergies as of 2018  Review Complete On: 2018 By: Linda Torres LPN No Known Allergies Current Immunizations  Reviewed on 4/15/2015 Name Date Influenza Vaccine 2013 Influenza Vaccine (Quad) PF  Incomplete Influenza Vaccine Intradermal PF 2015, 10/6/2014 Tdap 4/15/2015, 2013 Not reviewed this visit You Were Diagnosed With   
  
 Codes Comments Essential hypertension    -  Primary ICD-10-CM: I10 
ICD-9-CM: 401.9 Anticoagulant long-term use     ICD-10-CM: Z79.01 
ICD-9-CM: V58.61   
 MR (mental retardation)     ICD-10-CM: F79 
ICD-9-CM: 068 Seizures ([facility] [address]     ICD-10-CM: R56.9 ICD-9-CM: 780.39 Hypertriglyceridemia     ICD-10-CM: E78.1 ICD-9-CM: 272.1 Vitamin D deficiency     ICD-10-CM: E55.9 ICD-9-CM: 268.9 Deep vein thrombosis (DVT) of right lower extremity, unspecified chronicity, unspecified vein (HCC)     ICD-10-CM: I82.401 ICD-9-CM: 453.40 Encounter for immunization     ICD-10-CM: O68 ICD-9-CM: V03.89 Vitals BP Pulse Temp Resp Height(growth percentile) Weight(growth percentile) 121/68 (BP 1 Location: Left arm, BP Patient Position: Sitting) 87 98.6 °F (37 °C) (Oral) 18 5' 6\" (1.676 m) 165 lb (74.8 kg) SpO2 BMI Smoking Status 98% 26.63 kg/m2 Never Smoker Vitals History BMI and BSA Data Body Mass Index Body Surface Area  
 26.63 kg/m 2 1.87 m 2 Preferred Pharmacy Pharmacy Name Phone [facility] [address] [place] [address] [phone] Your Updated Medication List  
  
   
This list is accurate as of 9/13/18  9:14 AM.  Always use your most recent med list.  
  
  
  
  
 apixaban 5 mg tablet Commonly known as:  Terra Ly Take 1 Tab by mouth two (2) times a day. Indications: DEEP VEIN THROMBOSIS PREVENTION  
  
 divalproex  mg tablet Commonly known as:  DEPAKOTE Take 1 Tab by mouth three (3) times daily. Take I tab in the morning, 2 tabs at night  
  
 ergocalciferol 50,000 unit capsule Commonly known as:  ERGOCALCIFEROL Take 1 Cap by mouth every seven (7) days. levETIRAcetam 1,000 mg tablet Commonly known as:  KEPPRA Take 1 Tab by mouth two (2) times a day. lisinopril 10 mg tablet Commonly known as:  PRINIVIL, ZESTRIL  
TAKE 1 TABLET EVERY DAY  
  
 tadalafil 5 mg tablet Commonly known as:  CIALIS Take 1 Tab by mouth as needed. Prescriptions Sent to Pharmacy Refills  
 levETIRAcetam (KEPPRA) 1,000 mg tablet 5 Sig: Take 1 Tab by mouth two (2) times a day. Class: Normal  
 Pharmacy: [address] [place] [address] Ph #: [phone] Route: Oral  
 divalproex DR (DEPAKOTE) 500 mg tablet 5 Sig: Take 1 Tab by mouth three (3) times daily. Take I tab in the morning, 2 tabs at night Class: Normal  
 Pharmacy: [address] [place] [address] Ph #: [phone]  Route: Oral  
 apixaban (ELIQUIS) 5 mg tablet 11 Sig: Take 1 Tab by mouth two (2) times a day. Indications: DEEP VEIN THROMBOSIS PREVENTION Class: Normal  
 Pharmacy: [address] [place] [address], [address] [place] [address] #: [phone] Route: Oral  
  
We Performed the Following INFLUENZA VIRUS VAC QUAD,SPLIT,PRESV FREE SYRINGE IM [id number] CPT(R)] Follow-up Instructions Return in about 4 months (around 1/13/2019). Please provide this summary of care documentation to your next provider. Your primary care clinician is listed as William Maki. If you have any questions after today's visit, please call [phone].

## 2018-11-10 ENCOUNTER — APPOINTMENT (OUTPATIENT)
Dept: CT IMAGING | Age: 53
End: 2018-11-10
Attending: PHYSICIAN ASSISTANT
Payer: MEDICAID

## 2018-11-10 ENCOUNTER — HOSPITAL ENCOUNTER (EMERGENCY)
Age: 53
Discharge: HOME | End: 2018-11-10
Attending: EMERGENCY MEDICINE
Payer: MEDICAID

## 2018-11-10 VITALS
HEIGHT: 65 IN | WEIGHT: 168 LBS | SYSTOLIC BLOOD PRESSURE: 145 MMHG | TEMPERATURE: 97.8 F | OXYGEN SATURATION: 99 % | DIASTOLIC BLOOD PRESSURE: 94 MMHG | RESPIRATION RATE: 16 BRPM | BODY MASS INDEX: 27.99 KG/M2 | HEART RATE: 94 BPM

## 2018-11-10 VITALS
RESPIRATION RATE: 15 BRPM | SYSTOLIC BLOOD PRESSURE: 121 MMHG | TEMPERATURE: 98.3 F | HEART RATE: 77 BPM | BODY MASS INDEX: 27.99 KG/M2 | WEIGHT: 168 LBS | OXYGEN SATURATION: 100 % | HEIGHT: 65 IN | DIASTOLIC BLOOD PRESSURE: 76 MMHG

## 2018-11-10 DIAGNOSIS — R82.998 DARK URINE: ICD-10-CM

## 2018-11-10 DIAGNOSIS — I10 HYPERTENSION, UNSPECIFIED TYPE: ICD-10-CM

## 2018-11-10 DIAGNOSIS — R79.89 ELEVATED LFTS: ICD-10-CM

## 2018-11-10 DIAGNOSIS — R17 JAUNDICE: ICD-10-CM

## 2018-11-10 DIAGNOSIS — R17 SCLERAL ICTERUS: Primary | ICD-10-CM

## 2018-11-10 DIAGNOSIS — T42.6X1A VALPROIC ACID TOXICITY, ACCIDENTAL OR UNINTENTIONAL, INITIAL ENCOUNTER: Primary | ICD-10-CM

## 2018-11-10 LAB
ALBUMIN SERPL-MCNC: 2.7 G/DL (ref 3.5–5)
ALBUMIN/GLOB SERPL: 0.6 {RATIO} (ref 1.1–2.2)
ALP SERPL-CCNC: 138 U/L (ref 45–117)
ALT SERPL-CCNC: 390 U/L (ref 12–78)
ANION GAP SERPL CALC-SCNC: 9 MMOL/L (ref 5–15)
APPEARANCE UR: CLEAR
AST SERPL-CCNC: 773 U/L (ref 15–37)
BACTERIA URNS QL MICRO: NEGATIVE /HPF
BASOPHILS # BLD: 0.1 K/UL (ref 0–0.1)
BASOPHILS NFR BLD: 1 % (ref 0–1)
BILIRUB DIRECT SERPL-MCNC: 8.9 MG/DL (ref 0–0.2)
BILIRUB INDIRECT SERPL-MCNC: 2.3 MG/DL (ref 0–1.1)
BILIRUB SERPL-MCNC: 11.1 MG/DL (ref 0.2–1)
BILIRUB SERPL-MCNC: 11.2 MG/DL (ref 0.2–1)
BILIRUB UR QL CFM: NEGATIVE
BUN SERPL-MCNC: 10 MG/DL (ref 6–20)
BUN/CREAT SERPL: 11 (ref 12–20)
CALCIUM SERPL-MCNC: 8.5 MG/DL (ref 8.5–10.1)
CHLORIDE SERPL-SCNC: 99 MMOL/L (ref 97–108)
CO2 SERPL-SCNC: 29 MMOL/L (ref 21–32)
COLOR UR: YELLOW
CREAT SERPL-MCNC: 0.9 MG/DL (ref 0.7–1.3)
DIFFERENTIAL METHOD BLD: ABNORMAL
EOSINOPHIL # BLD: 0.1 K/UL (ref 0–0.4)
EOSINOPHIL NFR BLD: 1 % (ref 0–7)
EPITH CASTS URNS QL MICRO: NORMAL /LPF
ERYTHROCYTE [DISTWIDTH] IN BLOOD BY AUTOMATED COUNT: 14.2 % (ref 11.5–14.5)
GLOBULIN SER CALC-MCNC: 4.3 G/DL (ref 2–4)
GLUCOSE SERPL-MCNC: 92 MG/DL (ref 65–100)
GLUCOSE UR STRIP.AUTO-MCNC: NEGATIVE MG/DL
HCT VFR BLD AUTO: 44.7 % (ref 36.6–50.3)
HGB BLD-MCNC: 15.7 G/DL (ref 12.1–17)
HGB UR QL STRIP: NEGATIVE
IMM GRANULOCYTES # BLD: 0.1 K/UL (ref 0–0.04)
IMM GRANULOCYTES NFR BLD AUTO: 2 % (ref 0–0.5)
KETONES UR QL STRIP.AUTO: NEGATIVE MG/DL
LEUKOCYTE ESTERASE UR QL STRIP.AUTO: NEGATIVE
LIPASE SERPL-CCNC: 327 U/L (ref 73–393)
LYMPHOCYTES # BLD: 2.4 K/UL (ref 0.8–3.5)
LYMPHOCYTES NFR BLD: 33 % (ref 12–49)
MCH RBC QN AUTO: 31.7 PG (ref 26–34)
MCHC RBC AUTO-ENTMCNC: 35.1 G/DL (ref 30–36.5)
MCV RBC AUTO: 90.3 FL (ref 80–99)
MONOCYTES # BLD: 1.5 K/UL (ref 0–1)
MONOCYTES NFR BLD: 21 % (ref 5–13)
NEUTS SEG # BLD: 3.1 K/UL (ref 1.8–8)
NEUTS SEG NFR BLD: 42 % (ref 32–75)
NITRITE UR QL STRIP.AUTO: NEGATIVE
NRBC # BLD: 0 K/UL (ref 0–0.01)
NRBC BLD-RTO: 0 PER 100 WBC
PH UR STRIP: 6 [PH] (ref 5–8)
PLATELET # BLD AUTO: 218 K/UL (ref 150–400)
PMV BLD AUTO: 11.6 FL (ref 8.9–12.9)
POTASSIUM SERPL-SCNC: 4.1 MMOL/L (ref 3.5–5.1)
PROT SERPL-MCNC: 7 G/DL (ref 6.4–8.2)
PROT UR STRIP-MCNC: NEGATIVE MG/DL
RBC # BLD AUTO: 4.95 M/UL (ref 4.1–5.7)
RBC #/AREA URNS HPF: NORMAL /HPF (ref 0–5)
RBC MORPH BLD: ABNORMAL
SODIUM SERPL-SCNC: 137 MMOL/L (ref 136–145)
SP GR UR REFRACTOMETRY: 1.01 (ref 1–1.03)
UA: UC IF INDICATED,UAUC: NORMAL
UROBILINOGEN UR QL STRIP.AUTO: 1 EU/DL (ref 0.2–1)
VALPROATE SERPL-MCNC: 137 UG/ML (ref 50–100)
WBC # BLD AUTO: 7.3 K/UL (ref 4.1–11.1)
WBC URNS QL MICRO: NORMAL /HPF (ref 0–4)

## 2018-11-10 PROCEDURE — 74177 CT ABD & PELVIS W/CONTRAST: CPT

## 2018-11-10 PROCEDURE — 74011250636 HC RX REV CODE- 250/636: Performed by: PHYSICIAN ASSISTANT

## 2018-11-10 PROCEDURE — 85025 COMPLETE CBC W/AUTO DIFF WBC: CPT

## 2018-11-10 PROCEDURE — 96360 HYDRATION IV INFUSION INIT: CPT

## 2018-11-10 PROCEDURE — 74011636320 HC RX REV CODE- 636/320: Performed by: EMERGENCY MEDICINE

## 2018-11-10 PROCEDURE — 36415 COLL VENOUS BLD VENIPUNCTURE: CPT

## 2018-11-10 PROCEDURE — 81001 URINALYSIS AUTO W/SCOPE: CPT

## 2018-11-10 PROCEDURE — 99282 EMERGENCY DEPT VISIT SF MDM: CPT

## 2018-11-10 PROCEDURE — 80053 COMPREHEN METABOLIC PANEL: CPT

## 2018-11-10 PROCEDURE — 82248 BILIRUBIN DIRECT: CPT

## 2018-11-10 PROCEDURE — 99283 EMERGENCY DEPT VISIT LOW MDM: CPT

## 2018-11-10 PROCEDURE — 80164 ASSAY DIPROPYLACETIC ACD TOT: CPT

## 2018-11-10 PROCEDURE — 83690 ASSAY OF LIPASE: CPT

## 2018-11-10 RX ORDER — SODIUM CHLORIDE 0.9 % (FLUSH) 0.9 %
5-10 SYRINGE (ML) INJECTION EVERY 8 HOURS
Status: DISCONTINUED | OUTPATIENT
Start: 2018-11-10 | End: 2018-11-10 | Stop reason: HOSPADM

## 2018-11-10 RX ORDER — SODIUM CHLORIDE 0.9 % (FLUSH) 0.9 %
10 SYRINGE (ML) INJECTION
Status: COMPLETED | OUTPATIENT
Start: 2018-11-10 | End: 2018-11-10

## 2018-11-10 RX ORDER — LEVETIRACETAM 750 MG/1
1500 TABLET ORAL 2 TIMES DAILY
Qty: 28 TAB | Refills: 0 | Status: SHIPPED | OUTPATIENT
Start: 2018-11-10 | End: 2018-11-17

## 2018-11-10 RX ORDER — SODIUM CHLORIDE 0.9 % (FLUSH) 0.9 %
5-10 SYRINGE (ML) INJECTION AS NEEDED
Status: DISCONTINUED | OUTPATIENT
Start: 2018-11-10 | End: 2018-11-10 | Stop reason: HOSPADM

## 2018-11-10 RX ADMIN — SODIUM CHLORIDE 1000 ML: 900 INJECTION, SOLUTION INTRAVENOUS at 12:16

## 2018-11-10 RX ADMIN — IOPAMIDOL 100 ML: 755 INJECTION, SOLUTION INTRAVENOUS at 15:56

## 2018-11-10 RX ADMIN — Medication 10 ML: at 15:56

## 2018-11-10 NOTE — ED NOTES
Patient here with c/o eye discoloration. Patient reports symptoms for 4-5 days. Patient denies pain, denies vision changes. Patient also reports increased urine frequency. Patient reports drinking beer most days. States last seizure a couple months ago. Emergency Department Nursing Plan of Care The Nursing Plan of Care is developed from the Nursing assessment and Emergency Department Attending provider initial evaluation. The plan of care may be reviewed in the ED Provider note. The Plan of Care was developed with the following considerations:  
Patient / Family readiness to learn indicated by:verbalized understanding Persons(s) to be included in education: patient Barriers to Learning/Limitations:No 
 
Signed Harish Wynn RN   
11/10/2018   11:34 AM

## 2018-11-10 NOTE — DISCHARGE INSTRUCTIONS
Jaundice: Care Instructions  Your Care Instructions    Jaundice is yellowing of your skin and the whites of your eyes. It's caused by a pigment, or coloring, called bilirubin. This comes from the breakdown of red blood cells. When your liver is healthy, it removes the pigment from your blood. But if the liver isn't working right, the pigment can build up in the blood. Then it can get into the skin and other tissues. Many diseases can cause jaundice. These include hepatitis, gallstones, and cancer of the pancreas. Liver damage from heavy drinking over a long time can also cause it. Some medicines that can damage the liver also cause jaundice. The treatment for jaundice depends on the cause. You may need medicine to treat an infection. Or you may need to have your gallbladder removed. Some people need to stop drinking alcohol. If another disease is causing jaundice, treating the disease will cure the jaundice. If a medicine you take is causing jaundice, your doctor may switch you to another one. Follow-up care is a key part of your treatment and safety. Be sure to make and go to all appointments, and call your doctor if you are having problems. It's also a good idea to know your test results and keep a list of the medicines you take. How can you care for yourself at home? · Do not drink any alcohol until your jaundice is gone. Alcohol will damage the liver more. If your jaundice is caused by drinking alcohol, do not drink alcohol even when you are better. Tell your doctor if you need help to quit. Counseling, support groups, and sometimes medicines can help you stay sober. · Be safe with medicines. Do not take any other medicine without talking to your doctor first. This includes over-the-counter medicines, vitamins, and herbal products. · Make sure your doctor knows all the medicines you take. Some medicines, such as acetaminophen (Tylenol), can make liver problems worse.   · If you have itchy skin, keep cool and stay out of the sun. Try to wear cotton clothing. Talk to your doctor about using over-the-counter medicines for itching. These include diphenhydramine (Benadryl) and chlorpheniramine (Chlor-Trimeton). Follow the instructions on the label. · Lower your activity to match your energy. When should you call for help? Call 911 anytime you think you may need emergency care. For example, call if:    · You have severe trouble breathing.     · You passed out (lost consciousness).    Call your doctor now or seek immediate medical care if:    · You are confused, or your confusion gets worse.     · You have a fever or chills.     · You have severe pain or swelling in your belly.     · You are losing weight without trying.     · You are vomiting or feel sick to your stomach.     · Your urine is dark yellow-brown, or your stools are light-colored.    Watch closely for changes in your health, and be sure to contact your doctor if:    · You do not get better as expected. Where can you learn more? Go to http://nicholas-stella.info/. Enter A728 in the search box to learn more about \"Jaundice: Care Instructions. \"  Current as of: March 28, 2018  Content Version: 11.8  © 2667-8074 [employer]. Care instructions adapted under license by [employer] (which disclaims liability or warranty for this information). If you have questions about a medical condition or this instruction, always ask your healthcare professional. [place] [address] any warranty or liability for your use of this information.

## 2018-11-10 NOTE — ED NOTES
Patient asked to come back by provider due to abnormal lab results. Discussed with patient the updated plan of care. Patient with no changes since his ER visit from earlier today. Emergency Department Nursing Plan of Care The Nursing Plan of Care is developed from the Nursing assessment and Emergency Department Attending provider initial evaluation. The plan of care may be reviewed in the ED Provider note. The Plan of Care was developed with the following considerations:  
Patient / Family readiness to learn indicated by:verbalized understanding Persons(s) to be included in education: patient Barriers to Learning/Limitations:No 
 
Signed 707 BRANT Wheeler RN   
11/10/2018   3:42 PM
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
## 2018-11-10 NOTE — DISCHARGE INSTRUCTIONS
High Blood Pressure: Care Instructions  Your Care Instructions    If your blood pressure is usually above 130/80, you have high blood pressure, or hypertension. That means the top number is 130 or higher or the bottom number is 80 or higher, or both. Despite what a lot of people think, high blood pressure usually doesn't cause headaches or make you feel dizzy or lightheaded. It usually has no symptoms. But it does increase your risk for heart attack, stroke, and kidney or eye damage. The higher your blood pressure, the more your risk increases. Your doctor will give you a goal for your blood pressure. Your goal will be based on your health and your age. Lifestyle changes, such as eating healthy and being active, are always important to help lower blood pressure. You might also take medicine to reach your blood pressure goal.  Follow-up care is a key part of your treatment and safety. Be sure to make and go to all appointments, and call your doctor if you are having problems. It's also a good idea to know your test results and keep a list of the medicines you take. How can you care for yourself at home? Medical treatment  · If you stop taking your medicine, your blood pressure will go back up. You may take one or more types of medicine to lower your blood pressure. Be safe with medicines. Take your medicine exactly as prescribed. Call your doctor if you think you are having a problem with your medicine. · Talk to your doctor before you start taking aspirin every day. Aspirin can help certain people lower their risk of a heart attack or stroke. But taking aspirin isn't right for everyone, because it can cause serious bleeding. · See your doctor regularly. You may need to see the doctor more often at first or until your blood pressure comes down. · If you are taking blood pressure medicine, talk to your doctor before you take decongestants or anti-inflammatory medicine, such as ibuprofen.  Some of these medicines can raise blood pressure. · Learn how to check your blood pressure at home. Lifestyle changes  · Stay at a healthy weight. This is especially important if you put on weight around the waist. Losing even 10 pounds can help you lower your blood pressure. · If your doctor recommends it, get more exercise. Walking is a good choice. Bit by bit, increase the amount you walk every day. Try for at least 30 minutes on most days of the week. You also may want to swim, bike, or do other activities. · Avoid or limit alcohol. Talk to your doctor about whether you can drink any alcohol. · Try to limit how much sodium you eat to less than 2,300 milligrams (mg) a day. Your doctor may ask you to try to eat less than 1,500 mg a day. · Eat plenty of fruits (such as bananas and oranges), vegetables, legumes, whole grains, and low-fat dairy products. · Lower the amount of saturated fat in your diet. Saturated fat is found in animal products such as milk, cheese, and meat. Limiting these foods may help you lose weight and also lower your risk for heart disease. · Do not smoke. Smoking increases your risk for heart attack and stroke. If you need help quitting, talk to your doctor about stop-smoking programs and medicines. These can increase your chances of quitting for good. When should you call for help? Call 911 anytime you think you may need emergency care. This may mean having symptoms that suggest that your blood pressure is causing a serious heart or blood vessel problem. Your blood pressure may be over 180/120.   For example, call 911 if:    · You have symptoms of a heart attack. These may include:  ? Chest pain or pressure, or a strange feeling in the chest.  ? Sweating. ? Shortness of breath. ? Nausea or vomiting. ? Pain, pressure, or a strange feeling in the back, neck, jaw, or upper belly or in one or both shoulders or arms. ? Lightheadedness or sudden weakness.   ? A fast or irregular heartbeat.     · You have symptoms of a stroke. These may include:  ? Sudden numbness, tingling, weakness, or loss of movement in your face, arm, or leg, especially on only one side of your body. ? Sudden vision changes. ? Sudden trouble speaking. ? Sudden confusion or trouble understanding simple statements. ? Sudden problems with walking or balance. ? A sudden, severe headache that is different from past headaches.     · You have severe back or belly pain.    Do not wait until your blood pressure comes down on its own. Get help right away.   Call your doctor now or seek immediate care if:    · Your blood pressure is much higher than normal (such as 180/120 or higher), but you don't have symptoms.     · You think high blood pressure is causing symptoms, such as:  ? Severe headache.  ? Blurry vision.    Watch closely for changes in your health, and be sure to contact your doctor if:    · Your blood pressure measures higher than your doctor recommends at least 2 times. That means the top number is higher or the bottom number is higher, or both.     · You think you may be having side effects from your blood pressure medicine. Where can you learn more? Go to http://nicholas-stella.info/. Enter H732 in the search box to learn more about \"High Blood Pressure: Care Instructions. \"  Current as of: December 6, 2017  Content Version: 11.8  © 1448-1332 [employer]. Care instructions adapted under license by [employer] (which disclaims liability or warranty for this information). If you have questions about a medical condition or this instruction, always ask your healthcare professional. [place] [address] any warranty or liability for your use of this information. Jaundice: Care Instructions  Your Care Instructions    Jaundice is yellowing of your skin and the whites of your eyes. It's caused by a pigment, or coloring, called bilirubin.  This comes from the breakdown of red blood cells. When your liver is healthy, it removes the pigment from your blood. But if the liver isn't working right, the pigment can build up in the blood. Then it can get into the skin and other tissues. Many diseases can cause jaundice. These include hepatitis, gallstones, and cancer of the pancreas. Liver damage from heavy drinking over a long time can also cause it. Some medicines that can damage the liver also cause jaundice. The treatment for jaundice depends on the cause. You may need medicine to treat an infection. Or you may need to have your gallbladder removed. Some people need to stop drinking alcohol. If another disease is causing jaundice, treating the disease will cure the jaundice. If a medicine you take is causing jaundice, your doctor may switch you to another one. Follow-up care is a key part of your treatment and safety. Be sure to make and go to all appointments, and call your doctor if you are having problems. It's also a good idea to know your test results and keep a list of the medicines you take. How can you care for yourself at home? · Do not drink any alcohol until your jaundice is gone. Alcohol will damage the liver more. If your jaundice is caused by drinking alcohol, do not drink alcohol even when you are better. Tell your doctor if you need help to quit. Counseling, support groups, and sometimes medicines can help you stay sober. · Be safe with medicines. Do not take any other medicine without talking to your doctor first. This includes over-the-counter medicines, vitamins, and herbal products. · Make sure your doctor knows all the medicines you take. Some medicines, such as acetaminophen (Tylenol), can make liver problems worse. · If you have itchy skin, keep cool and stay out of the sun. Try to wear cotton clothing. Talk to your doctor about using over-the-counter medicines for itching. These include diphenhydramine (Benadryl) and chlorpheniramine (Chlor-Trimeton). Follow the instructions on the label. · Lower your activity to match your energy. When should you call for help? Call 911 anytime you think you may need emergency care. For example, call if:    · You have severe trouble breathing.     · You passed out (lost consciousness).    Call your doctor now or seek immediate medical care if:    · You are confused, or your confusion gets worse.     · You have a fever or chills.     · You have severe pain or swelling in your belly.     · You are losing weight without trying.     · You are vomiting or feel sick to your stomach.     · Your urine is dark yellow-brown, or your stools are light-colored.    Watch closely for changes in your health, and be sure to contact your doctor if:    · You do not get better as expected. Where can you learn more? Go to http://nicholas-stella.info/. Enter O120 in the search box to learn more about \"Jaundice: Care Instructions. \"  Current as of: March 28, 2018  Content Version: 11.8  © 9446-1249 [employer]. Care instructions adapted under license by [employer] (which disclaims liability or warranty for this information). If you have questions about a medical condition or this instruction, always ask your healthcare professional. [address] any warranty or liability for your use of this information. Accidental Overdose of Medicine: Care Instructions  Your Care Instructions    Almost any medicine can cause harm if you take too much of it. You have had treatment to help your body get rid of an overdose of a medicine. This may have been an over-the-counter medicine. Or it might be one that a doctor prescribed. It may even have been a vitamin or a supplement. During treatment, the doctor may have given you fluids and medicine. You also may have had lab tests. Then the doctor made sure that you were well enough to go home.   The doctor has checked you carefully, but problems can develop later. If you notice any problems or new symptoms, get medical treatment right away. Follow-up care is a key part of your treatment and safety. Be sure to make and go to all appointments, and call your doctor if you are having problems. It's also a good idea to know your test results and keep a list of the medicines you take. How can you care for yourself at home? Home care  · Drink plenty of fluids. If you have kidney, heart, or liver disease and have to limit fluids, talk with your doctor before you increase the amount of fluids you drink. · If you normally take medicines, ask your doctor when you can start taking them again. · Read the information that comes with any medicine. If you have questions, ask your doctor or pharmacist.  Prevention  · Be safe with medicines. Take your medicines exactly as prescribed or as the label directs. Call your doctor if you think you are having a problem with your medicine. · Keep your medicines in the containers they came in. Keep them with the original labels. · Find out what to do if you miss a dose of your medicine. When should you call for help? Call 911 anytime you think you may need emergency care. For example, call if:    · You passed out (lost consciousness).     · You have trouble breathing.     · You are sleepy or hard to wake up.   [facility] your doctor now or seek immediate medical care if:    · You are vomiting.     · You have a new or worse headache.     · You are dizzy or lightheaded or feel like you may faint.    Watch closely for changes in your health, and be sure to contact your doctor if:    · You do not get better as expected. Where can you learn more? Go to http://nicholas-stella.info/. Enter C165 in the search box to learn more about \"Accidental Overdose of Medicine: Care Instructions. \"  Current as of: March 29, 2018  Content Version: 11.8  © 9482-0886 [employer].  Care instructions adapted under license by [facility] [address] (which disclaims liability or warranty for this information). If you have questions about a medical condition or this instruction, always ask your healthcare professional. [address] any warranty or liability for your use of this information. Liver Function Tests: About These Tests  What is it? Liver function tests check how well your liver is working. Some tests measure the amount of certain enzymes in your blood to check whether the liver is damaged or inflamed. Other tests measure how well the liver can make important proteins or clear waste products from the body. Your doctor will use the test results to help look for certain conditions, such as hepatitis, cirrhosis, or gallbladder problems. Test results that are not normal do not always mean there is a problem with your liver. Your doctor can determine if there is a problem based on your results. The tests may include:  · Alkaline phosphatase (ALP). This test measures the amount of the enzyme ALP in your blood. · Total protein. A total serum protein test measures the amounts of two major groups of proteins in your blood (albumin and globulin) and the total amount of protein. · Bilirubin. This test measures the amount of bilirubin in your blood. When bilirubin levels are high, the skin and whites of the eyes may appear yellow (jaundice). This may be caused by liver disease. · Aspartate aminotransferase (AST) and alanine aminotransferase (ALT). These tests measure the amount of the enzymes AST and ALT in your blood. (Aminotransferase is also known as a transaminase. High levels may be called transaminitis.)  Why is this test done? Liver function tests check how well your liver is working. Some tests help show whether your liver is damaged or inflamed. Your doctor may order liver function tests if you have symptoms of liver disease.   These tests also may be done to see how well a treatment for liver disease is working. How can you prepare for the test?  In general, you do not need to prepare before having these tests. Your doctor may give you some specific instructions to prepare. What happens during the test?  A health professional takes a sample of your blood. What happens after the test?  You will probably be able to go home right away. When should you call for help? Watch closely for changes in your health, and be sure to contact your doctor if you have any problems. Follow-up care is a key part of your treatment and safety. Be sure to make and go to all appointments, and call your doctor if you are having problems. It's also a good idea to keep a list of the medicines you take. Ask your doctor when you can expect to have your test results. Where can you learn more? Go to http://nicholas-stella.info/. Enter H375 in the search box to learn more about \"Liver Function Tests: About These Tests. \"  Current as of: June 26, 2018  Content Version: 11.8  © 2338-5145 [employer]. Care instructions adapted under license by [employer] (which disclaims liability or warranty for this information). If you have questions about a medical condition or this instruction, always ask your healthcare professional. [address] any warranty or liability for your use of this information.

## 2018-11-11 ENCOUNTER — DOCUMENTATION ONLY (OUTPATIENT)
Dept: NEUROLOGY | Age: 53
End: 2018-11-11
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
## 2018-11-12 PROBLEM — N40.0 ENLARGED PROSTATE WITHOUT LOWER URINARY TRACT SYMPTOMS (LUTS): Status: ACTIVE | Noted: 2018-11-12

## 2018-11-13 ENCOUNTER — OFFICE VISIT (OUTPATIENT)
Dept: NEUROLOGY | Age: 53
End: 2018-11-13

## 2018-11-13 VITALS
SYSTOLIC BLOOD PRESSURE: 117 MMHG | TEMPERATURE: 99.1 F | BODY MASS INDEX: 28.06 KG/M2 | DIASTOLIC BLOOD PRESSURE: 69 MMHG | OXYGEN SATURATION: 97 % | HEART RATE: 68 BPM | RESPIRATION RATE: 16 BRPM | WEIGHT: 168.4 LBS | HEIGHT: 65 IN

## 2018-11-13 DIAGNOSIS — R17 ICTERUS: ICD-10-CM

## 2018-11-13 DIAGNOSIS — K76.9 HEPATIC DYSFUNCTION: ICD-10-CM

## 2018-11-13 DIAGNOSIS — G40.209 PARTIAL SYMPTOMATIC EPILEPSY WITH COMPLEX PARTIAL SEIZURES, NOT INTRACTABLE, WITHOUT STATUS EPILEPTICUS (HCC): ICD-10-CM

## 2018-11-13 DIAGNOSIS — G40.409 EPILEPSY WITH GTCS (GENERALIZED TONIC CLONIC SEIZURES) ON AWAKENING (HCC): Primary | ICD-10-CM

## 2018-11-13 DIAGNOSIS — E55.9 VITAMIN D DEFICIENCY: ICD-10-CM

## 2018-11-13 NOTE — PROGRESS NOTES
Neurology Progress Note NAME:  Nicanor Bo :   1965 MRN:   [id number] Date/Time:  2018 Subjective:  
 Nicanor Bo is a 48 y.o. male here today for follow-up for seizure disorder, emergency room visit. [facility] Patient says he had only one seizure since last visit, he had to go to the emergency room because about a week ago, he noticed that his eyes were turning yellow, there was no associated pain no weakness, initially he thought it to go away but when it continued to persist patient and his partner had to go to the emergency room to check it out. Patient was in the emergency room on 2018. In the emergency room, liver function tests was done and the enzymes were elevated. At that time was called, as the Depakote level was also elevated at 137, I stopped the Depakote and adjusted the dose of Keppra. No seizure was reported. Today patient has no complaints except for the yellowness of the eyes. Patient denies excessive drinking, according to him he rarely drinks and when he drinks so he only drinks beer not hard liquor. Patient denies neck pain, chest pain, dysphagia, odynophagia, constipation or diarrhea. At this time I will continue patient only on Keppra, vitamin D, I will repeat liver enzymes with ammonia level and Depakote level, patient is already been referred to GI by the ER physician. I will call patient with the results of the test. 
Review of Systems - General ROS: positive for  - fatigue Psychological ROS: positive for - anxiety, concentration difficulties and depression Ophthalmic ROS: positive for - decreased vision ENT ROS: positive for -icterus Allergy and Immunology ROS: negative Hematological and Lymphatic ROS: negative Endocrine ROS: negative Respiratory ROS: no cough, shortness of breath, or wheezing Cardiovascular ROS: no chest pain or dyspnea on exertion Gastrointestinal ROS: no abdominal pain, change in bowel habits, or black or bloody stools Genito-Urinary ROS: no dysuria, trouble voiding, or hematuria Musculoskeletal ROS: positive for - joint pain, joint stiffness, joint swelling, muscle pain and muscular weakness Neurological ROS: positive for - dizziness, headaches, impaired coordination/balance, seizures, visual changes and weakness Dermatological ROS: negative Medications reviewed: 
Current Outpatient Medications Medication Sig Dispense Refill  levETIRAcetam (KEPPRA) 750 mg tablet Take 2 Tabs by mouth two (2) times a day for 7 days. 28 Tab 0  
 apixaban (ELIQUIS) 5 mg tablet Take 1 Tab by mouth two (2) times a day. Indications: DEEP VEIN THROMBOSIS PREVENTION 60 Tab 11  
 ergocalciferol (ERGOCALCIFEROL) 50,000 unit capsule Take 1 Cap by mouth every seven (7) days. 4 Cap 3  
 lisinopril (PRINIVIL, ZESTRIL) 10 mg tablet TAKE 1 TABLET EVERY DAY 90 Tab 2  
 tadalafil (CIALIS) 5 mg tablet Take 1 Tab by mouth as needed. 30 Tab 0 Objective:  
Vitals: 
Vitals:  
 11/13/18 7968 BP: 117/69 Pulse: 68 Resp: 16 Temp: 99.1 °F (37.3 °C) TempSrc: Temporal  
SpO2: 97% Weight: 168 lb 6.4 oz (76.4 kg) Height: 5' 5\" (1.651 m) PainSc:   0 - No pain Lab Data Reviewed: 
Lab Results Component Value Date/Time WBC 7.3 11/10/2018 12:11 PM  
 HCT 44.7 11/10/2018 12:11 PM  
 HGB 15.7 11/10/2018 12:11 PM  
 PLATELET 929 54/06/3114 12:11 PM  
 
 
Lab Results Component Value Date/Time Sodium 137 11/10/2018 12:11 PM  
 Potassium 4.1 11/10/2018 12:11 PM  
 Chloride 99 11/10/2018 12:11 PM  
 CO2 29 11/10/2018 12:11 PM  
 Glucose 92 11/10/2018 12:11 PM  
 BUN 10 11/10/2018 12:11 PM  
 Creatinine 0.90 11/10/2018 12:11 PM  
 Calcium 8.5 11/10/2018 12:11 PM  
 
 
No components found for: Lavena Hams No results found for: NITESH Lab Results Component Value Date/Time Hemoglobin A1c 4.9 02/21/2017 02:09 PM  
  
 
Lab Results Component Value Date/Time  Vitamin B12 903 08/09/2018 01:18 PM  
 Folate 7.2 2015 08:41 AM  
 
 
No results found for: NITESHFarrahdene Apgar Lab Results Component Value Date/Time Cholesterol, total 241 (H) 2018 11:55 AM  
 HDL Cholesterol 54 2018 11:55 AM  
 LDL, calculated 107 (H) 2018 11:55 AM  
 VLDL, calculated 80 (H) 2018 11:55 AM  
 Triglyceride 398 (H) 2018 11:55 AM  
 
 
 
CT Results (recent): 
Results from Hospital Encounter encounter on 11/10/18 CT ABD PELV W CONT Narrative EXAM:  CT ABD PELV W CONT INDICATION: painless jaundice COMPARISON: None CONTRAST:  100 mL of Isovue-370. TECHNIQUE:  
Following the uneventful intravenous administration of contrast, thin axial 
images were obtained through the abdomen and pelvis. Coronal and sagittal 
reconstructions were generated. Oral contrast was not administered. CT dose 
reduction was achieved through use of a standardized protocol tailored for this 
examination and automatic exposure control for dose modulation. FINDINGS:  
LUNG BASES: Clear. INCIDENTALLY IMAGED HEART AND MEDIASTINUM: Unremarkable. LIVER: No mass or biliary dilatation. GALLBLADDER: Contracted. No stones identified. SPLEEN: No mass. PANCREAS: No mass or ductal dilatation. ADRENALS: Unremarkable. KIDNEYS: 2 mm nonobstructing stone upper pole left kidney. Bilateral duplicated 
collecting systems are noted. No mass or hydronephrosis. STOMACH: Unremarkable. SMALL BOWEL: No dilatation or wall thickening. COLON: No dilatation or wall thickening. APPENDIX: Unremarkable. PERITONEUM: No ascites or pneumoperitoneum. RETROPERITONEUM: No lymphadenopathy or aortic aneurysm. Trace atheromatous 
calcifications. Infrarenal IVC filter noted. REPRODUCTIVE ORGANS: Enlarged, heterogeneous prostate gland. URINARY BLADDER: No mass or calculus. BONES: No destructive bone lesion. ADDITIONAL COMMENTS: Small fat-containing left inguinal hernia.  
  
 Impression IMPRESSION: 
 
 1. Normal-appearing liver and pancreas. No biliary ductal dilatation. 2. Enlarged, heterogeneously enhancing prostate gland. Correlate with PSA 
values. 3. 2 mm nonobstructing stone upper pole left kidney. Bilateral duplicated 
collecting systems. No hydronephrosis. MRI Results (recent): 
Results from Abstract encounter on 06/26/15 MRI BRAIN W WO CONT  
 
 
IR Results (recent): No results found for this or any previous visit. VAS/US Results (recent): 
Results from Hospital Encounter encounter on 06/09/16 DUPLEX LOWER EXT VENOUS RIGHT  
 
 
PHYSICAL EXAM: 
General:    Alert, cooperative, no distress, appears stated age. Head:   Normocephalic, without obvious abnormality, atraumatic. Eyes:   Conjunctivae/corneas clear. PERRLA Nose:  Nares normal. No drainage or sinus tenderness. Throat:    Lips, mucosa, and tongue normal.  No Thrush Neck:  Supple, symmetrical,  no adenopathy, thyroid: non tender 
  no carotid bruit and no JVD. Back:    Symmetric,  No CVA tenderness. Lungs:   Clear to auscultation bilaterally. No Wheezing or Rhonchi. No rales. Chest wall:  No tenderness or deformity. No Accessory muscle use. Heart:   Regular rate and rhythm,  no murmur, rub or gallop. Abdomen:   Soft, non-tender. Not distended. Bowel sounds normal. No masses Extremities: Extremities normal, atraumatic, No cyanosis. No edema. No clubbing Skin:     Texture, turgor normal. No rashes or lesions. Not Jaundiced Lymph nodes: Cervical, supraclavicular normal. 
Psych:  Good insight. Not depressed. Not anxious or agitated. NEUROLOGICAL EXAM: 
Appearance: The patient is well developed, well nourished, provides a coherent history and is in no acute distress. Mental Status: Oriented to time, place and person. Mood and affect appropriate. Cranial Nerves:   Intact visual fields. Fundi are benign. DANNY, EOM's full, no nystagmus, no ptosis.  Facial sensation is normal. Corneal reflexes are intact. Facial movement is symmetric. Hearing is normal bilaterally. Palate is midline with normal sternocleidomastoid and trapezius muscles are normal. Tongue is midline. Motor:  5/5 strength in upper and lower proximal and distal muscles. Normal bulk and tone. No fasciculations. Reflexes:   Deep tendon reflexes 2+/4 and symmetrical.  
Sensory:    Equivocal sensation to touch, pinprick and vibration. Gait:  Normal gait. Tremor:    Mild tremor noted. Cerebellar:  No cerebellar signs present. Neurovascular:  Normal heart sounds and regular rhythm, peripheral pulses intact, and no carotid bruits. Assesment 1. Epilepsy with GTCS (generalized tonic clonic seizures) on awakening ([address] Continue with Keppra 1500 mg twice daily 2. Icterus Blood for Depakote level, ammonia, and liver function tests 3. Partial symptomatic epilepsy with complex partial seizures, not intractable, without status epilepticus ([address] Continue Keppra 4. Hepatic dysfunction Ammonia level, liver enzymes. ___________________________________________________ PLAN: Medication and management reviewed with patient ICD-10-CM ICD-9-CM 1. Epilepsy with GTCS (generalized tonic clonic seizures) on awakening ([facility]) G40.409 345.10   
2. Icterus R17 782.4 HEPATIC FUNCTION PANEL  
   AMMONIA  
   VALPROIC ACID 3. Partial symptomatic epilepsy with complex partial seizures, not intractable, without status epilepticus ([address] G40.209 345.40 4. Hepatic dysfunction K76.9 573.9 HEPATIC FUNCTION PANEL  
   AMMONIA  
   VALPROIC ACID 5. Vitamin D deficiency E55.9 268.9 Follow-up Disposition: 
Return in about 2 months (around 1/13/2019). 
 
 
 
  
___________________________________________________ Total time spent with patient:  []15   []25   []35   [] __ minutes Care Plan discussed with: 
  [x]Patient   []Family    []Care Manager   []Consultant/Specialist : 
 
 ___________________________________________________ Attending Physician: Jorge Luis Nunn MD

## 2018-11-13 NOTE — PROGRESS NOTES
Chief Complaint Patient presents with  Seizure [facility] Follow Up 1. Have you been to the ER, urgent care clinic since your last visit? Hospitalized since your last visit? [facility]AY[facility] 11/10/18 2. Have you seen or consulted any other health care providers outside of the [address] [place] [address] since your last visit? Include any pap smears or colon screening.  no

## 2018-11-14 LAB
ALBUMIN SERPL-MCNC: 3.7 G/DL (ref 3.5–5.5)
ALP SERPL-CCNC: 125 IU/L (ref 39–117)
ALT SERPL-CCNC: 257 IU/L (ref 0–44)
AMMONIA PLAS-MCNC: 150 UG/DL (ref 27–102)
AST SERPL-CCNC: 491 IU/L (ref 0–40)
BILIRUB DIRECT SERPL-MCNC: 7.92 MG/DL (ref 0–0.4)
BILIRUB SERPL-MCNC: 10.4 MG/DL (ref 0–1.2)
PROT SERPL-MCNC: 6.6 G/DL (ref 6–8.5)
VALPROATE SERPL-MCNC: 29 UG/ML (ref 50–100)

## 2018-12-14 RX ORDER — ERGOCALCIFEROL 1.25 MG/1
50000 CAPSULE ORAL
Qty: 4 CAP | Refills: 3 | Status: SHIPPED | OUTPATIENT
Start: 2018-12-14 | End: 2019-05-08 | Stop reason: SDUPTHER

## 2019-01-23 ENCOUNTER — OFFICE VISIT (OUTPATIENT)
Dept: INTERNAL MEDICINE CLINIC | Age: 54
End: 2019-01-23

## 2019-01-23 VITALS
TEMPERATURE: 97.8 F | HEART RATE: 97 BPM | OXYGEN SATURATION: 96 % | WEIGHT: 173 LBS | SYSTOLIC BLOOD PRESSURE: 138 MMHG | DIASTOLIC BLOOD PRESSURE: 79 MMHG | BODY MASS INDEX: 28.82 KG/M2 | HEIGHT: 65 IN | RESPIRATION RATE: 18 BRPM

## 2019-01-23 DIAGNOSIS — I10 ESSENTIAL HYPERTENSION: Primary | ICD-10-CM

## 2019-01-23 DIAGNOSIS — R74.8 ELEVATED LIVER ENZYMES: ICD-10-CM

## 2019-01-23 DIAGNOSIS — Z12.11 COLON CANCER SCREENING: ICD-10-CM

## 2019-01-23 DIAGNOSIS — Z79.01 ANTICOAGULANT LONG-TERM USE: ICD-10-CM

## 2019-01-23 DIAGNOSIS — I82.409 RECURRENT DEEP VEIN THROMBOSIS (DVT) (HCC): ICD-10-CM

## 2019-01-23 PROBLEM — Z71.89 ADVANCE DIRECTIVE DISCUSSED WITH PATIENT: Status: RESOLVED | Noted: 2017-06-19 | Resolved: 2019-01-23

## 2019-01-23 NOTE — PROGRESS NOTES
Quentin Jurado is a 48 y.o. male and presents with Hypertension Rita Napoles Subjective: 
 
 
 
PMH: 
 
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 Pt has no complaints. Taking his medication daily - 
BP Readings from Last 3 Encounters:  
01/23/19 138/79  
11/13/18 117/69  
11/10/18 (!) 145/94 Pt did labs w neurologist which demonstrated elevated lfts. 11/18. Review of Systems Constitutional: negative for fevers, chills, anorexia and weight loss Respiratory:  negative for cough, hemoptysis, dyspnea,wheezing CV:   negative for chest pain, palpitations, lower extremity edema GI:   negative for nausea, vomiting, diarrhea, abdominal pain,melena Integument:  negative for rash and pruritus Hematologic:  negative for easy bruising and gum/nose bleeding Musculoskel: negative for myalgias, arthralgias, back pain, muscle weakness, joint pain Neurological:  negative for headaches, dizziness, vertigo, memory problems and gait Behavl/Psych: negative for feelings of anxiety, depression, mood changes Past Medical History:  
Diagnosis Date  Anxiety disorder  Depression  DVT (deep venous thrombosis) ([facility] [address] 7/2/2013  Fatigue  Presence of IVC filter  Pulmonary embolism ([facility] [address] 7/4/2013  Seizures ([facility] [address]   
 monthly seizures- sometimes several per month- managed by PCP at this time, per caregiver  Trauma   
 hit in head wiht board playing dominos Past Surgical History:  
Procedure Laterality Date  HX CRANIOTOMY  2006 Trauma repair  VASCULAR SURGERY PROCEDURE UNLIST  2013 IVC filter Social History Socioeconomic History  Marital status: SINGLE Spouse name: Not on file  Number of children: Not on file  Years of education: Not on file  Highest education level: Not on file Tobacco Use  Smoking status: Never Smoker  Smokeless tobacco: Never Used Substance and Sexual Activity  Alcohol use: Yes Alcohol/week: 5.0 oz Types: 10 Cans of beer per week  Drug use: No  
 Sexual activity: Yes  
  Partners: Female Other Topics Concern   Service No  
 Blood Transfusions No  
 Caffeine Concern No  
 Occupational Exposure No  
 Hobby Hazards No  
 Sleep Concern No  
 Stress Concern No  
 Weight Concern No  
 Special Diet No  
 Back Care No  
 Exercise No  
 Bike Helmet No  
 Seat Belt No  
 Self-Exams No  
Social History Narrative ** Merged History Encounter **  
    
 ** Data from: 13 Enc Dept: [address]   
    
 ** Data from: 7/3/13 Enc Dept: [place] [address] [place] native. 10th Grade education from [employer]\" program.  
 Has a total of 8 brothers and sisters. Single with gilrfriend and 4 children. Hobbies: fishing. Family History Problem Relation Age of Onset  Alcohol abuse Father 21  of hepatic cirrhosis in his 45s. Was drinking 4-5 bottles of wine daily.  Seizures Father  Hypertension Mother 61  
 COPD Mother  Sleep Apnea Mother Current Outpatient Medications Medication Sig Dispense Refill  ergocalciferol (ERGOCALCIFEROL) 50,000 unit capsule TAKE 1 CAP BY MOUTH EVERY SEVEN (7) DAYS. 4 Cap 3  
 apixaban (ELIQUIS) 5 mg tablet Take 1 Tab by mouth two (2) times a day. Indications: DEEP VEIN THROMBOSIS PREVENTION 60 Tab 11  
 lisinopril (PRINIVIL, ZESTRIL) 10 mg tablet TAKE 1 TABLET EVERY DAY 90 Tab 2  
 tadalafil (CIALIS) 5 mg tablet Take 1 Tab by mouth as needed. 30 Tab 0 No Known Allergies Objective: 
Visit Vitals /79 (BP 1 Location: Left arm, BP Patient Position: Sitting) Pulse 97 Temp 97.8 °F (36.6 °C) (Oral) Resp 18 Ht 5' 5\" (1.651 m) Wt 173 lb (78.5 kg) SpO2 96% BMI 28.79 kg/m² Physical Exam: General appearance - alert, well appearing, and in no distress. Pleasant Mental status - alert, oriented to person, place, and time EYE-EOMI EsMouth - poor dentition Neck - supple, no significant adenopathy Chest - clear to auscultation, no wheezes, rales or rhonchi, symmetric air entry Heart - normal rate, regular rhythm, normal S1, S2 Abdomen - soft, nontender, nondistended, no masses or organomegaly Ext-peripheral pulses normal, no pedal edema, no clubbing or cyanosis Neuro -alert, oriented, normal speech, no focal findings or movement disorder noted Results for orders placed or performed in visit on 11/13/18 HEPATIC FUNCTION PANEL Result Value Ref Range Protein, total 6.6 6.0 - 8.5 g/dL Albumin 3.7 3.5 - 5.5 g/dL Bilirubin, total 10.4 (H) 0.0 - 1.2 mg/dL Bilirubin, direct 7.92 (H) 0.00 - 0.40 mg/dL Alk. phosphatase 125 (H) 39 - 117 IU/L  
 AST (SGOT) 491 (H) 0 - 40 IU/L  
 ALT (SGPT) 257 (H) 0 - 44 IU/L AMMONIA Result Value Ref Range Ammonia, Plasma 150 (HH) 27 - 102 ug/dL VALPROIC ACID Result Value Ref Range Valproic acid 29 (L) 50 - 100 ug/mL Assessment/Plan: ICD-10-CM ICD-9-CM 1. Essential hypertension I10 401.9 2. Recurrent deep vein thrombosis (DVT) (HCC) I82.409 453.40   
3. Anticoagulant long-term use Z79.01 V58.61   
4. Colon cancer screening Z12.11 V76.51 REFERRAL TO GASTROENTEROLOGY CANCELED: REFERRAL TO GASTROENTEROLOGY Orders Placed This Encounter 2005 [facility] Referral Priority:   Routine Referral Type:   Consultation Referral Reason:   Specialty Services Required Referral Location:   [facility] Referred to Provider:   Consuelo Simmons MD  
  Number of Visits Requested:   1 1. Essential hypertension Controlled on current regimen 2. Recurrent deep vein thrombosis (DVT) ([facility] [address] Noted 3. Anticoagulant long-term use Cont eliquis 4. Colon cancer screening - REFERRAL TO GASTROENTEROLOGY 5. Elevated liver enzymes Repeat 
- HEPATIC FUNCTION PANEL There are no Patient Instructions on file for this visit. Follow-up Disposition: 
Return in about 4 months (around 5/23/2019). I have reviewed with the patient details of the assessment and plan and all questions were answered. Relevent patient education was performed. The most recent lab findings were reviewed with the patient. An After Visit Summary was printed and given to the patient.

## 2019-01-23 NOTE — LETTER
1/23/2019 11:07 AM 
 
Mr. Kia Hermosillo [address] Mr. Dallas Ordoñez, The last labs you did with your neurologist showed your liver was inflamed. I need you to return to the lab and REPEAT this test.  I have enclosed the lab slip. I will contact you once I get the results. Feel free to contact me with any questions or concerns. Sincerely, Lucius Collins MD

## 2019-01-23 NOTE — PROGRESS NOTES
Chief Complaint Patient presents with  Hypertension 1. Have you been to the ER, urgent care clinic since your last visit? Hospitalized since your last visit? No 
 
2. Have you seen or consulted any other health care providers outside of the [address] [place] [address] since your last visit? Include any pap smears or colon screening.  No

## 2019-01-25 ENCOUNTER — OFFICE VISIT (OUTPATIENT)
Dept: NEUROLOGY | Age: 54
End: 2019-01-25

## 2019-01-25 VITALS
TEMPERATURE: 98 F | SYSTOLIC BLOOD PRESSURE: 117 MMHG | HEART RATE: 84 BPM | WEIGHT: 172.6 LBS | BODY MASS INDEX: 28.76 KG/M2 | HEIGHT: 65 IN | RESPIRATION RATE: 16 BRPM | DIASTOLIC BLOOD PRESSURE: 88 MMHG | OXYGEN SATURATION: 98 %

## 2019-01-25 DIAGNOSIS — E55.9 VITAMIN D DEFICIENCY: ICD-10-CM

## 2019-01-25 DIAGNOSIS — G40.409 EPILEPSY WITH GTCS (GENERALIZED TONIC CLONIC SEIZURES) ON AWAKENING (HCC): Primary | ICD-10-CM

## 2019-01-25 DIAGNOSIS — F79 INTELLECTUAL DISABILITY: ICD-10-CM

## 2019-01-25 DIAGNOSIS — G40.209 PARTIAL SYMPTOMATIC EPILEPSY WITH COMPLEX PARTIAL SEIZURES, NOT INTRACTABLE, WITHOUT STATUS EPILEPTICUS (HCC): ICD-10-CM

## 2019-01-25 NOTE — PROGRESS NOTES
Neurology Progress Note NAME:  Lazarus Ax :   1965 MRN:   [id number] Date/Time:  2019 Subjective:  
  
Lazarus Ax is a 48 y.o. male here today for follow-up for seizure disorder. Patient says no seizure since the last visit. Should be recalled that patient was hospitalized previously because of jaundice, and Depakote level was somewhat elevated, at that time, I discontinued the Depakote and adjusted the dose of Keppra. Reviewing patient's notes, I did not see where the patient followed up with GI because of his jaundice, and did not see follow up liver function test. 
Also reviewed patient's medication, there was no evidence the patient is taking his AED, this patient is supposed to be on Keppra due to hepatic dysfunction, however, he reported no seizures. I will obtain a follow-up liver function tests, and patient will be on Keppra 1500 mg p.o. twice daily. Patient denies neck pain, chest pain, dysphagia, odynophagia, constipation or diarrhea. Review of Systems - General ROS: positive for  - fatigue Psychological ROS: positive for - anxiety, concentration difficulties and depression Ophthalmic ROS: positive for - decreased vision ENT ROS: positive for -icterus Allergy and Immunology ROS: negative Hematological and Lymphatic ROS: negative Endocrine ROS: negative Respiratory ROS: no cough, shortness of breath, or wheezing Cardiovascular ROS: no chest pain or dyspnea on exertion Gastrointestinal ROS: no abdominal pain, change in bowel habits, or black or bloody stools Genito-Urinary ROS: no dysuria, trouble voiding, or hematuria Musculoskeletal ROS: positive for - joint pain, joint stiffness, joint swelling, muscle pain and muscular weakness Neurological ROS: positive for - dizziness, headaches, impaired coordination/balance, seizures, visual changes and weakness Dermatological ROS: negative No seizure Medications reviewed: 
Current Outpatient Medications Medication Sig Dispense Refill  ergocalciferol (ERGOCALCIFEROL) 50,000 unit capsule TAKE 1 CAP BY MOUTH EVERY SEVEN (7) DAYS. 4 Cap 3  
 apixaban (ELIQUIS) 5 mg tablet Take 1 Tab by mouth two (2) times a day. Indications: DEEP VEIN THROMBOSIS PREVENTION 60 Tab 11  
 lisinopril (PRINIVIL, ZESTRIL) 10 mg tablet TAKE 1 TABLET EVERY DAY 90 Tab 2  
 tadalafil (CIALIS) 5 mg tablet Take 1 Tab by mouth as needed. 30 Tab 0 Objective:  
Vitals: 
Vitals:  
 01/25/19 3966 BP: 117/88 Pulse: 84 Resp: 16 Temp: 98 °F (36.7 °C) TempSrc: Temporal  
SpO2: 98% Weight: 172 lb 9.6 oz (78.3 kg) Height: 5' 5\" (1.651 m) PainSc:   0 - No pain Lab Data Reviewed: 
Lab Results Component Value Date/Time WBC 7.3 11/10/2018 12:11 PM  
 HCT 44.7 11/10/2018 12:11 PM  
 HGB 15.7 11/10/2018 12:11 PM  
 PLATELET 471 39/23/3154 12:11 PM  
 
 
Lab Results Component Value Date/Time Sodium 137 11/10/2018 12:11 PM  
 Potassium 4.1 11/10/2018 12:11 PM  
 Chloride 99 11/10/2018 12:11 PM  
 CO2 29 11/10/2018 12:11 PM  
 Glucose 92 11/10/2018 12:11 PM  
 BUN 10 11/10/2018 12:11 PM  
 Creatinine 0.90 11/10/2018 12:11 PM  
 Calcium 8.5 11/10/2018 12:11 PM  
 
 
No components found for: Niki Avalos No results found for: NITESH Lab Results Component Value Date/Time Hemoglobin A1c 4.9 02/21/2017 02:09 PM  
  
 
Lab Results Component Value Date/Time Vitamin B12 903 08/09/2018 01:18 PM  
 Folate 7.2 04/20/2015 08:41 AM  
 
 
No results found for: Breana DOWLING Lab Results Component Value Date/Time Cholesterol, total 241 (H) 06/20/2018 11:55 AM  
 HDL Cholesterol 54 06/20/2018 11:55 AM  
 LDL, calculated 107 (H) 06/20/2018 11:55 AM  
 VLDL, calculated 80 (H) 06/20/2018 11:55 AM  
 Triglyceride 398 (H) 06/20/2018 11:55 AM  
 
 
 
CT Results (recent): 
Results from Hospital Encounter encounter on 11/10/18 CT ABD PELV W CONT Narrative EXAM:  CT ABD PELV W CONT INDICATION: painless jaundice COMPARISON: None CONTRAST:  100 mL of Isovue-370. TECHNIQUE:  
Following the uneventful intravenous administration of contrast, thin axial 
images were obtained through the abdomen and pelvis. Coronal and sagittal 
reconstructions were generated. Oral contrast was not administered. CT dose 
reduction was achieved through use of a standardized protocol tailored for this 
examination and automatic exposure control for dose modulation. FINDINGS:  
LUNG BASES: Clear. INCIDENTALLY IMAGED HEART AND MEDIASTINUM: Unremarkable. LIVER: No mass or biliary dilatation. GALLBLADDER: Contracted. No stones identified. SPLEEN: No mass. PANCREAS: No mass or ductal dilatation. ADRENALS: Unremarkable. KIDNEYS: 2 mm nonobstructing stone upper pole left kidney. Bilateral duplicated 
collecting systems are noted. No mass or hydronephrosis. STOMACH: Unremarkable. SMALL BOWEL: No dilatation or wall thickening. COLON: No dilatation or wall thickening. APPENDIX: Unremarkable. PERITONEUM: No ascites or pneumoperitoneum. RETROPERITONEUM: No lymphadenopathy or aortic aneurysm. Trace atheromatous 
calcifications. Infrarenal IVC filter noted. REPRODUCTIVE ORGANS: Enlarged, heterogeneous prostate gland. URINARY BLADDER: No mass or calculus. BONES: No destructive bone lesion. ADDITIONAL COMMENTS: Small fat-containing left inguinal hernia. Impression IMPRESSION: 
 
1. Normal-appearing liver and pancreas. No biliary ductal dilatation. 2. Enlarged, heterogeneously enhancing prostate gland. Correlate with PSA 
values. 3. 2 mm nonobstructing stone upper pole left kidney. Bilateral duplicated 
collecting systems. No hydronephrosis. MRI Results (recent): 
Results from Abstract encounter on 06/26/15 MRI BRAIN W WO CONT  
 
 
IR Results (recent): No results found for this or any previous visit. VAS/US Results (recent): 
Results from Hospital Encounter encounter on 06/09/16 DUPLEX LOWER EXT VENOUS RIGHT  
 
 
PHYSICAL EXAM: 
General:    Alert, cooperative, no distress, appears stated age. Head:   Normocephalic, without obvious abnormality, atraumatic. Eyes:   Conjunctivae/corneas clear. No icterus, PERRLA Nose:  Nares normal. No drainage or sinus tenderness. Throat:    Lips, mucosa, and tongue normal.  No Thrush Neck:  Supple, symmetrical,  no adenopathy, thyroid: non tender 
  no carotid bruit and no JVD. Back:    Symmetric,  No CVA tenderness. Lungs:   Clear to auscultation bilaterally. No Wheezing or Rhonchi. No rales. Chest wall:  No tenderness or deformity. No Accessory muscle use. Heart:   Regular rate and rhythm,  no murmur, rub or gallop. Abdomen:   Soft, non-tender. Not distended. Bowel sounds normal. No masses Extremities: Extremities normal, atraumatic, No cyanosis. No edema. No clubbing Skin:     Texture, turgor normal. No rashes or lesions. Not Jaundiced Lymph nodes: Cervical, supraclavicular normal. 
Psych:  Good insight. Not depressed. Not anxious or agitated. NEUROLOGICAL EXAM: 
Appearance: The patient is well developed, well nourished, provides a coherent history and is in no acute distress. Mental Status: Oriented to time, place and person. Mood and affect appropriate. Cranial Nerves:   Intact visual fields. Fundi are benign. DANNY, EOM's full, no nystagmus, no ptosis. Facial sensation is normal. Corneal reflexes are intact. Facial movement is symmetric. Hearing is normal bilaterally. Palate is midline with normal sternocleidomastoid and trapezius muscles are normal. Tongue is midline. Motor:  5/5 strength in upper and lower proximal and distal muscles. Normal bulk and tone. No fasciculations. Reflexes:   Deep tendon reflexes 2+/4 and symmetrical.  
Sensory:   Normal to touch, pinprick and vibration. Gait:  Normal gait. Tremor:   No tremor noted. Cerebellar:  No cerebellar signs present. Neurovascular:  Normal heart sounds and regular rhythm, peripheral pulses intact, and no carotid bruits. Assesment 1. Epilepsy with GTCS (generalized tonic clonic seizures) on awakening ([address] Stable 2. Partial symptomatic epilepsy with complex partial seizures, not intractable, without status epilepticus ([address] Continue Keppra 1500 mg p.o. twice daily 3. Intellectual disability Stable 4. Vitamin D deficiency Replace vitamin D 
 
___________________________________________________ PLAN: Medication and plan discussed with patient ICD-10-CM ICD-9-CM 1. Epilepsy with GTCS (generalized tonic clonic seizures) on awakening ([facility]) G40.409 345.10 2. Partial symptomatic epilepsy with complex partial seizures, not intractable, without status epilepticus ([address] G40.209 345.40   
3. Intellectual disability F79 319   
4. Vitamin D deficiency E55.9 268.9 Follow-up Disposition: 
Return in about 6 months (around 7/25/2019). 
 
 
 
  
___________________________________________________ Total time spent with patient:  []15   []25   []35   [] __ minutes Care Plan discussed with: 
  []Patient   []Family    []Care Manager   []Consultant/Specialist : 
 
___________________________________________________ Attending Physician: Marsha Dover MD

## 2019-01-25 NOTE — PROGRESS NOTES
Chief Complaint Patient presents with  Seizure 1. Have you been to the ER, urgent care clinic since your last visit? Hospitalized since your last visit? no 
 
2. Have you seen or consulted any other health care providers outside of the [address] [place] [address] since your last visit? Include any pap smears or colon screening.  no

## 2019-02-03 RX ORDER — LEVETIRACETAM 750 MG/1
1500 TABLET ORAL 2 TIMES DAILY
Qty: 120 TAB | Refills: 6 | Status: SHIPPED | OUTPATIENT
Start: 2019-02-03 | End: 2019-05-09 | Stop reason: SDUPTHER

## 2019-05-09 DIAGNOSIS — I10 ESSENTIAL HYPERTENSION: ICD-10-CM

## 2019-05-09 RX ORDER — LISINOPRIL 10 MG/1
TABLET ORAL
Qty: 90 TAB | Refills: 2 | Status: SHIPPED | OUTPATIENT
Start: 2019-05-09 | End: 2019-07-31 | Stop reason: SDUPTHER

## 2019-05-09 RX ORDER — ERGOCALCIFEROL 1.25 MG/1
50000 CAPSULE ORAL
Qty: 4 CAP | Refills: 3 | Status: SHIPPED | OUTPATIENT
Start: 2019-05-09 | End: 2019-07-31

## 2019-07-31 ENCOUNTER — OFFICE VISIT (OUTPATIENT)
Dept: INTERNAL MEDICINE CLINIC | Age: 54
End: 2019-07-31

## 2019-07-31 VITALS
RESPIRATION RATE: 19 BRPM | HEIGHT: 65 IN | DIASTOLIC BLOOD PRESSURE: 81 MMHG | OXYGEN SATURATION: 97 % | HEART RATE: 82 BPM | BODY MASS INDEX: 25.99 KG/M2 | WEIGHT: 156 LBS | TEMPERATURE: 98.1 F | SYSTOLIC BLOOD PRESSURE: 127 MMHG

## 2019-07-31 DIAGNOSIS — F10.90 HEAVY ALCOHOL USE: ICD-10-CM

## 2019-07-31 DIAGNOSIS — R74.8 ELEVATED LIVER ENZYMES: ICD-10-CM

## 2019-07-31 DIAGNOSIS — I82.409 RECURRENT DEEP VEIN THROMBOSIS (DVT) (HCC): Primary | ICD-10-CM

## 2019-07-31 DIAGNOSIS — I10 ESSENTIAL HYPERTENSION: ICD-10-CM

## 2019-07-31 DIAGNOSIS — R56.9 SEIZURES (HCC): ICD-10-CM

## 2019-07-31 DIAGNOSIS — Z79.01 ANTICOAGULANT LONG-TERM USE: ICD-10-CM

## 2019-07-31 RX ORDER — LISINOPRIL 10 MG/1
TABLET ORAL
Qty: 90 TAB | Refills: 2 | Status: SHIPPED | OUTPATIENT
Start: 2019-07-31 | End: 2020-05-08

## 2019-07-31 NOTE — PROGRESS NOTES
Cony Delcid is a 48 y.o. male and presents with Hypertension  . Subjective:    Pts last appt 1/19. 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 Pt has no complaints. Taking his medication daily   -  BP Readings from Last 3 Encounters:   07/31/19 127/81   01/25/19 117/88   01/23/19 138/79       4)Elevated LFTs-  Lab Results   Component Value Date/Time    ALT (SGPT) 257 (H) 11/13/2018 10:01 AM    AST (SGOT) 491 (H) 11/13/2018 10:01 AM    Alk.  phosphatase 125 (H) 11/13/2018 10:01 AM    Bilirubin, direct 7.92 (H) 11/13/2018 10:01 AM    Bilirubin, total 10.4 (H) 11/13/2018 10:01 AM     Pt admits to drinking ~32-48 OUNCES OF BEER DAILY    Review of Systems  Constitutional: negative for fevers, chills, anorexia and weight loss  Respiratory:  negative for cough, hemoptysis, dyspnea,wheezing  CV:   negative for chest pain, palpitations, lower extremity edema  GI:   negative for nausea, vomiting, diarrhea, abdominal pain,melena  Integument:  negative for rash and pruritus  Hematologic:  negative for easy bruising and gum/nose bleeding  Musculoskel: negative for myalgias, arthralgias, back pain, muscle weakness, joint pain  Neurological:  negative for headaches, dizziness, vertigo, memory problems and gait   Behavl/Psych: negative for feelings of anxiety, depression, mood changes    Past Medical History:   Diagnosis Date    Anxiety disorder     Depression     DVT (deep venous thrombosis) ([facility] [address] 7/2/2013    Fatigue     Presence of IVC filter     Pulmonary embolism ([facility] [address] 7/4/2013    Seizures (HCC)     monthly seizures- sometimes several per month- managed by PCP at this time, per caregiver    Trauma     hit in head wiht board playing [employer]      Past Surgical History:   Procedure Laterality Date    HX CRANIOTOMY  2006    Trauma repair    VASCULAR SURGERY PROCEDURE UNLIST  2013    IVC filter     Social History     Socioeconomic History    Marital status: SINGLE     Spouse name: Not on file    Number of children: Not on file    Years of education: Not on file    Highest education level: Not on file   Tobacco Use    Smoking status: Never Smoker    Smokeless tobacco: Never Used   Substance and Sexual Activity    Alcohol use: Yes     Alcohol/week: 8.3 standard drinks     Types: 10 Cans of beer per week    Drug use: No    Sexual activity: Yes     Partners: Female   Other Topics Concern     Service No    Blood Transfusions No    Caffeine Concern No    Occupational Exposure No    Hobby Hazards No    Sleep Concern No    Stress Concern No    Weight Concern No    Special Diet No    Back Care No    Exercise No    Bike Helmet No    Seat Belt No    Self-Exams No   Social History Narrative    ** Merged History Encounter **         ** Data from: 13 Enc Dept: [address]         ** Data from: 7/3/13 Enc Dept: [facility] [employer] [place]. 10th Grade education from [employer] program.    Has a total of 8 brothers and sisters. [facility] and 4 children. Hobbies: fishing. Family History   Problem Relation Age of Onset    Alcohol abuse Father 21         of hepatic cirrhosis in his 45s. Was drinking 4-5 bottles of wine daily.  Seizures Father     Hypertension Mother 61    COPD Mother     Sleep Apnea Mother      Current Outpatient Medications   Medication Sig Dispense Refill    varicella-zoster recombinant, PF, (SHINGRIX) 50 mcg/0.5 mL susr injection 0.5 mL by IntraMUSCular route once for 1 dose. 0.5 mL 1    apixaban (ELIQUIS) 5 mg tablet Take 1 Tab by mouth two (2) times a day.  Indications: Deep Vein Thrombosis Prevention 60 Tab 11    lisinopril (PRINIVIL, ZESTRIL) 10 mg tablet TAKE 1 TABLET BY MOUTH EVERY DAY 90 Tab 2    levETIRAcetam (KEPPRA) 750 mg tablet Take 2 Tabs by mouth two (2) times a day. 120 Tab 6    tadalafil (CIALIS) 5 mg tablet Take 1 Tab by mouth as needed. 30 Tab 0     No Known Allergies    Objective:  Visit Vitals  /81 (BP 1 Location: Left arm, BP Patient Position: Sitting)   Pulse 82   Temp 98.1 °F (36.7 °C) (Oral)   Resp 19   Ht 5' 5\" (1.651 m)   Wt 156 lb (70.8 kg)   SpO2 97%   BMI 25.96 kg/m²     Physical Exam:   General appearance - alert, well appearing, and in no distress. Pleasant  Mental status - alert, oriented to person, place, and time  EYE-EOMI  EsMouth - poor dentition  Neck - supple, no significant adenopathy   Chest - clear to auscultation, no wheezes, rales or rhonchi, symmetric air entry   Heart - normal rate, regular rhythm, normal S1, S2  Abdomen - soft, nontender, nondistended, no masses or organomegaly  Ext-peripheral pulses normal, no pedal edema, no clubbing or cyanosis  Neuro -alert, oriented, normal speech, no focal findings or movement disorder noted        Results for orders placed or performed in visit on 11/13/18   HEPATIC FUNCTION PANEL   Result Value Ref Range    Protein, total 6.6 6.0 - 8.5 g/dL    Albumin 3.7 3.5 - 5.5 g/dL    Bilirubin, total 10.4 (H) 0.0 - 1.2 mg/dL    Bilirubin, direct 7.92 (H) 0.00 - 0.40 mg/dL    Alk. phosphatase 125 (H) 39 - 117 IU/L    AST (SGOT) 491 (H) 0 - 40 IU/L    ALT (SGPT) 257 (H) 0 - 44 IU/L   AMMONIA   Result Value Ref Range    Ammonia, Plasma 150 (HH) 27 - 102 ug/dL   VALPROIC ACID   Result Value Ref Range    Valproic acid 29 (L) 50 - 100 ug/mL       Assessment/Plan:    ICD-10-CM ICD-9-CM    1. Recurrent deep vein thrombosis (DVT) (HCC) I82.409 453.40 apixaban (ELIQUIS) 5 mg tablet   2. Essential hypertension I10 401.9 lisinopril (PRINIVIL, ZESTRIL) 10 mg tablet   3. Seizures ([address] R56.9 780.39    4. Elevated liver enzymes R74.8 790.5 HEPATIC FUNCTION PANEL      GGT   5.  Anticoagulant long-term use Z79.01 V58.61 apixaban (ELIQUIS) 5 mg tablet   6. Heavy alcohol use Z78.9 V49.89      Orders Placed This Encounter    HEPATIC FUNCTION PANEL    GGT    varicella-zoster recombinant, PF, (SHINGRIX) 50 mcg/0.5 mL susr injection     Si.5 mL by IntraMUSCular route once for 1 dose. Dispense:  0.5 mL     Refill:  1    apixaban (ELIQUIS) 5 mg tablet     Sig: Take 1 Tab by mouth two (2) times a day. Indications: Deep Vein Thrombosis Prevention     Dispense:  60 Tab     Refill:  11    lisinopril (PRINIVIL, ZESTRIL) 10 mg tablet     Sig: TAKE 1 TABLET BY MOUTH EVERY DAY     Dispense:  90 Tab     Refill:  2     DX Code Needed  . 1. Recurrent deep vein thrombosis (DVT) (HCC)  Cont NOAC  - apixaban (ELIQUIS) 5 mg tablet; Take 1 Tab by mouth two (2) times a day. Indications: Deep Vein Thrombosis Prevention  Dispense: 60 Tab; Refill: 11    2. Essential hypertension  Controlled on current regimen  - lisinopril (PRINIVIL, ZESTRIL) 10 mg tablet; TAKE 1 TABLET BY MOUTH EVERY DAY  Dispense: 90 Tab; Refill: 2    3. Seizures ([address]  Cont med and neurology f/u    4. Elevated liver enzymes  ? Due to excess alcohol  Recheck level and check US if still elevated  - HEPATIC FUNCTION PANEL  - GGT    5. Anticoagulant long-term use  Noted  - apixaban (ELIQUIS) 5 mg tablet; Take 1 Tab by mouth two (2) times a day. Indications: Deep Vein Thrombosis Prevention  Dispense: 60 Tab; Refill: 11    6. Heavy alcohol use  D/w pt limiting/cessation  He is PRE contemplative          There are no Patient Instructions on file for this visit. Follow-up and Dispositions    · Return in about 4 months (around 2019) for bp check. I have reviewed with the patient details of the assessment and plan and all questions were answered. Relevent patient education was performed. The most recent lab findings were reviewed with the patient. An After Visit Summary was printed and given to the patient.

## 2019-07-31 NOTE — PROGRESS NOTES
Chief Complaint   Patient presents with    Hypertension     1. Have you been to the ER, urgent care clinic since your last visit? Hospitalized since your last visit? No    2. Have you seen or consulted any other health care providers outside of the [address] [place] [address] since your last visit? Include any pap smears or colon screening.  No

## 2019-08-01 LAB
ALBUMIN SERPL-MCNC: 3.8 G/DL (ref 3.5–5.5)
ALP SERPL-CCNC: 64 IU/L (ref 39–117)
ALT SERPL-CCNC: 32 IU/L (ref 0–44)
AST SERPL-CCNC: 45 IU/L (ref 0–40)
BILIRUB DIRECT SERPL-MCNC: 0.16 MG/DL (ref 0–0.4)
BILIRUB SERPL-MCNC: 0.5 MG/DL (ref 0–1.2)
GGT SERPL-CCNC: 203 IU/L (ref 0–65)
PROT SERPL-MCNC: 6.8 G/DL (ref 6–8.5)

## 2019-08-13 ENCOUNTER — APPOINTMENT (OUTPATIENT)
Dept: GENERAL RADIOLOGY | Age: 54
End: 2019-08-13
Attending: NURSE PRACTITIONER
Payer: MEDICAID

## 2019-08-13 ENCOUNTER — HOSPITAL ENCOUNTER (EMERGENCY)
Age: 54
Discharge: HOME | End: 2019-08-13
Attending: EMERGENCY MEDICINE
Payer: MEDICAID

## 2019-08-13 VITALS
TEMPERATURE: 98.1 F | SYSTOLIC BLOOD PRESSURE: 137 MMHG | BODY MASS INDEX: 26.08 KG/M2 | HEART RATE: 97 BPM | HEIGHT: 65 IN | WEIGHT: 156.5 LBS | RESPIRATION RATE: 17 BRPM | DIASTOLIC BLOOD PRESSURE: 97 MMHG | OXYGEN SATURATION: 99 %

## 2019-08-13 DIAGNOSIS — S22.42XA CLOSED FRACTURE OF MULTIPLE RIBS OF LEFT SIDE, INITIAL ENCOUNTER: Primary | ICD-10-CM

## 2019-08-13 PROCEDURE — 93005 ELECTROCARDIOGRAM TRACING: CPT

## 2019-08-13 PROCEDURE — 99283 EMERGENCY DEPT VISIT LOW MDM: CPT

## 2019-08-13 PROCEDURE — 71111 X-RAY EXAM RIBS/CHEST4/> VWS: CPT

## 2019-08-13 RX ORDER — TRAMADOL HYDROCHLORIDE 50 MG/1
50 TABLET ORAL
Qty: 9 TAB | Refills: 0 | Status: SHIPPED | OUTPATIENT
Start: 2019-08-13 | End: 2019-08-16

## 2019-08-13 RX ORDER — ACETAMINOPHEN 500 MG
1000 TABLET ORAL
Qty: 20 TAB | Refills: 0 | Status: SHIPPED | OUTPATIENT
Start: 2019-08-13 | End: 2019-08-19 | Stop reason: SDUPTHER
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
## 2019-08-13 NOTE — DISCHARGE INSTRUCTIONS
Patient Education        Broken Rib: Care Instructions  Your Care Instructions    A broken rib is a crack or break in one of the bones of the rib cage. Breathing can be very painful because the muscles used for breathing pull on the rib. In most cases, a broken rib will heal on its own. You can take pain medicine while the rib mends. Pain relief allows you to take deep breaths. In the past, doctors recommended taping or wrapping broken ribs. This is no longer done because taping makes it hard for you to take deep breaths. Taking deep breaths may help prevent pneumonia or a partial collapse of a lung. Your rib will heal in about 6 weeks. You heal best when you take good care of yourself. Eat a variety of healthy foods, and don't smoke. Follow-up care is a key part of your treatment and safety. Be sure to make and go to all appointments, and call your doctor if you are having problems. It's also a good idea to know your test results and keep a list of the medicines you take. How can you care for yourself at home? · Be safe with medicines. Read and follow all instructions on the label. ? If the doctor gave you a prescription medicine for pain, take it as prescribed. ? If you are not taking a prescription pain medicine, ask your doctor if you can take an over-the-counter medicine. · Even if it hurts, try to cough or take the deepest breath you can at least once every hour. This will get air deeply into your lungs. This may reduce your chance of getting pneumonia or a partial collapse of a lung. Hold a pillow against your chest to make this less painful. · Put ice or a cold pack on the area for 10 to 20 minutes at a time. Put a thin cloth between the ice and your skin. When should you call for help? Call 911 anytime you think you may need emergency care.  For example, call if:    · You have severe trouble breathing.    Call your doctor now or seek immediate medical care if:    · You have some trouble breathing.     · You have a fever.     · You have a new or worse cough.    Watch closely for changes in your health, and be sure to contact your doctor if:    · You have pain even after taking your medicine.     · You do not get better as expected. Where can you learn more? Go to http://nicholas-stella.info/. Enter M135 in the search box to learn more about \"Broken Rib: Care Instructions. \"  Current as of: September 20, 2018  Content Version: 12.1  © 5010-6586 [employer]. Care instructions adapted under license by [employer] (which disclaims liability or warranty for this information). If you have questions about a medical condition or this instruction, always ask your healthcare professional. [address] any warranty or liability for your use of this information.
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
## 2019-08-14 LAB
ATRIAL RATE: 90 BPM
CALCULATED P AXIS, ECG09: 63 DEGREES
CALCULATED R AXIS, ECG10: 34 DEGREES
CALCULATED T AXIS, ECG11: 60 DEGREES
DIAGNOSIS, 93000: NORMAL
P-R INTERVAL, ECG05: 122 MS
Q-T INTERVAL, ECG07: 350 MS
QRS DURATION, ECG06: 70 MS
QTC CALCULATION (BEZET), ECG08: 428 MS
VENTRICULAR RATE, ECG03: 90 BPM

## 2019-08-19 ENCOUNTER — OFFICE VISIT (OUTPATIENT)
Dept: INTERNAL MEDICINE CLINIC | Age: 54
End: 2019-08-19

## 2019-08-19 VITALS
RESPIRATION RATE: 19 BRPM | BODY MASS INDEX: 26.33 KG/M2 | HEIGHT: 65 IN | DIASTOLIC BLOOD PRESSURE: 87 MMHG | HEART RATE: 84 BPM | SYSTOLIC BLOOD PRESSURE: 136 MMHG | OXYGEN SATURATION: 97 % | TEMPERATURE: 97.7 F | WEIGHT: 158 LBS

## 2019-08-19 DIAGNOSIS — S22.42XD CLOSED FRACTURE OF MULTIPLE RIBS OF LEFT SIDE WITH ROUTINE HEALING, SUBSEQUENT ENCOUNTER: Primary | ICD-10-CM

## 2019-08-19 RX ORDER — ERGOCALCIFEROL 1.25 MG/1
CAPSULE ORAL
Refills: 3 | COMMUNITY
Start: 2019-08-05 | End: 2019-12-02 | Stop reason: ALTCHOICE

## 2019-08-19 RX ORDER — ACETAMINOPHEN 500 MG
500-1000 TABLET ORAL
Qty: 30 TAB | Refills: 0 | Status: SHIPPED | OUTPATIENT
Start: 2019-08-19
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
## 2019-08-19 NOTE — PROGRESS NOTES
Chief Complaint   Patient presents with   Nelia Cheney ED Follow-up     8/13/2019 [facility] ED for fx rib left side     1. Have you been to the ER, urgent care clinic since your last visit? Hospitalized since your last visit? Yes When: 8/13/2019 [facility] ED for fx rib left side    2. Have you seen or consulted any other health care providers outside of the [address] [place] [address] since your last visit? Include any pap smears or colon screening.  No

## 2019-08-30 ENCOUNTER — OFFICE VISIT (OUTPATIENT)
Dept: NEUROLOGY | Age: 54
End: 2019-08-30

## 2019-08-30 VITALS
HEIGHT: 65 IN | BODY MASS INDEX: 26.82 KG/M2 | HEART RATE: 86 BPM | RESPIRATION RATE: 16 BRPM | DIASTOLIC BLOOD PRESSURE: 85 MMHG | WEIGHT: 161 LBS | TEMPERATURE: 97.7 F | OXYGEN SATURATION: 99 % | SYSTOLIC BLOOD PRESSURE: 144 MMHG

## 2019-08-30 DIAGNOSIS — R56.9 SEIZURES (HCC): ICD-10-CM

## 2019-08-30 DIAGNOSIS — G40.209 PARTIAL SYMPTOMATIC EPILEPSY WITH COMPLEX PARTIAL SEIZURES, NOT INTRACTABLE, WITHOUT STATUS EPILEPTICUS (HCC): Primary | ICD-10-CM

## 2019-08-30 DIAGNOSIS — E55.9 VITAMIN D DEFICIENCY: ICD-10-CM

## 2019-08-30 DIAGNOSIS — F79 INTELLECTUAL DISABILITY: ICD-10-CM
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
## 2019-08-31 RX ORDER — LEVETIRACETAM 750 MG/1
1500 TABLET ORAL 2 TIMES DAILY
Qty: 120 TAB | Refills: 6 | Status: SHIPPED | OUTPATIENT
Start: 2019-08-31 | End: 2020-03-02 | Stop reason: SDUPTHER

## 2019-09-30 RX ORDER — ERGOCALCIFEROL 1.25 MG/1
50000 CAPSULE ORAL
Qty: 4 CAP | Refills: 3 | Status: SHIPPED | OUTPATIENT
Start: 2019-09-30 | End: 2020-02-03

## 2019-12-02 ENCOUNTER — OFFICE VISIT (OUTPATIENT)
Dept: INTERNAL MEDICINE CLINIC | Age: 54
End: 2019-12-02

## 2019-12-02 VITALS
HEART RATE: 97 BPM | HEIGHT: 65 IN | RESPIRATION RATE: 19 BRPM | DIASTOLIC BLOOD PRESSURE: 78 MMHG | OXYGEN SATURATION: 94 % | TEMPERATURE: 98.9 F | BODY MASS INDEX: 26.82 KG/M2 | SYSTOLIC BLOOD PRESSURE: 122 MMHG | WEIGHT: 161 LBS

## 2019-12-02 DIAGNOSIS — Z79.01 LONG TERM (CURRENT) USE OF ANTICOAGULANTS: ICD-10-CM

## 2019-12-02 DIAGNOSIS — I10 ESSENTIAL HYPERTENSION: Primary | ICD-10-CM

## 2019-12-02 DIAGNOSIS — Z23 ENCOUNTER FOR IMMUNIZATION: ICD-10-CM

## 2019-12-02 DIAGNOSIS — I82.409 RECURRENT DEEP VEIN THROMBOSIS (DVT) (HCC): ICD-10-CM

## 2019-12-02 DIAGNOSIS — D17.1 LIPOMA OF ABDOMINAL WALL: ICD-10-CM

## 2019-12-02 DIAGNOSIS — R56.9 SEIZURES (HCC): ICD-10-CM

## 2019-12-02 DIAGNOSIS — Z12.11 COLON CANCER SCREENING: ICD-10-CM

## 2019-12-02 NOTE — PROGRESS NOTES
Chief Complaint   Patient presents with    Follow-up    Cyst     on left side abdomen     1. Have you been to the ER, urgent care clinic since your last visit? Hospitalized since your last visit? No    2. Have you seen or consulted any other health care providers outside of the [address] [place] [address] since your last visit? Include any pap smears or colon screening. No     After obtaining consent, and per orders of Dr. Mary Anne Wynn, injection of Influenza given by Yoly Smith LPN. Patient instructed to remain in clinic for 15 minutes afterwards, and to report any adverse reaction to me immediately.
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
## 2020-02-03 RX ORDER — ERGOCALCIFEROL 1.25 MG/1
50000 CAPSULE ORAL
Qty: 4 CAP | Refills: 3 | Status: SHIPPED | OUTPATIENT
Start: 2020-02-03 | End: 2020-03-02 | Stop reason: SDUPTHER

## 2020-03-02 ENCOUNTER — OFFICE VISIT (OUTPATIENT)
Dept: NEUROLOGY | Age: 55
End: 2020-03-02

## 2020-03-02 VITALS
WEIGHT: 166 LBS | RESPIRATION RATE: 16 BRPM | TEMPERATURE: 98.2 F | SYSTOLIC BLOOD PRESSURE: 130 MMHG | DIASTOLIC BLOOD PRESSURE: 69 MMHG | BODY MASS INDEX: 27.66 KG/M2 | OXYGEN SATURATION: 96 % | HEART RATE: 100 BPM | HEIGHT: 65 IN

## 2020-03-02 DIAGNOSIS — F79 INTELLECTUAL DISABILITY: ICD-10-CM

## 2020-03-02 DIAGNOSIS — R56.9 SEIZURES (HCC): ICD-10-CM

## 2020-03-02 DIAGNOSIS — R25.1 TREMOR: ICD-10-CM

## 2020-03-02 DIAGNOSIS — G40.209 PARTIAL SYMPTOMATIC EPILEPSY WITH COMPLEX PARTIAL SEIZURES, NOT INTRACTABLE, WITHOUT STATUS EPILEPTICUS (HCC): Primary | ICD-10-CM

## 2020-03-02 DIAGNOSIS — E55.9 VITAMIN D DEFICIENCY: ICD-10-CM

## 2020-03-02 DIAGNOSIS — N52.8 OTHER MALE ERECTILE DYSFUNCTION: ICD-10-CM

## 2020-03-02 RX ORDER — ERGOCALCIFEROL 1.25 MG/1
50000 CAPSULE ORAL
Qty: 4 CAP | Refills: 3 | Status: SHIPPED | OUTPATIENT
Start: 2020-03-02 | End: 2020-10-06 | Stop reason: SDUPTHER

## 2020-03-02 RX ORDER — LEVETIRACETAM 750 MG/1
1500 TABLET ORAL 2 TIMES DAILY
Qty: 120 TAB | Refills: 6 | Status: SHIPPED | OUTPATIENT
Start: 2020-03-02 | End: 2020-07-02 | Stop reason: SDUPTHER

## 2020-03-02 RX ORDER — TADALAFIL 10 MG/1
10 TABLET ORAL AS NEEDED
Qty: 12 TAB | Refills: 3 | Status: SHIPPED | OUTPATIENT
Start: 2020-03-02 | End: 2020-07-02 | Stop reason: SDUPTHER

## 2020-03-02 NOTE — PROGRESS NOTES
Neurology Progress Note    NAME:  Ej Guardado   :   1965   MRN:   [id number]     Date/Time:  3/2/2020  Subjective:     Ej Guardado is a 47 y.o. male here today for follow-up for seizure disorder. Patient says is to seizure since last visit, however he said very minor. Last seizure was about 2 months ago, patient noted that his medication has been helpful. He said that he has difficulty getting erection and wondering if this was the medication that he is taking. At this time, the patient it  could be for his medication or medical condition. He was given Cialis erectile dysfunction. Patient is noted to have tremors, he says the tremor has been going on for quite some time, mostly on the right hand. I will continue patient on his current medication. Patient denies neck pain, chest pain, dysphagia, odynophagia, constipation or diarrhea. Review of Systems - General ROS: positive for  - fatigue  Psychological ROS: positive for - anxiety, concentration difficulties and depression  Ophthalmic ROS: positive for - decreased vision  ENT ROS: positive for -icterus  Allergy and Immunology ROS: negative  Hematological and Lymphatic ROS: negative  Endocrine ROS: negative  Respiratory ROS: no cough, shortness of breath, or wheezing  Cardiovascular ROS: no chest pain or dyspnea on exertion  Gastrointestinal ROS: no abdominal pain, change in bowel habits, or black or bloody stools  Genito-Urinary ROS: no dysuria, trouble voiding, or hematuria  Musculoskeletal ROS: positive for - joint pain, joint stiffness, joint swelling, muscle pain and muscular weakness  Neurological ROS: positive for - dizziness, headaches, impaired coordination/balance, seizures, visual changes and weakness  Dermatological ROS: negative      Medications reviewed:  Current Outpatient Medications   Medication Sig Dispense Refill    ergocalciferol (ERGOCALCIFEROL) 1,250 mcg (50,000 unit) capsule TAKE 1 CAP BY MOUTH EVERY SEVEN (7) DAYS.  4 Cap 3    levETIRAcetam (KEPPRA) 750 mg tablet Take 2 Tabs by mouth two (2) times a day. 120 Tab 6    acetaminophen (TYLENOL EXTRA STRENGTH) 500 mg tablet Take 1-2 Tabs by mouth every eight (8) hours as needed for Pain. 30 Tab 0    apixaban (ELIQUIS) 5 mg tablet Take 1 Tab by mouth two (2) times a day. Indications: Deep Vein Thrombosis Prevention 60 Tab 11    lisinopril (PRINIVIL, ZESTRIL) 10 mg tablet TAKE 1 TABLET BY MOUTH EVERY DAY 90 Tab 2    tadalafil (CIALIS) 5 mg tablet Take 1 Tab by mouth as needed.  30 Tab 0        Objective:   Vitals:  Vitals:    03/02/20 1016   BP: 130/69   Pulse: 100   Resp: 16   Temp: 98.2 °F (36.8 °C)   TempSrc: Temporal   SpO2: 96%   Weight: 166 lb (75.3 kg)   Height: 5' 5\" (1.651 m)   PainSc:   0 - No pain               Lab Data Reviewed:  Lab Results   Component Value Date/Time    WBC 7.3 11/10/2018 12:11 PM    HCT 44.7 11/10/2018 12:11 PM    HGB 15.7 11/10/2018 12:11 PM    PLATELET 176 79/83/7686 12:11 PM       Lab Results   Component Value Date/Time    Sodium 137 11/10/2018 12:11 PM    Potassium 4.1 11/10/2018 12:11 PM    Chloride 99 11/10/2018 12:11 PM    CO2 29 11/10/2018 12:11 PM    Glucose 92 11/10/2018 12:11 PM    BUN 10 11/10/2018 12:11 PM    Creatinine 0.90 11/10/2018 12:11 PM    Calcium 8.5 11/10/2018 12:11 PM       No components found for: TROPQUANT    No results found for: NITESH      Lab Results   Component Value Date/Time    Hemoglobin A1c 4.9 02/21/2017 02:09 PM        Lab Results   Component Value Date/Time    Vitamin B12 903 08/09/2018 01:18 PM    Folate 7.2 04/20/2015 08:41 AM       No results found for: NITESHGay XBANA    Lab Results   Component Value Date/Time    Cholesterol, total 241 (H) 06/20/2018 11:55 AM    HDL Cholesterol 54 06/20/2018 11:55 AM    LDL, calculated 107 (H) 06/20/2018 11:55 AM    VLDL, calculated 80 (H) 06/20/2018 11:55 AM    Triglyceride 398 (H) 06/20/2018 11:55 AM         CT Results (recent):  Results from [facility] encounter on 11/10/18   CT ABD PELV W CONT    Narrative EXAM:  CT ABD PELV W CONT    INDICATION: painless jaundice    COMPARISON: None    CONTRAST:  100 mL of Isovue-370. TECHNIQUE:   Following the uneventful intravenous administration of contrast, thin axial  images were obtained through the abdomen and pelvis. Coronal and sagittal  reconstructions were generated. Oral contrast was not administered. CT dose  reduction was achieved through use of a standardized protocol tailored for this  examination and automatic exposure control for dose modulation. FINDINGS:   LUNG BASES: Clear. INCIDENTALLY IMAGED HEART AND MEDIASTINUM: Unremarkable. LIVER: No mass or biliary dilatation. GALLBLADDER: Contracted. No stones identified. SPLEEN: No mass. PANCREAS: No mass or ductal dilatation. ADRENALS: Unremarkable. KIDNEYS: 2 mm nonobstructing stone upper pole left kidney. 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 No mass or hydronephrosis. STOMACH: Unremarkable. SMALL BOWEL: No dilatation or wall thickening. COLON: No dilatation or wall thickening. APPENDIX: Unremarkable. PERITONEUM: No ascites or pneumoperitoneum. RETROPERITONEUM: No lymphadenopathy or aortic aneurysm. Trace atheromatous  calcifications. Infrarenal IVC filter noted. REPRODUCTIVE ORGANS: Enlarged, heterogeneous prostate gland. URINARY BLADDER: No mass or calculus. BONES: No destructive bone lesion. ADDITIONAL COMMENTS: Small fat-containing left inguinal hernia. Impression IMPRESSION:    1. Normal-appearing liver and pancreas. No biliary ductal dilatation. 2. Enlarged, heterogeneously enhancing prostate gland. Correlate with PSA  values. 3. 2 mm nonobstructing stone upper pole left kidney. Bilateral duplicated  collecting systems. No hydronephrosis.          MRI Results (recent):  Results from Abstract encounter on 06/26/15   MRI BRAIN W WO CONT       IR Results (recent):  No results found for this or any previous visit.    VAS/US Results (recent):  Results from Hospital Encounter encounter on 06/09/16   DUPLEX LOWER EXT VENOUS RIGHT       PHYSICAL EXAM:  General:    Alert, cooperative, no distress, appears stated age. Head:   Normocephalic, without obvious abnormality, atraumatic. Eyes:   Conjunctivae/corneas clear. PERRLA  Nose:  Nares normal. No drainage or sinus tenderness. Throat:    Lips, mucosa, and tongue normal.  No Thrush  Neck:  Supple, symmetrical,  no adenopathy, thyroid: non tender    no carotid bruit and no JVD. Back:    Symmetric,  No CVA tenderness. Lungs:   Clear to auscultation bilaterally. No Wheezing or Rhonchi. No rales. Chest wall:  No tenderness or deformity. No Accessory muscle use. Heart:   Regular rate and rhythm,  no murmur, rub or gallop. Abdomen:   Soft, non-tender. Not distended. Bowel sounds normal. No masses  Extremities: Extremities normal, atraumatic, No cyanosis. No edema. No clubbing  Skin:     Texture, turgor normal. No rashes or lesions. Not Jaundiced  Lymph nodes: Cervical, supraclavicular normal.  Psych:  Good insight. Not depressed. Not anxious or agitated. NEUROLOGICAL EXAM:  Appearance: The patient is well developed, well nourished, provides a coherent history and is in no acute distress. Mental Status: Oriented to time, place and person. Mood and affect appropriate. Cranial Nerves:   Intact visual fields. Fundi are benign. DANNY, EOM's full, no nystagmus, no ptosis. Facial sensation is normal. Corneal reflexes are intact. Facial movement is symmetric. Hearing is normal bilaterally. Palate is midline with normal sternocleidomastoid and trapezius muscles are normal. Tongue is midline. Motor:  5/5 strength in upper and lower proximal and distal muscles. Normal bulk and tone. No fasciculations. Reflexes:   Deep tendon reflexes 2+/4 and symmetrical.   Sensory:    Dysesthesia to touch, pinprick and vibration. Gait:  Normal gait.    Tremor:    Tremor on the right hand noted. Cerebellar:  No cerebellar signs present. Neurovascular:  Normal heart sounds and regular rhythm, peripheral pulses intact, and no carotid bruits. Assesment  1. Partial symptomatic epilepsy with complex partial seizures, not intractable, without status epilepticus ([facility]  Continue management    2. Intellectual disability  Stable    3. Seizures ([facility]  Continue management    4. Tremor  Will monitor  ___________________________________________________  PLAN: Medication and plan discussed with patient      ICD-10-CM ICD-9-CM    1. Partial symptomatic epilepsy with complex partial seizures, not intractable, without status epilepticus ([facility] G40.209 345.40    2. Intellectual disability F79 319    3. [facility]ures [facility] R56.9 780.39    4. Tremor R25.1 781.0      Follow-up and Dispositions    · Return in about 4 months (around 7/2/2020).             :    ___________________________________________________    Attending Physician: Hannah Navarro MD

## 2020-05-08 DIAGNOSIS — I10 ESSENTIAL HYPERTENSION: ICD-10-CM

## 2020-05-08 RX ORDER — LISINOPRIL 10 MG/1
TABLET ORAL
Qty: 90 TAB | Refills: 2 | Status: SHIPPED | OUTPATIENT
Start: 2020-05-08 | End: 2021-01-28

## 2020-07-02 ENCOUNTER — VIRTUAL VISIT (OUTPATIENT)
Dept: NEUROLOGY | Age: 55
End: 2020-07-02

## 2020-07-02 DIAGNOSIS — G40.909 SEIZURE DISORDER (HCC): ICD-10-CM

## 2020-07-02 DIAGNOSIS — R25.1 TREMOR: ICD-10-CM

## 2020-07-02 DIAGNOSIS — G40.209 PARTIAL SYMPTOMATIC EPILEPSY WITH COMPLEX PARTIAL SEIZURES, NOT INTRACTABLE, WITHOUT STATUS EPILEPTICUS (HCC): Primary | ICD-10-CM

## 2020-07-02 DIAGNOSIS — F79 INTELLECTUAL DISABILITY: ICD-10-CM

## 2020-07-02 DIAGNOSIS — N52.2 DRUG-INDUCED ERECTILE DYSFUNCTION: ICD-10-CM

## 2020-07-02 RX ORDER — TADALAFIL 20 MG/1
10 TABLET ORAL AS NEEDED
Qty: 6 TAB | Refills: 3 | Status: SHIPPED | OUTPATIENT
Start: 2020-07-02 | End: 2022-02-25

## 2020-07-02 RX ORDER — LEVETIRACETAM 750 MG/1
1500 TABLET ORAL 2 TIMES DAILY
Qty: 120 TAB | Refills: 6 | Status: SHIPPED | OUTPATIENT
Start: 2020-07-02 | End: 2021-01-08 | Stop reason: SDUPTHER

## 2020-07-02 NOTE — PROGRESS NOTES
Neurology Progress Note    NAME:  Srini Cheema   :   1965   MRN:   [id number]     Date/Time:  2020  Subjective:      Srini Cheeam is a 47 y.o. male here today for follow-up for seizure disorder. Patient says he had only 1 seizure since the last visit, says the seizure was minor and this is because patient went out in the heat. Brandinsea Raffi He says medication is helping. Patient says he still has tremors mostly on  the right hand and comes on when he tries to do something. .  I will continue patient on his current medication. Patient denies neck pain, chest pain, dysphagia, odynophagia, constipation or diarrhea. Review of Systems - General ROS: positive for  - fatigue  Psychological ROS: positive for - anxiety, concentration difficulties and depression  Ophthalmic ROS: positive for - decreased vision  ENT ROS: positive for -icterus  Allergy and Immunology ROS: negative  Hematological and Lymphatic ROS: negative  Endocrine ROS: negative  Respiratory ROS: no cough, shortness of breath, or wheezing  Cardiovascular ROS: no chest pain or dyspnea on exertion  Gastrointestinal ROS: no abdominal pain, change in bowel habits, or black or bloody stools  Genito-Urinary ROS: no dysuria, trouble voiding, or hematuria  Musculoskeletal ROS: positive for - joint pain, joint stiffness, joint swelling, muscle pain and muscular weakness  Neurological ROS: positive for - dizziness, headaches, impaired coordination/balance, seizures, visual changes and weakness  Dermatological ROS: negative      Medications reviewed:  Current Outpatient Medications   Medication Sig Dispense Refill    levETIRAcetam (KEPPRA) 750 mg tablet Take 2 Tabs by mouth two (2) times a day. 120 Tab 6    tadalafiL (CIALIS) 20 mg tablet Take 1 Tab by mouth as needed for Erectile Dysfunction.  6 Tab 3    lisinopriL (PRINIVIL, ZESTRIL) 10 mg tablet TAKE 1 TABLET BY MOUTH EVERY DAY 90 Tab 2    ergocalciferol (ERGOCALCIFEROL) 1,250 mcg (50,000 unit) capsule Take 1 Cap by mouth every seven (7) days. 4 Cap 3    acetaminophen (TYLENOL EXTRA STRENGTH) 500 mg tablet Take 1-2 Tabs by mouth every eight (8) hours as needed for Pain. 30 Tab 0    apixaban (ELIQUIS) 5 mg tablet Take 1 Tab by mouth two (2) times a day. Indications: Deep Vein Thrombosis Prevention 60 Tab 11        Objective:   Vitals: There were no vitals filed for this visit. Lab Data Reviewed:  Lab Results   Component Value Date/Time    WBC 7.3 11/10/2018 12:11 PM    HCT 44.7 11/10/2018 12:11 PM    HGB 15.7 11/10/2018 12:11 PM    PLATELET 056 91/80/7703 12:11 PM       Lab Results   Component Value Date/Time    Sodium 137 11/10/2018 12:11 PM    Potassium 4.1 11/10/2018 12:11 PM    Chloride 99 11/10/2018 12:11 PM    CO2 29 11/10/2018 12:11 PM    Glucose 92 11/10/2018 12:11 PM    BUN 10 11/10/2018 12:11 PM    Creatinine 0.90 11/10/2018 12:11 PM    Calcium 8.5 11/10/2018 12:11 PM       No components found for: TROPQUANT    No results found for: NITESH      Lab Results   Component Value Date/Time    Hemoglobin A1c 4.9 02/21/2017 02:09 PM        Lab Results   Component Value Date/Time    Vitamin B12 903 08/09/2018 01:18 PM    Folate 7.2 04/20/2015 08:41 AM       No results found for: NITESH, Luzmaria William, ALETAANA    Lab Results   Component Value Date/Time    Cholesterol, total 241 (H) 06/20/2018 11:55 AM    HDL Cholesterol 54 06/20/2018 11:55 AM    LDL, calculated 107 (H) 06/20/2018 11:55 AM    VLDL, calculated 80 (H) 06/20/2018 11:55 AM    Triglyceride 398 (H) 06/20/2018 11:55 AM         CT Results (recent):  Results from [place] encounter on 11/10/18   CT ABD PELV W CONT    Narrative EXAM:  CT ABD PELV W CONT    INDICATION: painless jaundice    COMPARISON: None    CONTRAST:  100 mL of Isovue-370. TECHNIQUE:   Following the uneventful intravenous administration of contrast, thin axial  images were obtained through the abdomen and pelvis. Coronal and sagittal  reconstructions were generated.  Oral contrast was not administered. CT dose  reduction was achieved through use of a standardized protocol tailored for this  examination and automatic exposure control for dose modulation. FINDINGS:   LUNG BASES: Clear. INCIDENTALLY IMAGED HEART AND MEDIASTINUM: Unremarkable. LIVER: No mass or biliary dilatation. GALLBLADDER: Contracted. No stones identified. SPLEEN: No mass. PANCREAS: No mass or ductal dilatation. ADRENALS: Unremarkable. KIDNEYS: 2 mm nonobstructing stone upper pole left kidney. 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 No mass or hydronephrosis. STOMACH: Unremarkable. SMALL BOWEL: No dilatation or wall thickening. COLON: No dilatation or wall thickening. APPENDIX: Unremarkable. PERITONEUM: No ascites or pneumoperitoneum. RETROPERITONEUM: No lymphadenopathy or aortic aneurysm. Trace atheromatous  calcifications. Infrarenal IVC filter noted. REPRODUCTIVE ORGANS: Enlarged, heterogeneous prostate gland. URINARY BLADDER: No mass or calculus. BONES: No destructive bone lesion. ADDITIONAL COMMENTS: Small fat-containing left inguinal hernia. Impression IMPRESSION:    1. Normal-appearing liver and pancreas. No biliary ductal dilatation. 2. Enlarged, heterogeneously enhancing prostate gland. Correlate with PSA  values. 3. 2 mm nonobstructing stone upper pole left kidney. Bilateral duplicated  collecting systems. No hydronephrosis. MRI Results (recent):  Results from Abstract encounter on 06/26/15   MRI BRAIN W WO CONT       IR Results (recent):  No results found for this or any previous visit. VAS/US Results (recent):  Results from Hospital Encounter encounter on 06/09/16   DUPLEX LOWER EXT VENOUS RIGHT       PHYSICAL EXAM:      NEUROLOGICAL EXAM:    Assesment  1. Partial symptomatic epilepsy with complex partial seizures, not intractable, without status epilepticus ([address]  Continue management    2. Tremor  We will monitor    3. Intellectual disability  Stable    4.  Seizure disorder ([facility]  Stable    ___________________________________________________  PLAN: Medication and plan discussed with patient      ICD-10-CM ICD-9-CM    1. Partial symptomatic epilepsy with complex partial seizures, not intractable, without status epilepticus ([facility] G40.209 345.40    2. Tremor R25.1 781.0    3. Intellectual disability F79 319    4. Seizure disorder ([facility] G40.909 345.90    5. Drug-induced erectile dysfunction N52.2 607.84      E980.5      Follow-up and Dispositions    · Return in about 6 months (around 1/2/2021).          About 25 minutes spent with patient on the phone  ___________________________________________________    Attending Physician: Maximino Silverio MD

## 2020-10-06 RX ORDER — ERGOCALCIFEROL 1.25 MG/1
50000 CAPSULE ORAL
Qty: 4 CAP | Refills: 3 | Status: SHIPPED | OUTPATIENT
Start: 2020-10-06 | End: 2021-01-08 | Stop reason: SDUPTHER

## 2021-01-08 ENCOUNTER — VIRTUAL VISIT (OUTPATIENT)
Dept: NEUROLOGY | Age: 56
End: 2021-01-08
Payer: MEDICAID

## 2021-01-08 DIAGNOSIS — G40.209 PARTIAL SYMPTOMATIC EPILEPSY WITH COMPLEX PARTIAL SEIZURES, NOT INTRACTABLE, WITHOUT STATUS EPILEPTICUS (HCC): Primary | ICD-10-CM

## 2021-01-08 DIAGNOSIS — F79 INTELLECTUAL DISABILITY: ICD-10-CM

## 2021-01-08 DIAGNOSIS — E55.9 VITAMIN D DEFICIENCY: ICD-10-CM

## 2021-01-08 PROCEDURE — 99214 OFFICE O/P EST MOD 30 MIN: CPT | Performed by: PSYCHIATRY & NEUROLOGY

## 2021-01-08 RX ORDER — LEVETIRACETAM 750 MG/1
1500 TABLET ORAL 2 TIMES DAILY
Qty: 120 TAB | Refills: 6 | Status: SHIPPED | OUTPATIENT
Start: 2021-01-08 | End: 2021-12-02

## 2021-01-08 RX ORDER — ERGOCALCIFEROL 1.25 MG/1
50000 CAPSULE ORAL
Qty: 4 CAP | Refills: 3 | Status: SHIPPED | OUTPATIENT
Start: 2021-01-08 | End: 2021-05-26 | Stop reason: SDUPTHER

## 2021-01-15 NOTE — PROGRESS NOTES
Neurology Progress Note  Booker Jeronimo was seen by synchronous (real-time) audio-video technology on 21. Consent:  He and/or his healthcare decision maker is aware that this patient-initiated Telehealth encounter is a billable service, with coverage as determined by his insurance carrier. He is aware that he may receive a bill and has provided verbal consent to proceed: Yes    I was in the office while conducting this encounter. Pursuant to the emergency declaration under the [address] [place] [address] waiver authority and the [employer] Act, this Virtual  Visit was conducted, with patient's consent, to reduce the patient's risk of exposure to COVID-19 and provide continuity of care for an established patient. Services were provided through a video synchronous discussion virtually to substitute for in-person clinic visit. NAME:  Booker Jeronimo   :   1965   MRN:   [id number]     Date/Time:  2021  Subjective:   Booker Jeronimo is a 54 y.o. male here today for follow-up for seizure disorder. Patient and caregiver said that he experienced his last seizure today, no major seizure for the past 6 months. Medication has been very helpful  Patient says he still has tremors mostly on  the right hand and comes on when he tries to do something but has not gotten any worse. .  I will continue patient on his current medication. Patient denies neck pain, chest pain, dysphagia, odynophagia, constipation or diarrhea.   Review of Systems - General ROS: positive for  - fatigue  Psychological ROS: positive for - anxiety, concentration difficulties and depression  Ophthalmic ROS: positive for - decreased vision  ENT ROS: positive for -icterus  Allergy and Immunology ROS: negative  Hematological and Lymphatic ROS: negative  Endocrine ROS: negative  Respiratory ROS: no cough, shortness of breath, or wheezing  Cardiovascular ROS: no chest pain or dyspnea on exertion  Gastrointestinal ROS: no abdominal pain, change in bowel habits, or black or bloody stools  Genito-Urinary ROS: no dysuria, trouble voiding, or hematuria  Musculoskeletal ROS: positive for - joint pain, joint stiffness, joint swelling, muscle pain and muscular weakness  Neurological ROS: positive for - dizziness, headaches, impaired coordination/balance, seizures, visual changes and weakness  Dermatological ROS: negative             Medications reviewed:  Current Outpatient Medications   Medication Sig Dispense Refill    levETIRAcetam (KEPPRA) 750 mg tablet Take 2 Tabs by mouth two (2) times a day. 120 Tab 6    ergocalciferol (ERGOCALCIFEROL) 1,250 mcg (50,000 unit) capsule Take 1 Cap by mouth every seven (7) days. 4 Cap 3    Eliquis 5 mg tablet TAKE 1 TAB BY MOUTH TWO (2) TIMES A DAY. INDICATIONS: DEEP VEIN THROMBOSIS PREVENTION 180 Tab 1    tadalafiL (CIALIS) 20 mg tablet Take 1 Tab by mouth as needed for Erectile Dysfunction. 6 Tab 3    lisinopriL (PRINIVIL, ZESTRIL) 10 mg tablet TAKE 1 TABLET BY MOUTH EVERY DAY 90 Tab 2    acetaminophen (TYLENOL EXTRA STRENGTH) 500 mg tablet Take 1-2 Tabs by mouth every eight (8) hours as needed for Pain. 30 Tab 0        Objective:   Vitals: There were no vitals filed for this visit.             Lab Data Reviewed:  Lab Results   Component Value Date/Time    WBC 7.3 11/10/2018 12:11 PM    HCT 44.7 11/10/2018 12:11 PM    HGB 15.7 11/10/2018 12:11 PM    PLATELET 650 36/45/2405 12:11 PM       Lab Results   Component Value Date/Time    Sodium 137 11/10/2018 12:11 PM    Potassium 4.1 11/10/2018 12:11 PM    Chloride 99 11/10/2018 12:11 PM    CO2 29 11/10/2018 12:11 PM    Glucose 92 11/10/2018 12:11 PM    BUN 10 11/10/2018 12:11 PM    Creatinine 0.90 11/10/2018 12:11 PM    Calcium 8.5 11/10/2018 12:11 PM       No components found for: TROPQUANT    No results found for: NITESH      Lab Results   Component Value Date/Time    Hemoglobin A1c 4.9 02/21/2017 02:09 PM        Lab Results   Component Value Date/Time    Vitamin B12 903 08/09/2018 01:18 PM    Folate 7.2 04/20/2015 08:41 AM       No results found for: NITESH, Eric Distel, XBANA    Lab Results   Component Value Date/Time    Cholesterol, total 241 (H) 06/20/2018 11:55 AM    HDL Cholesterol 54 06/20/2018 11:55 AM    LDL, calculated 107 (H) 06/20/2018 11:55 AM    VLDL, calculated 80 (H) 06/20/2018 11:55 AM    Triglyceride 398 (H) 06/20/2018 11:55 AM         CT Results (recent):  Results from [facility] encounter on 11/10/18   CT ABD PELV W CONT    Narrative EXAM:  CT ABD PELV W CONT    INDICATION: painless jaundice    COMPARISON: None    CONTRAST:  100 mL of Isovue-370. TECHNIQUE:   Following the uneventful intravenous administration of contrast, thin axial  images were obtained through the abdomen and pelvis. Coronal and sagittal  reconstructions were generated. Oral contrast was not administered. CT dose  reduction was achieved through use of a standardized protocol tailored for this  examination and automatic exposure control for dose modulation. FINDINGS:   LUNG BASES: Clear. INCIDENTALLY IMAGED HEART AND MEDIASTINUM: Unremarkable. LIVER: No mass or biliary dilatation. GALLBLADDER: Contracted. No stones identified. SPLEEN: No mass. PANCREAS: No mass or ductal dilatation. ADRENALS: Unremarkable. KIDNEYS: 2 mm nonobstructing stone upper pole left kidney. 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 No mass or hydronephrosis. STOMACH: Unremarkable. SMALL BOWEL: No dilatation or wall thickening. COLON: No dilatation or wall thickening. APPENDIX: Unremarkable. PERITONEUM: No ascites or pneumoperitoneum. RETROPERITONEUM: No lymphadenopathy or aortic aneurysm. Trace atheromatous  calcifications. Infrarenal IVC filter noted. REPRODUCTIVE ORGANS: Enlarged, heterogeneous prostate gland. URINARY BLADDER: No mass or calculus.   BONES: No destructive bone lesion. ADDITIONAL COMMENTS: Small fat-containing left inguinal hernia. Impression IMPRESSION:    1. Normal-appearing liver and pancreas. No biliary ductal dilatation. 2. Enlarged, heterogeneously enhancing prostate gland. Correlate with PSA  values. 3. 2 mm nonobstructing stone upper pole left kidney. Bilateral duplicated  collecting systems. No hydronephrosis. MRI Results (recent):  Results from Abstract encounter on 06/26/15   MRI BRAIN W WO CONT       IR Results (recent):  No results found for this or any previous visit. VAS/US Results (recent):  Results from Hospital Encounter encounter on 06/09/16   DUPLEX LOWER EXT VENOUS RIGHT       PHYSICAL EXAM:  General:    Alert, cooperative, no distress, appears stated age. Head:   Normocephalic, without obvious abnormality, atraumatic. Eyes:   Conjunctivae/corneas clear. Nose:  Nares normal.   Throat:    Lips,and tongue normal.  No Thrush  Neck:  Symmetrical,  no adenopathy, thyroid. no JVD. Back:    Symmetric. Lungs:   Deferred. Chest wall:   No Accessory muscle use. Heart:   Deferred. Abdomen:    Not distended. Extremities: Extremities normal, atraumatic, No cyanosis. No edema. No clubbing  Skin:      No rashes or lesions. Not Jaundiced  Lymph nodes: Cervical, supraclavicular normal.  Psych:  Good insight. Not depressed. Not anxious or agitated. NEUROLOGICAL EXAM:  Appearance: The patient is well developed, well nourished, provides a coherent history and is in no acute distress. Mental Status: Oriented to time, place and person. Mood and affect appropriate. Cranial Nerves:   Intact visual fields. EOM's full, no nystagmus, no ptosis. . Facial movement is symmetric. Hearing is normal bilaterally . Tongue is midline. Motor:   Moves all extremities. No fasciculations. Reflexes:   Deferred. Sensory:   Deferred. Gait:  Normal gait. Tremor:    Mild tremor noted.    Cerebellar:  No cerebellar signs present. Assesment  1. Partial symptomatic epilepsy with complex partial seizures, not intractable, without status epilepticus (HCC)  Stable    2. Intellectual disability  Stable    3. Vitamin D deficiency  Vitamin D replacement    ___________________________________________________  PLAN: Medication and plan discussed with patient's caregiver patient and patient      ICD-10-CM ICD-9-CM    1. Partial symptomatic epilepsy with complex partial seizures, not intractable, without status epilepticus [facility]  G40.209 345.40    2. Intellectual disability  F79 319    3. Vitamin D deficiency  E55.9 268.9      Follow-up and Dispositions    · Return in about 4 months (around 5/8/2021).                ___________________________________________________    Attending Physician: Ya Montanez MD

## 2021-02-03 ENCOUNTER — OFFICE VISIT (OUTPATIENT)
Dept: INTERNAL MEDICINE CLINIC | Age: 56
End: 2021-02-03
Payer: MEDICAID

## 2021-02-03 VITALS
RESPIRATION RATE: 19 BRPM | TEMPERATURE: 98.6 F | OXYGEN SATURATION: 97 % | DIASTOLIC BLOOD PRESSURE: 89 MMHG | BODY MASS INDEX: 26.94 KG/M2 | SYSTOLIC BLOOD PRESSURE: 154 MMHG | WEIGHT: 161.7 LBS | HEIGHT: 65 IN | HEART RATE: 95 BPM

## 2021-02-03 DIAGNOSIS — Z23 NEEDS FLU SHOT: ICD-10-CM

## 2021-02-03 DIAGNOSIS — I82.409 RECURRENT DEEP VEIN THROMBOSIS (DVT) (HCC): ICD-10-CM

## 2021-02-03 DIAGNOSIS — Z79.01 ANTICOAGULANT LONG-TERM USE: ICD-10-CM

## 2021-02-03 DIAGNOSIS — Z12.11 COLON CANCER SCREENING: ICD-10-CM

## 2021-02-03 DIAGNOSIS — Z11.3 SCREEN FOR STD (SEXUALLY TRANSMITTED DISEASE): ICD-10-CM

## 2021-02-03 DIAGNOSIS — I10 ESSENTIAL HYPERTENSION: Primary | ICD-10-CM

## 2021-02-03 DIAGNOSIS — Z86.39 H/O VITAMIN D DEFICIENCY: ICD-10-CM

## 2021-02-03 PROCEDURE — 90471 IMMUNIZATION ADMIN: CPT | Performed by: INTERNAL MEDICINE

## 2021-02-03 PROCEDURE — 90686 IIV4 VACC NO PRSV 0.5 ML IM: CPT | Performed by: INTERNAL MEDICINE

## 2021-02-03 PROCEDURE — 99214 OFFICE O/P EST MOD 30 MIN: CPT | Performed by: INTERNAL MEDICINE

## 2021-02-03 RX ORDER — LISINOPRIL 10 MG/1
10 TABLET ORAL DAILY
Qty: 90 TAB | Refills: 1 | Status: SHIPPED | OUTPATIENT
Start: 2021-02-03 | End: 2021-06-09 | Stop reason: SDUPTHER

## 2021-02-03 NOTE — PROGRESS NOTES
Chief Complaint   Patient presents with    Hypertension     1. Have you been to the ER, urgent care clinic since your last visit? Hospitalized since your last visit? No    2. Have you seen or consulted any other health care providers outside of the [address] [place] [address] since your last visit? Include any pap smears or colon screening. No       After obtaining consent, and per orders of Dr. Geri Romberg, injection of Influenza given by Sophie Rueda LPN. Patient instructed to remain in clinic for 15 minutes afterwards, and to report any adverse reaction to me immediately.

## 2021-02-03 NOTE — PROGRESS NOTES
Vida Perez is a 54 y.o. male and presents with Hypertension  . Subjective:    Pts last appointment 12/19. 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 Pt has no complaints. Taking his medication daily   -  BP Readings from Last 3 Encounters:   02/03/21 (!) 154/89   03/02/20 130/69   12/02/19 122/78       4)Elevated LFTs-  Lab Results   Component Value Date/Time    ALT (SGPT) 32 07/31/2019 11:43 AM     (H) 07/31/2019 11:43 AM    Alk.  phosphatase 64 07/31/2019 11:43 AM    Bilirubin, direct 0.16 07/31/2019 11:43 AM    Bilirubin, total 0.5 07/31/2019 11:43 AM     Pt has admitted to drinking ~16 OUNCES OF BEER DAILY (less than previously)    Review of Systems  Constitutional: negative for fevers, chills, anorexia and weight loss  Respiratory:  negative for cough, hemoptysis, dyspnea,wheezing  CV:   negative for chest pain, palpitations, lower extremity edema  GI:   negative for nausea, vomiting, diarrhea, abdominal pain,melena  Integument:  negative for rash and pruritus  Hematologic:  negative for easy bruising and gum/nose bleeding  Musculoskel: negative for myalgias, arthralgias, back pain, muscle weakness, joint pain  Neurological:  negative for headaches, dizziness, vertigo, memory problems and gait   Behavl/Psych: negative for feelings of anxiety, depression, mood changes    Past Medical History:   Diagnosis Date    Anxiety disorder     Depression     DVT (deep venous thrombosis) ([facility] [address] 7/2/2013    Fatigue     Presence of IVC filter     Pulmonary embolism ([address] 7/4/2013    Seizures ([facility] [address]     monthly seizures- sometimes several per month- managed by PCP at this time, per caregiver    Trauma     hit in head wiht board playing dominos      Past Surgical History:   Procedure Laterality Date    HX CRANIOTOMY  2006    Trauma repair    VASCULAR SURGERY PROCEDURE UNLIST  2013    IVC filter     Social History Socioeconomic History    Marital status: SINGLE     Spouse name: Not on file    Number of children: Not on file    Years of education: Not on file    Highest education level: Not on file   Tobacco Use    Smoking status: Never Smoker    Smokeless tobacco: Never Used   Substance and Sexual Activity    Alcohol use: Yes     Alcohol/week: 8.3 standard drinks     Types: 10 Cans of beer per week     Frequency: 2-4 times a month     Drinks per session: 1 or 2     Binge frequency: Never    Drug use: No    Sexual activity: Yes     Partners: Female   Other Topics Concern     Service No    Blood Transfusions No    Caffeine Concern No    Occupational Exposure No    Hobby Hazards No    Sleep Concern No    Stress Concern No    Weight Concern No    Special Diet No    Back Care No    Exercise No    Bike Helmet No    Seat Belt No    Self-Exams No   Social History Narrative    ** Merged History Encounter **         ** Data from: 13 Enc Dept: [facility]         ** Data from: 7/3/13 Enc Dept: [facility] [employer] [place]. 10th Grade education from [employer] in [place] program.    Has a total of 8 brothers and sisters. Single with mariorijas and 4 children. Hobbies: fishing. Family History   Problem Relation Age of Onset    Alcohol abuse Father 21         of hepatic cirrhosis in his 45s. Was drinking 4-5 bottles of wine daily.  Seizures Father     Hypertension Mother 61    COPD Mother     Sleep Apnea Mother      Current Outpatient Medications   Medication Sig Dispense Refill    lisinopriL (PRINIVIL, ZESTRIL) 10 mg tablet Take 1 Tab by mouth daily. 90 Tab 1    levETIRAcetam (KEPPRA) 750 mg tablet Take 2 Tabs by mouth two (2) times a day. 120 Tab 6    ergocalciferol (ERGOCALCIFEROL) 1,250 mcg (50,000 unit) capsule Take 1 Cap by mouth every seven (7) days.  4 Cap 3    Eliquis 5 mg tablet TAKE 1 TAB BY MOUTH TWO (2) TIMES A DAY. INDICATIONS: DEEP VEIN THROMBOSIS PREVENTION 180 Tab 1    tadalafiL (CIALIS) 20 mg tablet Take 1 Tab by mouth as needed for Erectile Dysfunction. 6 Tab 3    acetaminophen (TYLENOL EXTRA STRENGTH) 500 mg tablet Take 1-2 Tabs by mouth every eight (8) hours as needed for Pain. 30 Tab 0     No Known Allergies    Objective:  Visit Vitals  BP (!) 154/89 (BP 1 Location: Left upper arm, BP Patient Position: Sitting, BP Cuff Size: Adult)   Pulse 95   Temp 98.6 °F (37 °C) (Temporal)   Resp 19   Ht 5' 5\" (1.651 m)   Wt 161 lb 11.2 oz (73.3 kg)   SpO2 97%   BMI 26.91 kg/m²     Physical Exam:   General appearance - alert, well appearing, and in no distress. Pleasant  Mental status - alert, oriented to person, place, and time  EYE-EOMI  EsMouth - poor dentition  Neck - supple, no significant adenopathy   Chest - clear to auscultation, no wheezes, rales or rhonchi, symmetric air entry   Heart - normal rate, regular rhythm, normal S1, S2  Abdomen - soft, nontender, +bs   Ext-peripheral pulses normal, no pedal edema, no clubbing or cyanosis  Neuro -alert, oriented, normal speech, no focal findings or movement disorder noted        Results for orders placed or performed during the hospital encounter of 08/13/19   EKG, 12 LEAD, INITIAL   Result Value Ref Range    Ventricular Rate 90 BPM    Atrial Rate 90 BPM    P-R Interval 122 ms    QRS Duration 70 ms    Q-T Interval 350 ms    QTC Calculation (Bezet) 428 ms    Calculated P Axis 63 degrees    Calculated R Axis 34 degrees    Calculated T Axis 60 degrees    Diagnosis       sinus rhythm with abbreviated NH interval  otherwise normal with no interval change  Confirmed by Casimiro Marvin MD, Anyi Gutiérrez ([id number]) on 8/14/2019 11:52:02 AM         Assessment/Plan:    ICD-10-CM ICD-9-CM    1. Essential hypertension  I10 401.9 lisinopriL (PRINIVIL, ZESTRIL) 10 mg tablet      METABOLIC PANEL, COMPREHENSIVE      LIPID PANEL      CBC WITH AUTOMATED DIFF      PSA W/ REFLX FREE PSA   2. Recurrent deep vein thrombosis (DVT) (HCC)  I82.409 453.40    3. Anticoagulant long-term use  Z79.01 V58.61    4. Needs flu shot  Z23 V04.81 INFLUENZA VIRUS VAC QUAD,SPLIT,PRESV FREE SYRINGE IM   5. Colon cancer screening  Z12.11 V76.51 REFERRAL TO GASTROENTEROLOGY   6. Screen for STD (sexually transmitted disease)  Z11.3 V74.5 HIV 1/2 AG/AB, 4TH GENERATION,W RFLX CONFIRM      T VAGINALIS AMPLIFICATION      CHLAMYDIA/GC PCR   7. H/O vitamin D deficiency  Z86.39 V12.1 VITAMIN D, 25 HYDROXY     Orders Placed This Encounter    CHLAMYDIA/GC PCR     Order Specific Question:   Sample source     Answer:   Urine [258]     Order Specific Question:   Specimen source     Answer:   Urine [258]    Influenza Virus Vaccine QUAD, PF Syr 6 Months + (Flulaval, Fluzone, Fluarix [id number])    VITAMIN D, 25 HYDROXY    METABOLIC PANEL, COMPREHENSIVE    LIPID PANEL    HIV 1/2 AG/AB, 4TH GENERATION,W RFLX CONFIRM    CBC WITH AUTOMATED DIFF    T VAGINALIS AMPLIFICATION     Order Specific Question:   Specimen source     Answer:   Urine [258]    PSA W/ REFLX FREE PSA    REFERRAL TO GASTROENTEROLOGY     Referral Priority:   Routine     Referral Type:   Consultation     Referral Reason:   Specialty Services Required     Number of Visits Requested:   1    lisinopriL (PRINIVIL, ZESTRIL) 10 mg tablet     Sig: Take 1 Tab by mouth daily. Dispense:  90 Tab     Refill:  1     DX Code Needed  . 1. Essential hypertension  F/u for rpt bp check  Usually well controlled  - lisinopriL (PRINIVIL, ZESTRIL) 10 mg tablet; Take 1 Tab by mouth daily. Dispense: 90 Tab; Refill: 1  - METABOLIC PANEL, COMPREHENSIVE  - LIPID PANEL  - CBC WITH AUTOMATED DIFF  - PSA W/ REFLX FREE PSA    2. Recurrent deep vein thrombosis (DVT) (HCC)  Noted    3. Anticoagulant long-term use  Noted  Cont eliquis    4. Needs flu shot  Administered  - INFLUENZA VIRUS VAC QUAD,SPLIT,PRESV FREE SYRINGE IM    5. Colon cancer screening    - REFERRAL TO GASTROENTEROLOGY    6. Screen for STD (sexually transmitted disease)    - HIV 1/2 AG/AB, 4TH GENERATION,W RFLX CONFIRM  - T VAGINALIS AMPLIFICATION  - CHLAMYDIA/GC PCR    7. H/O vitamin D deficiency    - VITAMIN D, 25 HYDROXY          Patient Instructions     Vaccine Information Statement    Influenza (Flu) Vaccine (Inactivated or Recombinant): What You Need to Know    Many Vaccine Information Statements are available in [place] and other languages. See www.immunize.org/vis  Hojas de información sobre vacunas están disponibles en español y en muchos otros idiomas. Visite www.immunize.org/vis    1. Why get vaccinated? Influenza vaccine can prevent influenza (flu). Flu is a contagious disease that spreads around the [place] every year, usually between October and May. Anyone can get the flu, but it is more dangerous for some people. Infants and young children, people 72years of age and older, pregnant women, and people with certain health conditions or a weakened immune system are at greatest risk of flu complications. Pneumonia, bronchitis, sinus infections and ear infections are examples of flu-related complications. If you have a medical condition, such as heart disease, cancer or diabetes, flu can make it worse. Flu can cause fever and chills, sore throat, muscle aches, fatigue, cough, headache, and runny or stuffy nose. Some people may have vomiting and diarrhea, though this is more common in children than adults. Each year thousands of people in the [facility] die from flu, and many more are hospitalized. Flu vaccine prevents millions of illnesses and flu-related visits to the doctor each year. 2. Influenza vaccines     CDC recommends everyone 10months of age and older get vaccinated every flu season. Children 6 months through 6years of age may need 2 doses during a single flu season. Everyone else needs only 1 dose each flu season.     It takes about 2 weeks for protection to develop after vaccination. There are many flu viruses, and they are always changing. Each year a new flu vaccine is made to protect against three or four viruses that are likely to cause disease in the upcoming flu season. Even when the vaccine doesnt exactly match these viruses, it may still provide some protection. Influenza vaccine does not cause flu. Influenza vaccine may be given at the same time as other vaccines. 3. Talk with your health care provider    Tell your vaccine provider if the person getting the vaccine:   Has had an allergic reaction after a previous dose of influenza vaccine, or has any severe, life-threatening allergies.  Has ever had Guillain-Barré Syndrome (also called GBS). In some cases, your health care provider may decide to postpone influenza vaccination to a future visit. People with minor illnesses, such as a cold, may be vaccinated. People who are moderately or severely ill should usually wait until they recover before getting influenza vaccine. Your health care provider can give you more information. 4. Risks of a reaction     Soreness, redness, and swelling where shot is given, fever, muscle aches, and headache can happen after influenza vaccine.  There may be a very small increased risk of Guillain-Barré Syndrome (GBS) after inactivated influenza vaccine (the flu shot). Kendal Witt children who get the flu shot along with pneumococcal vaccine (PCV13), and/or DTaP vaccine at the same time might be slightly more likely to have a seizure caused by fever. Tell your health care provider if a child who is getting flu vaccine has ever had a seizure. People sometimes faint after medical procedures, including vaccination. Tell your provider if you feel dizzy or have vision changes or ringing in the ears. As with any medicine, there is a very remote chance of a vaccine causing a severe allergic reaction, other serious injury, or death.     5. What if there is a serious problem? An allergic reaction could occur after the vaccinated person leaves the clinic. If you see signs of a severe allergic reaction (hives, swelling of the face and throat, difficulty breathing, a fast heartbeat, dizziness, or weakness), call 9-1-1 and get the person to the nearest hospital.    For other signs that concern you, call your health care provider. Adverse reactions should be reported to the Vaccine Adverse Event Reporting System (VAERS). Your health care provider will usually file this report, or you can do it yourself. Visit the VAERS website at www.vaers. hhs.gov or call 0-629.294.8266. VAERS is only for reporting reactions, and VAERS staff do not give medical advice. 6. The National Vaccine Injury Compensation Program    The [facility] Vaccine Injury Compensation Program (VICP) is a federal program that was created to compensate people who may have been injured by certain vaccines. Visit the VICP website at www.[facility].gov/vaccinecompensation or call 6-882.786.8183 to learn about the program and about filing a claim. There is a time limit to file a claim for compensation. 7. How can I learn more?  Ask your health care provider.  Call your local or state health department.  Contact the Centers for Disease Control and Prevention (CDC):  - Call 5-652.420.5071 (1-800-CDC-INFO) or  - Visit CDCs influenza website at www.cdc.gov/flu    Vaccine Information Statement (Interim)  Inactivated Influenza Vaccine   8/15/2019  42 NIRANJAN Herbert [address] [id number]   Department of Health and Human Services  Centers for Disease Control and Prevention    Office Use Only         Follow-up and Dispositions    · Return in about 8 weeks (around 3/31/2021) for bp check. I have reviewed with the patient details of the assessment and plan and all questions were answered. Relevent patient education was performed. The most recent lab findings were reviewed with the patient.     An After Visit Summary was printed and given to the patient.

## 2021-02-03 NOTE — PATIENT INSTRUCTIONS
Vaccine Information Statement Influenza (Flu) Vaccine (Inactivated or Recombinant): What You Need to Know Many Vaccine Information Statements are available in Nepali and other languages. See www.immunize.org/vis Hojas de información sobre vacunas están disponibles en español y en muchos otros idiomas. Visite www.immunize.org/vis 1. Why get vaccinated? Influenza vaccine can prevent influenza (flu). Flu is a contagious disease that spreads around the [facility] every year, usually between October and May. Anyone can get the flu, but it is more dangerous for some people. Infants and young children, people 72years of age and older, pregnant women, and people with certain health conditions or a weakened immune system are at greatest risk of flu complications. Pneumonia, bronchitis, sinus infections and ear infections are examples of flu-related complications. If you have a medical condition, such as heart disease, cancer or diabetes, flu can make it worse. Flu can cause fever and chills, sore throat, muscle aches, fatigue, cough, headache, and runny or stuffy nose. Some people may have vomiting and diarrhea, though this is more common in children than adults. Each year thousands of people in the [facility] die from flu, and many more are hospitalized. Flu vaccine prevents millions of illnesses and flu-related visits to the doctor each year. 2. Influenza vaccines CDC recommends everyone 10months of age and older get vaccinated every flu season. Children 6 months through 6years of age may need 2 doses during a single flu season. Everyone else needs only 1 dose each flu season. It takes about 2 weeks for protection to develop after vaccination. There are many flu viruses, and they are always changing. Each year a new flu vaccine is made to protect against three or four viruses that are likely to cause disease in the upcoming flu season. Even when the vaccine doesnt exactly match these viruses, it may still provide some protection. Influenza vaccine does not cause flu. Influenza vaccine may be given at the same time as other vaccines. 3. Talk with your health care provider Tell your vaccine provider if the person getting the vaccine: 
 Has had an allergic reaction after a previous dose of influenza vaccine, or has any severe, life-threatening allergies.  Has ever had Guillain-Barré Syndrome (also called GBS). In some cases, your health care provider may decide to postpone influenza vaccination to a future visit. People with minor illnesses, such as a cold, may be vaccinated. People who are moderately or severely ill should usually wait until they recover before getting influenza vaccine. Your health care provider can give you more information. 4. Risks of a reaction  Soreness, redness, and swelling where shot is given, fever, muscle aches, and headache can happen after influenza vaccine.  There may be a very small increased risk of Guillain-Barré Syndrome (GBS) after inactivated influenza vaccine (the flu shot). Kendal Witt children who get the flu shot along with pneumococcal vaccine (PCV13), and/or DTaP vaccine at the same time might be slightly more likely to have a seizure caused by fever. Tell your health care provider if a child who is getting flu vaccine has ever had a seizure. People sometimes faint after medical procedures, including vaccination. Tell your provider if you feel dizzy or have vision changes or ringing in the ears. As with any medicine, there is a very remote chance of a vaccine causing a severe allergic reaction, other serious injury, or death. 5. What if there is a serious problem? An allergic reaction could occur after the vaccinated person leaves the clinic. If you see signs of a severe allergic reaction (hives, swelling of the face and throat, difficulty breathing, a fast heartbeat, dizziness, or weakness), call 9-1-1 and get the person to the nearest hospital. 
 
For other signs that concern you, call your health care provider. Adverse reactions should be reported to the Vaccine Adverse Event Reporting System (VAERS). Your health care provider will usually file this report, or you can do it yourself. Visit the VAERS website at www.vaers. [facility].gov or call 8-286.345.9542. VAERS is only for reporting reactions, and VAERS staff do not give medical advice. 6. The National Vaccine Injury Compensation Program 
 
The [facility] Vaccine Injury Compensation Program (VICP) is a federal program that was created to compensate people who may have been injured by certain vaccines. Visit the VICP website at www.hrsa.gov/vaccinecompensation or call 4-732.493.5697 to learn about the program and about filing a claim. There is a time limit to file a claim for compensation. 7. How can I learn more?  Ask your health care provider.  Call your local or state health department.  Contact the Centers for Disease Control and Prevention (CDC): 
- Call 4-849.709.4255 (1-800-CDC-INFO) or 
- Visit CDCs influenza website at www.cdc.gov/flu Vaccine Information Statement (Interim) Inactivated Influenza Vaccine 8/15/2019 
42 [facility] [id number] Department of Health and [email or web address] Centers for Disease Control and Prevention Office Use Only

## 2021-02-04 LAB
25(OH)D3+25(OH)D2 SERPL-MCNC: 39.1 NG/ML (ref 30–100)
ALBUMIN SERPL-MCNC: 4.2 G/DL (ref 3.8–4.9)
ALBUMIN/GLOB SERPL: 1.6 {RATIO} (ref 1.2–2.2)
ALP SERPL-CCNC: 73 IU/L (ref 39–117)
ALT SERPL-CCNC: 49 IU/L (ref 0–44)
AST SERPL-CCNC: 47 IU/L (ref 0–40)
BASOPHILS # BLD AUTO: 0.1 X10E3/UL (ref 0–0.2)
BASOPHILS NFR BLD AUTO: 1 %
BILIRUB SERPL-MCNC: 0.5 MG/DL (ref 0–1.2)
BUN SERPL-MCNC: 13 MG/DL (ref 6–24)
BUN/CREAT SERPL: 19 (ref 9–20)
C TRACH RRNA SPEC QL NAA+PROBE: NEGATIVE
CALCIUM SERPL-MCNC: 8.7 MG/DL (ref 8.7–10.2)
CHLORIDE SERPL-SCNC: 99 MMOL/L (ref 96–106)
CHOLEST SERPL-MCNC: 251 MG/DL (ref 100–199)
CO2 SERPL-SCNC: 23 MMOL/L (ref 20–29)
CREAT SERPL-MCNC: 0.69 MG/DL (ref 0.76–1.27)
EOSINOPHIL # BLD AUTO: 0 X10E3/UL (ref 0–0.4)
EOSINOPHIL NFR BLD AUTO: 0 %
ERYTHROCYTE [DISTWIDTH] IN BLOOD BY AUTOMATED COUNT: 13.9 % (ref 11.6–15.4)
GLOBULIN SER CALC-MCNC: 2.6 G/DL (ref 1.5–4.5)
GLUCOSE SERPL-MCNC: 93 MG/DL (ref 65–99)
HCT VFR BLD AUTO: 41.4 % (ref 37.5–51)
HDLC SERPL-MCNC: 70 MG/DL
HGB BLD-MCNC: 14.5 G/DL (ref 13–17.7)
HIV 1+2 AB+HIV1 P24 AG SERPL QL IA: NON REACTIVE
IMM GRANULOCYTES # BLD AUTO: 0 X10E3/UL (ref 0–0.1)
IMM GRANULOCYTES NFR BLD AUTO: 0 %
INTERPRETATION, 910389: NORMAL
LDLC SERPL CALC-MCNC: 141 MG/DL (ref 0–99)
LYMPHOCYTES # BLD AUTO: 1.7 X10E3/UL (ref 0.7–3.1)
LYMPHOCYTES NFR BLD AUTO: 30 %
MCH RBC QN AUTO: 30.7 PG (ref 26.6–33)
MCHC RBC AUTO-ENTMCNC: 35 G/DL (ref 31.5–35.7)
MCV RBC AUTO: 88 FL (ref 79–97)
MONOCYTES # BLD AUTO: 0.8 X10E3/UL (ref 0.1–0.9)
MONOCYTES NFR BLD AUTO: 14 %
N GONORRHOEA RRNA SPEC QL NAA+PROBE: NEGATIVE
NEUTROPHILS # BLD AUTO: 3.1 X10E3/UL (ref 1.4–7)
NEUTROPHILS NFR BLD AUTO: 55 %
PLATELET # BLD AUTO: 317 X10E3/UL (ref 150–450)
POTASSIUM SERPL-SCNC: 4.5 MMOL/L (ref 3.5–5.2)
PROT SERPL-MCNC: 6.8 G/DL (ref 6–8.5)
PSA SERPL-MCNC: 2.1 NG/ML (ref 0–4)
RBC # BLD AUTO: 4.73 X10E6/UL (ref 4.14–5.8)
SODIUM SERPL-SCNC: 136 MMOL/L (ref 134–144)
T VAGINALIS DNA SPEC QL NAA+PROBE: NEGATIVE
TRIGL SERPL-MCNC: 227 MG/DL (ref 0–149)
VLDLC SERPL CALC-MCNC: 40 MG/DL (ref 5–40)
WBC # BLD AUTO: 5.7 X10E3/UL (ref 3.4–10.8)

## 2021-02-22 DIAGNOSIS — Z79.01 ANTICOAGULANT LONG-TERM USE: ICD-10-CM

## 2021-02-22 DIAGNOSIS — I82.409 RECURRENT DEEP VEIN THROMBOSIS (DVT) (HCC): ICD-10-CM

## 2021-02-22 RX ORDER — APIXABAN 5 MG/1
TABLET, FILM COATED ORAL
Qty: 180 TAB | Refills: 1 | Status: SHIPPED | OUTPATIENT
Start: 2021-02-22 | End: 2021-06-24

## 2021-03-11 ENCOUNTER — HOSPITAL ENCOUNTER (EMERGENCY)
Age: 56
Discharge: HOME | End: 2021-03-11
Attending: EMERGENCY MEDICINE
Payer: MEDICAID

## 2021-03-11 VITALS
OXYGEN SATURATION: 100 % | WEIGHT: 159 LBS | HEIGHT: 64 IN | RESPIRATION RATE: 18 BRPM | HEART RATE: 98 BPM | BODY MASS INDEX: 27.14 KG/M2 | SYSTOLIC BLOOD PRESSURE: 148 MMHG | DIASTOLIC BLOOD PRESSURE: 81 MMHG | TEMPERATURE: 98 F

## 2021-03-11 DIAGNOSIS — K59.00 CONSTIPATION, UNSPECIFIED CONSTIPATION TYPE: ICD-10-CM

## 2021-03-11 DIAGNOSIS — K62.5 ANAL BLEEDING: ICD-10-CM

## 2021-03-11 DIAGNOSIS — K60.2 ANAL FISSURE: Primary | ICD-10-CM

## 2021-03-11 PROCEDURE — 99282 EMERGENCY DEPT VISIT SF MDM: CPT

## 2021-03-11 RX ORDER — DOCUSATE SODIUM 100 MG/1
100 CAPSULE, LIQUID FILLED ORAL 2 TIMES DAILY
Qty: 60 CAP | Refills: 2 | Status: SHIPPED | OUTPATIENT
Start: 2021-03-11 | End: 2021-06-09

## 2021-03-11 NOTE — ED PROVIDER NOTES
EMERGENCY DEPARTMENT HISTORY AND PHYSICAL EXAM      Date: 3/11/2021  Patient Name: Uma Hirsch    History of Presenting Illness     Chief Complaint   Patient presents with    Melena     beginning today after eating spicy food last night     History Provided By: Patient    HPI: Uma Hirsch, 54 y.o. male with past medical history significant for anxiety, depression, DVT, PE, and seizures who presents via private vehicle to the ED with cc of 1 episode of bright red blood from his rectum this morning. Patient states he ate spicy food last night and this morning when he had a bowel movement, he noticed some bright red blood in the toilet and when he wiped. He did strain this morning to move his bowels. He denies any lightheadedness, dizziness, blurry vision, shortness of breath, chest pain, or weakness. He denies any further bleeding after that one episode. He did have a similar episode approximately 6 months ago after eating spicy foods at that time that spontaneously resolved. He is worried because he is on Eliquis. PMHx: Anxiety, depression, DVT, PE, seizures  Social Hx: Occasional alcohol use, denies tobacco use, denies illegal drug use    PCP: Wiliam Mccoy MD    There are no other complaints, changes, or physical findings at this time. No current facility-administered medications on file prior to encounter. Current Outpatient Medications on File Prior to Encounter   Medication Sig Dispense Refill    Eliquis 5 mg tablet TAKE 1 TABLET BY MOUTH TWO (2) TIMES A DAY. INDICATIONS: DEEP VEIN THROMBOSIS PREVENTION 180 Tab 1    lisinopriL (PRINIVIL, ZESTRIL) 10 mg tablet Take 1 Tab by mouth daily. 90 Tab 1    levETIRAcetam (KEPPRA) 750 mg tablet Take 2 Tabs by mouth two (2) times a day. 120 Tab 6    ergocalciferol (ERGOCALCIFEROL) 1,250 mcg (50,000 unit) capsule Take 1 Cap by mouth every seven (7) days.  4 Cap 3    tadalafiL (CIALIS) 20 mg tablet Take 1 Tab by mouth as needed for Erectile Dysfunction. 6 Tab 3    acetaminophen (TYLENOL EXTRA STRENGTH) 500 mg tablet Take 1-2 Tabs by mouth every eight (8) hours as needed for Pain. 30 Tab 0     Past History     Past Medical History:  Past Medical History:   Diagnosis Date    Anxiety disorder     Depression     DVT (deep venous thrombosis) (HCC) 2013    Fatigue     Presence of IVC filter     Pulmonary embolism (HCC) 2013    Seizures ([facility])     monthly seizures- sometimes several per month- managed by PCP at this time, per caregiver    Trauma     hit in head wiht board playing [employer]      Past Surgical History:  Past Surgical History:   Procedure Laterality Date    HX CRANIOTOMY  2006    Trauma repair    VASCULAR SURGERY PROCEDURE UNLIST      IVC filter     Family History:  Family History   Problem Relation Age of Onset    Alcohol abuse Father 21         of hepatic cirrhosis in his 45s. Was drinking 4-5 bottles of wine daily.  Seizures Father     Hypertension Mother 61    COPD Mother     Sleep Apnea Mother      Social History:  Social History     Tobacco Use    Smoking status: Never Smoker    Smokeless tobacco: Never Used   Substance Use Topics    Alcohol use: Yes     Alcohol/week: 8.3 standard drinks     Types: 10 Cans of beer per week     Frequency: 2-4 times a month     Drinks per session: 1 or 2     Binge frequency: Never    Drug use: No     Allergies:  No Known Allergies  Review of Systems   Review of Systems   Constitutional: Negative for chills and fever. HENT: Negative for congestion, rhinorrhea, sneezing and sore throat. Eyes: Negative for redness and visual disturbance. Respiratory: Negative for shortness of breath. Cardiovascular: Negative for chest pain and leg swelling. Gastrointestinal: Positive for anal bleeding and blood in stool. Negative for abdominal pain, nausea and vomiting. Genitourinary: Negative for difficulty urinating and frequency.    Musculoskeletal: Negative for back pain, myalgias and neck stiffness. Skin: Negative for rash. Neurological: Negative for dizziness, syncope, weakness and headaches. Hematological: Negative for adenopathy. All other systems reviewed and are negative. Physical Exam   Physical Exam  Vitals signs and nursing note reviewed. Constitutional:       Appearance: Normal appearance. He is well-developed. HENT:      Head: Normocephalic and atraumatic. Neck:      Musculoskeletal: Full passive range of motion without pain, normal range of motion and neck supple. Cardiovascular:      Rate and Rhythm: Normal rate and regular rhythm. Pulses: Normal pulses. Heart sounds: Normal heart sounds. No murmur. Pulmonary:      Effort: Pulmonary effort is normal. No respiratory distress. Breath sounds: Normal breath sounds. Chest:      Chest wall: No tenderness. Abdominal:      General: Bowel sounds are normal.      Palpations: Abdomen is soft. Tenderness: There is no abdominal tenderness. There is no guarding or rebound. Genitourinary:     Rectum: Anal fissure (Left perirectal) present. No tenderness or external hemorrhoid. Comments: Patient declines an internal digital rectal exam  Skin:     General: Skin is warm and dry. Findings: No erythema or rash. Neurological:      Mental Status: He is alert and oriented to person, place, and time. Psychiatric:         Speech: Speech normal.         Behavior: Behavior normal.         Thought Content: Thought content normal.         Judgment: Judgment normal.       Diagnostic Study Results   Labs -   No results found for this or any previous visit (from the past 12 hour(s)). Radiologic Studies -   No orders to display     No results found. Medical Decision Making   I am the first provider for this patient. I reviewed the vital signs, available nursing notes, past medical history, past surgical history, family history and social history.     Vital Signs-Reviewed the patient's vital signs. Patient Vitals for the past 24 hrs:   Temp Pulse Resp BP SpO2   03/11/21 1038 98 °F (36.7 °C) 98 18 (!) 148/81 100 %     Pulse Oximetry Analysis - 100% on RA (normal)    Records Reviewed: Nursing Notes and Old Medical Records    Provider Notes (Medical Decision Making):   68-year-old male presents with one episode of bright red blood per rectum this morning. He denies any melena or dark/tarry stools. He denies any abdominal pain. The bright red blood occurred after straining to move his bowels. On physical exam he has no abnormal findings other than a small anal fissure and his vitals are all normal.  Offered patient labs but he declines. We will start him on a stool softener and have him watch for further bleeding. If he becomes symptomatic or his bleeding continues, we will have him return to the emergency department for further evaluation. ED Course:   Initial assessment performed. The patients presenting problems have been discussed, and they are in agreement with the care plan formulated and outlined with them. I have encouraged them to ask questions as they arise throughout their visit. Progress Note:   Updated pt on all returned results and findings. Discussed the importance of proper follow up as referred below along with return precautions. Pt in agreement with the care plan and expresses agreement with and understanding of all items discussed. Disposition:  Discharge Note:  The pt is ready for discharge. The pt's signs, symptoms, diagnosis, and discharge instructions have been discussed and pt has conveyed their understanding. The pt is to follow up as recommended or return to ER should their symptoms worsen. Plan has been discussed and pt is in agreement. PLAN:  1. Discharge Medication List as of 3/11/2021 11:20 AM      START taking these medications    Details   docusate sodium (COLACE) 100 mg capsule Take 1 Cap by mouth two (2) times a day for 90 days. , Normal, Disp-60 Cap, R-2         CONTINUE these medications which have NOT CHANGED    Details   Eliquis 5 mg tablet TAKE 1 TABLET BY MOUTH TWO (2) TIMES A DAY. INDICATIONS: DEEP VEIN THROMBOSIS PREVENTION, Normal, Disp-180 Tab, R-1      lisinopriL (PRINIVIL, ZESTRIL) 10 mg tablet Take 1 Tab by mouth daily. , Normal, Disp-90 Tab, R-1DX Code Needed  . levETIRAcetam (KEPPRA) 750 mg tablet Take 2 Tabs by mouth two (2) times a day., Normal, Disp-120 Tab, R-6      ergocalciferol (ERGOCALCIFEROL) 1,250 mcg (50,000 unit) capsule Take 1 Cap by mouth every seven (7) days. , Normal, Disp-4 Cap, R-3      tadalafiL (CIALIS) 20 mg tablet Take 1 Tab by mouth as needed for Erectile Dysfunction. , Normal, Disp-6 Tab,R-3      acetaminophen (TYLENOL EXTRA STRENGTH) 500 mg tablet Take 1-2 Tabs by mouth every eight (8) hours as needed for Pain., Normal, Disp-30 Tab, R-0           2. Follow-up Information     Follow up With Specialties Details Why Contact Info    Wolf Harding MD Internal Medicine Schedule an appointment as soon as possible for a visit   [address] [place] [address] [address] [place] [address]      Flori Calhoun MD Internal Medicine, Gastroenterology Schedule an appointment as soon as possible for a visit   [address] [facility] [address]  [facility] 7 [address] [phone]      [address] [place] [address] EMERGENCY DEPT Emergency Medicine  As needed, If symptoms worsen [facility]  [phone]        Return to ED if worse     Diagnosis     Clinical Impression:   1. Anal fissure    2. Constipation, unspecified constipation type    3. Anal bleeding            Please note that this dictation was completed with Dragon, computer voice recognition software. Quite often unanticipated grammatical, syntax, homophones, and other interpretive errors are inadvertently transcribed by the computer software. Please disregard these errors.   Additionally, please excuse any errors that have escaped final proofreading.

## 2021-03-11 NOTE — ED TRIAGE NOTES
Pt presents to ED with c/o bloody stool beginning this morning. Pt reports eating spicy food last night. Pt states similar symptoms 6 months ago.  Pt states Dr Sharon Espinoza is PCP

## 2021-03-11 NOTE — ED NOTES
Pt in with one episode of blood in stool this morning. Pt states he ate \"a lot of spicy food\" last night, noticed small amount of bright red blood in stool this morning. He reports a similar episode of BRB in stool after spicy food approximately 6 months ago. He is currently taking eliquis after PE/DVT. He denies any pain, denies watery stool or hemorrhoids. Emergency Department Nursing Plan of Care       The Nursing Plan of Care is developed from the Nursing assessment and Emergency Department Attending provider initial evaluation. The plan of care may be reviewed in the ED Provider note.     The Plan of Care was developed with the following considerations:   Patient / Family readiness to learn indicated by:verbalized understanding  Persons(s) to be included in education: patient  Barriers to Learning/Limitations:No    Signed     Severo Franc, RN    3/11/2021   10:48 AM

## 2021-05-10 ENCOUNTER — VIRTUAL VISIT (OUTPATIENT)
Dept: NEUROLOGY | Age: 56
End: 2021-05-10

## 2021-05-10 ENCOUNTER — TELEPHONE (OUTPATIENT)
Dept: NEUROLOGY | Age: 56
End: 2021-05-10

## 2021-05-10 NOTE — TELEPHONE ENCOUNTER
----- Message from Huber Samaniego sent at 5/10/2021 12:51 PM EDT -----  Regarding: Dr. Melida Gaona: [phone]  General Message/Vendor Calls    Caller's first and last name:Pt       Reason for call:Pt has been waiting to begin is 11AM appt with Dr. Audrey Lopez. Pt has another appointment soon and would like a call back to get an update on the status of his appt. If appt cannot be held please call pt back to reschedule his appt.        Callback required yes/no and why:Yes, appt       Best contact number(s):[phone]      Details to clarify the request:      Huber Samaniego

## 2021-05-19 ENCOUNTER — VIRTUAL VISIT (OUTPATIENT)
Dept: NEUROLOGY | Age: 56
End: 2021-05-19
Payer: MEDICAID

## 2021-05-19 DIAGNOSIS — G40.919 BREAKTHROUGH SEIZURE (HCC): Primary | ICD-10-CM

## 2021-05-19 DIAGNOSIS — F79 INTELLECTUAL DISABILITY: ICD-10-CM

## 2021-05-19 DIAGNOSIS — E55.9 VITAMIN D DEFICIENCY: ICD-10-CM

## 2021-05-19 DIAGNOSIS — G40.209 PARTIAL SYMPTOMATIC EPILEPSY WITH COMPLEX PARTIAL SEIZURES, NOT INTRACTABLE, WITHOUT STATUS EPILEPTICUS (HCC): ICD-10-CM

## 2021-05-19 PROCEDURE — 99214 OFFICE O/P EST MOD 30 MIN: CPT | Performed by: PSYCHIATRY & NEUROLOGY

## 2021-05-19 NOTE — PROGRESS NOTES
Neurology Progress Note  Josefina Mckeon was seen by synchronous (real-time) audio-video technology on 21. Consent:  He and/or his healthcare decision maker is aware that this patient-initiated Telehealth encounter is a billable service, with coverage as determined by his insurance carrier. He is aware that he may receive a bill and has provided verbal consent to proceed: Yes    I was in the office while conducting this encounter. Pursuant to the emergency declaration under the [facility] [place] [address] waiver authority and the [employer] Act, this Virtual  Visit was conducted, with patient's consent, to reduce the patient's risk of exposure to COVID-19 and provide continuity of care for an established patient. Services were provided through a video synchronous discussion virtually to substitute for in-person clinic visit. NAME:  Josefina Mckeon   :   1965   MRN:   [id number]     Date/Time:  2021  Subjective:   Josefina Mckeon is a 54 y.o. male here today for follow-up for seizure disorder. Patient and caregiver said that he had bad  seizure about 4 weeks ago, according to patient, she was shaking all over and was for couple of minutes. He did not however go to the emergency room. She said that patient has been having some small seizures. Patient says he takes this medication as he still experiences tremors prescribed  At this time, I will obtain EEG, and check the [address] level before making any adjustment. He still experiences tremor. I will continue patient on his current medication, meanwhile. Patient denies neck pain, chest pain, dysphagia, odynophagia, constipation or diarrhea.   Review of Systems - General ROS: positive for  - fatigue  Psychological ROS: positive for - anxiety, concentration difficulties and depression  Ophthalmic ROS: positive for - decreased vision  ENT ROS: positive for -icterus  Allergy and Immunology ROS: negative  Hematological and Lymphatic ROS: negative  Endocrine ROS: negative  Respiratory ROS: no cough, shortness of breath, or wheezing  Cardiovascular ROS: no chest pain or dyspnea on exertion  Gastrointestinal ROS: no abdominal pain, change in bowel habits, or black or bloody stools  Genito-Urinary ROS: no dysuria, trouble voiding, or hematuria  Musculoskeletal ROS: positive for - joint pain, joint stiffness, joint swelling, muscle pain and muscular weakness  Neurological ROS: positive for - dizziness, headaches, impaired coordination/balance, seizures, visual changes and weakness  Dermatological ROS: negative          Medications reviewed:  Current Outpatient Medications   Medication Sig Dispense Refill    docusate sodium (COLACE) 100 mg capsule Take 1 Cap by mouth two (2) times a day for 90 days. 60 Cap 2    Eliquis 5 mg tablet TAKE 1 TABLET BY MOUTH TWO (2) TIMES A DAY. INDICATIONS: DEEP VEIN THROMBOSIS PREVENTION 180 Tab 1    lisinopriL (PRINIVIL, ZESTRIL) 10 mg tablet Take 1 Tab by mouth daily. 90 Tab 1    levETIRAcetam (KEPPRA) 750 mg tablet Take 2 Tabs by mouth two (2) times a day. 120 Tab 6    ergocalciferol (ERGOCALCIFEROL) 1,250 mcg (50,000 unit) capsule Take 1 Cap by mouth every seven (7) days. 4 Cap 3    tadalafiL (CIALIS) 20 mg tablet Take 1 Tab by mouth as needed for Erectile Dysfunction. 6 Tab 3    acetaminophen (TYLENOL EXTRA STRENGTH) 500 mg tablet Take 1-2 Tabs by mouth every eight (8) hours as needed for Pain. 30 Tab 0        Objective:   Vitals: There were no vitals filed for this visit.             Lab Data Reviewed:  Lab Results   Component Value Date/Time    WBC 5.7 02/03/2021 10:36 AM    HCT 41.4 02/03/2021 10:36 AM    HGB 14.5 02/03/2021 10:36 AM    PLATELET 252 78/31/9141 10:36 AM       Lab Results   Component Value Date/Time    Sodium 136 02/03/2021 10:36 AM    Potassium 4.5 02/03/2021 10:36 AM    Chloride 99 02/03/2021 10:36 AM    CO2 23 02/03/2021 10:36 AM    Glucose 93 02/03/2021 10:36 AM    BUN 13 02/03/2021 10:36 AM    Creatinine 0.69 (L) 02/03/2021 10:36 AM    Calcium 8.7 02/03/2021 10:36 AM       No components found for: TROPQUANT    No results found for: NITESH      Lab Results   Component Value Date/Time    Hemoglobin A1c 4.9 02/21/2017 02:09 PM        Lab Results   Component Value Date/Time    Vitamin B12 903 08/09/2018 01:18 PM    Folate 7.2 04/20/2015 08:41 AM       No results found for: NITESH, Yevonne Newer, XBANA    Lab Results   Component Value Date/Time    Cholesterol, total 251 (H) 02/03/2021 10:36 AM    HDL Cholesterol 70 02/03/2021 10:36 AM    LDL, calculated 141 (H) 02/03/2021 10:36 AM    LDL, calculated 107 (H) 06/20/2018 11:55 AM    VLDL, calculated 40 02/03/2021 10:36 AM    VLDL, calculated 80 (H) 06/20/2018 11:55 AM    Triglyceride 227 (H) 02/03/2021 10:36 AM         CT Results (recent):  Results from Hospital Encounter encounter on 11/10/18    CT ABD PELV W CONT    Narrative  EXAM:  CT ABD PELV W CONT    INDICATION: painless jaundice    COMPARISON: None    CONTRAST:  100 mL of Isovue-370. TECHNIQUE:  Following the uneventful intravenous administration of contrast, thin axial  images were obtained through the abdomen and pelvis. Coronal and sagittal  reconstructions were generated. Oral contrast was not administered. CT dose  reduction was achieved through use of a standardized protocol tailored for this  examination and automatic exposure control for dose modulation. FINDINGS:  LUNG BASES: Clear. INCIDENTALLY IMAGED HEART AND MEDIASTINUM: Unremarkable. LIVER: No mass or biliary dilatation. GALLBLADDER: Contracted. No stones identified. SPLEEN: No mass. PANCREAS: No mass or ductal dilatation. ADRENALS: Unremarkable. KIDNEYS: 2 mm nonobstructing stone upper pole left kidney. 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 No mass or hydronephrosis. STOMACH: Unremarkable.   SMALL BOWEL: No dilatation or wall thickening. COLON: No dilatation or wall thickening. APPENDIX: Unremarkable. PERITONEUM: No ascites or pneumoperitoneum. RETROPERITONEUM: No lymphadenopathy or aortic aneurysm. Trace atheromatous  calcifications. Infrarenal IVC filter noted. REPRODUCTIVE ORGANS: Enlarged, heterogeneous prostate gland. URINARY BLADDER: No mass or calculus. BONES: No destructive bone lesion. ADDITIONAL COMMENTS: Small fat-containing left inguinal hernia. Impression  IMPRESSION:    1. Normal-appearing liver and pancreas. No biliary ductal dilatation. 2. Enlarged, heterogeneously enhancing prostate gland. Correlate with PSA  values. 3. 2 mm nonobstructing stone upper pole left kidney. Bilateral duplicated  collecting systems. No hydronephrosis. MRI Results (recent):  Results from Abstract encounter on 06/26/15    MRI BRAIN W WO CONT      IR Results (recent):  No results found for this or any previous visit. VAS/US Results (recent):  Results from Hospital Encounter encounter on 06/09/16    DUPLEX LOWER EXT VENOUS RIGHT      PHYSICAL EXAM:  General:    Alert, cooperative, no distress, appears stated age. Head:   Normocephalic, without obvious abnormality, atraumatic. Eyes:   Conjunctivae/corneas clear. Nose:  Nares normal.   Throat:    Lips,and tongue normal.  No Thrush  Neck:  Symmetrical,  no adenopathy, thyroid. no JVD. Back:    Symmetric. Lungs:   Deferred. Chest wall:   No Accessory muscle use. Heart:   Deferred. Abdomen:    Not distended. Extremities: Extremities normal, atraumatic, No cyanosis. No edema. No clubbing  Skin:      No rashes or lesions. Not Jaundiced  Lymph nodes: Cervical, supraclavicular normal.  Psych:  Good insight. Not depressed. Not anxious or agitated. NEUROLOGICAL EXAM:  Appearance: The patient is well developed, well nourished, provides a coherent history and is in no acute distress. Mental Status: Oriented to time, place and person.  Mood and affect appropriate. Cranial Nerves:   Intact visual fields. EOM's full, no nystagmus, no ptosis. . Facial movement is symmetric. Hearing is normal bilaterally . Tongue is midline. Motor:   Moves all extremities. No fasciculations. Reflexes:   Deferred. Sensory:   Deferred. Gait:  Normal gait. Tremor:    Mild tremor noted. Cerebellar:  No cerebellar signs present. Assesment  1. Breakthrough seizure ([address]    - EEG; Future  - LEVETIRACETAM (KEPPRA); Future    2. Partial symptomatic epilepsy with complex partial seizures, not intractable, without status epilepticus ([facility] [address]    - EEG; Future  - LEVETIRACETAM (KEPPRA); Future    3. Intellectual disability  Stable    4. Vitamin D deficiency  Replace vitamin D    ___________________________________________________  PLAN: Medication and plan discussed with patient      ICD-10-CM ICD-9-CM    1. Breakthrough seizure ([facility] [address]  G40.919 345.91 EEG      LEVETIRACETAM (KEPPRA)   2. Partial symptomatic epilepsy with complex partial seizures, not intractable, without status epilepticus ([facility] [address]  G40.209 345.40 EEG      LEVETIRACETAM (KEPPRA)   3. Intellectual disability  F79 319    4. Vitamin D deficiency  E55.9 268.9      Follow-up and Dispositions    · Return in about 2 months (around 7/19/2021).                ___________________________________________________    Attending Physician: Jesi Burciaga MD

## 2021-05-26 RX ORDER — ERGOCALCIFEROL 1.25 MG/1
50000 CAPSULE ORAL
Qty: 4 CAPSULE | Refills: 3 | Status: SHIPPED | OUTPATIENT
Start: 2021-05-26 | End: 2021-09-22 | Stop reason: SDUPTHER

## 2021-06-09 ENCOUNTER — OFFICE VISIT (OUTPATIENT)
Dept: INTERNAL MEDICINE CLINIC | Age: 56
End: 2021-06-09
Payer: MEDICAID

## 2021-06-09 VITALS
SYSTOLIC BLOOD PRESSURE: 146 MMHG | HEART RATE: 87 BPM | BODY MASS INDEX: 27.52 KG/M2 | DIASTOLIC BLOOD PRESSURE: 82 MMHG | HEIGHT: 64 IN | TEMPERATURE: 98.2 F | RESPIRATION RATE: 19 BRPM | WEIGHT: 161.2 LBS | OXYGEN SATURATION: 98 %

## 2021-06-09 DIAGNOSIS — I10 ESSENTIAL HYPERTENSION: ICD-10-CM

## 2021-06-09 PROCEDURE — 99213 OFFICE O/P EST LOW 20 MIN: CPT | Performed by: INTERNAL MEDICINE

## 2021-06-09 RX ORDER — LISINOPRIL 20 MG/1
20 TABLET ORAL DAILY
Qty: 90 TABLET | Refills: 1 | Status: SHIPPED | OUTPATIENT
Start: 2021-06-09 | End: 2021-11-03

## 2021-06-09 NOTE — PROGRESS NOTES
Elizabeth Thomas is a 54 y.o. male and presents with Hypertension  . Subjective: For bp f/u    Pt has not done his colonoscopy due to transportation issues. Pt has not received his covid vaccine as yet, but intends to. 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  phosphatase 73 02/03/2021 10:36 AM    Bilirubin, direct 0.16 07/31/2019 11:43 AM    Bilirubin, total 0.5 02/03/2021 10:36 AM     Pt has admitted to drinking ~16 OUNCES OF BEER DAILY (less than previously)    Review of Systems  Constitutional: negative for fevers, chills, anorexia and weight loss  Respiratory:  negative for cough, hemoptysis, dyspnea,wheezing  CV:   negative for chest pain, palpitations, lower extremity edema  GI:   negative for nausea, vomiting, diarrhea, abdominal pain,melena  Integument:  negative for rash and pruritus  Hematologic:  negative for easy bruising and gum/nose bleeding  Musculoskel: negative for myalgias, arthralgias, back pain, muscle weakness, joint pain  Neurological:  negative for headaches, dizziness, vertigo, memory problems and gait   Behavl/Psych: negative for feelings of anxiety, depression, mood changes    Past Medical History:   Diagnosis Date    Anxiety disorder     Depression     DVT (deep venous thrombosis) ([address] 7/2/2013    Fatigue     Presence of IVC filter     Pulmonary embolism ([address] 7/4/2013    Seizures ([address]     monthly seizures- sometimes several per month- managed by PCP at this time, per caregiver    Trauma     hit in head wiht board playing [email or web address]      Past Surgical History:   Procedure Laterality Date    HX CRANIOTOMY  2006    Trauma repair    VASCULAR SURGERY PROCEDURE UNLIST  2013    IVC filter     Social History     Socioeconomic History    Marital status: SINGLE     Spouse name: Not on file    Number of children: Not on file    Years of education: Not on file    Highest education level: Not on file   Tobacco Use    Smoking status: Never Smoker    Smokeless tobacco: Never Used   Vaping Use    Vaping Use: Never used   Substance and Sexual Activity    Alcohol use: Yes     Alcohol/week: 8.3 standard drinks     Types: 10 Cans of beer per week    Drug use: No    Sexual activity: Yes     Partners: Female   Other Topics Concern     Service No    Blood Transfusions No    Caffeine Concern No    Occupational Exposure No    Hobby Hazards No    Sleep Concern No    Stress Concern No    Weight Concern No    Special Diet No    Back Care No    Exercise No    Bike Helmet No    Seat Belt No    Self-Exams No   Social History Narrative    ** Merged History Encounter **         ** Data from: 7/22/13 Enc Dept: [facility]         ** Data from: 7/3/13 Enc Dept: [facility] [place]. 10th Grade education from [employer] [facility] [employer]" program.    Has a total of 8 brothers and sisters. [facility] and 4 children. Hobbies: fishing. Social Determinants of Health     Financial Resource Strain:     Difficulty of Paying Living Expenses:    Food Insecurity:     Worried About Running Out of Food in the Last Year:     [address] in the Last Year:    Transportation Needs:     Lack of Transportation (Medical):      Lack of Transportation (Non-Medical):    Physical Activity:     Days of Exercise per Week:     Minutes of Exercise per Session:    Stress:     Feeling of Stress :    Social Connections:     Frequency of Communication with Friends and Family:     Frequency of Social Gatherings with Friends and Family:     Attends Faith Services:     Active Member of Clubs or Organizations:     Attends Club or Organization Meetings:     Marital Status:      Family History   Problem Relation Age of Onset    Alcohol abuse Father 21         of hepatic cirrhosis in his 45s. Was drinking 4-5 bottles of wine daily.  Seizures Father     Hypertension Mother [address] [facility]    COPD Mother     Sleep Apnea Mother      Current Outpatient Medications   Medication Sig Dispense Refill    lisinopriL (PRINIVIL, ZESTRIL) 20 mg tablet Take 1 Tablet by mouth daily. 90 Tablet 1    ergocalciferol (ERGOCALCIFEROL) 1,250 mcg (50,000 unit) capsule Take 1 Capsule by mouth every seven (7) days. 4 Capsule 3    docusate sodium (COLACE) 100 mg capsule Take 1 Cap by mouth two (2) times a day for 90 days. 60 Cap 2    Eliquis 5 mg tablet TAKE 1 TABLET BY MOUTH TWO (2) TIMES A DAY. INDICATIONS: DEEP VEIN THROMBOSIS PREVENTION 180 Tab 1    levETIRAcetam (KEPPRA) 750 mg tablet Take 2 Tabs by mouth two (2) times a day. 120 Tab 6    tadalafiL (CIALIS) 20 mg tablet Take 1 Tab by mouth as needed for Erectile Dysfunction. 6 Tab 3    acetaminophen (TYLENOL EXTRA STRENGTH) 500 mg tablet Take 1-2 Tabs by mouth every eight (8) hours as needed for Pain. 30 Tab 0     No Known Allergies    Objective:  Visit Vitals  BP (!) 146/82 (BP 1 Location: Left upper arm, BP Patient Position: Sitting, BP Cuff Size: )   Pulse 87   Temp 98.2 °F (36.8 °C) (Temporal)   Resp 19   Ht 5' 4\" (1.626 m)   Wt 161 lb 3.2 oz (73.1 kg)   SpO2 98%   BMI 27.67 kg/m²     Physical Exam:   General appearance - alert, well appearing, and in no distress.  Pleasant  Mental status - alert, oriented to person, place, and time  EYE-EOMI  EsMouth - poor dentition  Neck - supple, no significant adenopathy   Chest - clear to auscultation, no wheezes, rales or rhonchi, symmetric air entry   Heart - normal rate, regular rhythm, normal S1, S2  Abdomen - soft, nontender, +bs   Ext-peripheral pulses normal, no pedal edema, no clubbing or cyanosis  Neuro -alert, oriented, normal speech, no focal findings or movement disorder noted        Results for orders placed or performed in visit on 02/03/21   CHLAMYDIA/GC PCR   Result Value Ref Range    Chlamydia trachomatis, RAUL Negative Negative    Neisseria gonorrhoeae, RAUL Negative Negative   VITAMIN D, 25 HYDROXY   Result Value Ref Range    VITAMIN D, 25-HYDROXY 39.1 30.0 - 937.9 ng/mL   METABOLIC PANEL, COMPREHENSIVE   Result Value Ref Range    Glucose 93 65 - 99 mg/dL    BUN 13 6 - 24 mg/dL    Creatinine 0.69 (L) 0.76 - 1.27 mg/dL    GFR est non- >59 mL/min/1.73    GFR est  >59 mL/min/1.73    BUN/Creatinine ratio 19 9 - 20    Sodium 136 134 - 144 mmol/L    Potassium 4.5 3.5 - 5.2 mmol/L    Chloride 99 96 - 106 mmol/L    CO2 23 20 - 29 mmol/L    Calcium 8.7 8.7 - 10.2 mg/dL    Protein, total 6.8 6.0 - 8.5 g/dL    Albumin 4.2 3.8 - 4.9 g/dL    GLOBULIN, TOTAL 2.6 1.5 - 4.5 g/dL    A-G Ratio 1.6 1.2 - 2.2    Bilirubin, total 0.5 0.0 - 1.2 mg/dL    Alk. phosphatase 73 39 - 117 IU/L    AST (SGOT) 47 (H) 0 - 40 IU/L    ALT (SGPT) 49 (H) 0 - 44 IU/L   LIPID PANEL   Result Value Ref Range    Cholesterol, total 251 (H) 100 - 199 mg/dL    Triglyceride 227 (H) 0 - 149 mg/dL    HDL Cholesterol 70 >39 mg/dL    VLDL, calculated 40 5 - 40 mg/dL    LDL, calculated 141 (H) 0 - 99 mg/dL   HIV 1/2 AG/AB, 4TH GENERATION,W RFLX CONFIRM   Result Value Ref Range    HIV SCREEN 4TH GENERATION WRFX Non Reactive Non Reactive   CBC WITH AUTOMATED DIFF   Result Value Ref Range    WBC 5.7 3.4 - 10.8 x10E3/uL    RBC 4.73 4.14 - 5.80 x10E6/uL    HGB 14.5 13.0 - 17.7 g/dL    HCT 41.4 37.5 - 51.0 %    MCV 88 79 - 97 fL    MCH 30.7 26.6 - 33.0 pg    MCHC 35.0 31.5 - 35.7 g/dL    RDW 13.9 11.6 - 15.4 %    PLATELET 541 836 - 735 x10E3/uL    NEUTROPHILS 55 Not Estab. %    Lymphocytes 30 Not Estab. %    MONOCYTES 14 Not Estab. %    EOSINOPHILS 0 Not Estab. %    BASOPHILS 1 Not Estab. %    ABS.  NEUTROPHILS 3.1 1.4 - 7.0 x10E3/uL    Abs Lymphocytes 1.7 0.7 - 3.1 x10E3/uL    ABS. MONOCYTES 0.8 0.1 - 0.9 x10E3/uL    ABS. EOSINOPHILS 0.0 0.0 - 0.4 x10E3/uL    ABS. BASOPHILS 0.1 0.0 - 0.2 x10E3/uL    IMMATURE GRANULOCYTES 0 Not Estab. %    ABS. IMM. GRANS. 0.0 0.0 - 0.1 x10E3/uL   T VAGINALIS AMPLIFICATION   Result Value Ref Range    T. vaginalis by RAUL Negative Negative   PSA SCREENING (SCREENING)   Result Value Ref Range    Prostate Specific Ag 2.1 0.0 - 4.0 ng/mL   CVD REPORT   Result Value Ref Range    INTERPRETATION Note        Assessment/Plan:    ICD-10-CM ICD-9-CM    1. Essential hypertension  I10 401.9 lisinopriL (PRINIVIL, ZESTRIL) 20 mg tablet     Orders Placed This Encounter    lisinopriL (PRINIVIL, ZESTRIL) 20 mg tablet     Sig: Take 1 Tablet by mouth daily. Dispense:  90 Tablet     Refill:  1     Higher dose       1. Essential hypertension  Increase lisinopril    - lisinopriL (PRINIVIL, ZESTRIL) 20 mg tablet; Take 1 Tablet by mouth daily. Dispense: 90 Tablet; Refill: 1            There are no Patient Instructions on file for this visit. Follow-up and Dispositions    · Return in about 8 weeks (around 8/4/2021) for bp check. I have reviewed with the patient details of the assessment and plan and all questions were answered. Relevent patient education was performed. The most recent lab findings were reviewed with the patient. An After Visit Summary was printed and given to the patient.

## 2021-06-09 NOTE — PROGRESS NOTES
Chief Complaint   Patient presents with    Hypertension     1. Have you been to the ER, urgent care clinic since your last visit? Hospitalized since your last visit? Yes When: 3/11/21 [address] [place] [address] ED for melena    2. Have you seen or consulted any other health care providers outside of the [address] [place] [address] since your last visit? Include any pap smears or colon screening.  No

## 2021-06-23 DIAGNOSIS — I82.409 RECURRENT DEEP VEIN THROMBOSIS (DVT) (HCC): ICD-10-CM

## 2021-06-23 DIAGNOSIS — Z79.01 ANTICOAGULANT LONG-TERM USE: ICD-10-CM

## 2021-06-24 RX ORDER — APIXABAN 5 MG/1
TABLET, FILM COATED ORAL
Qty: 180 TABLET | Refills: 1 | Status: SHIPPED | OUTPATIENT
Start: 2021-06-24 | End: 2022-06-21

## 2021-07-12 ENCOUNTER — HOSPITAL ENCOUNTER (OUTPATIENT)
Dept: NEUROLOGY | Age: 56
Discharge: HOME | End: 2021-07-12
Attending: PSYCHIATRY & NEUROLOGY
Payer: MEDICAID

## 2021-07-12 DIAGNOSIS — G40.919 BREAKTHROUGH SEIZURE (HCC): ICD-10-CM

## 2021-07-12 DIAGNOSIS — G40.209 PARTIAL SYMPTOMATIC EPILEPSY WITH COMPLEX PARTIAL SEIZURES, NOT INTRACTABLE, WITHOUT STATUS EPILEPTICUS (HCC): ICD-10-CM

## 2021-07-12 PROCEDURE — 95816 EEG AWAKE AND DROWSY: CPT

## 2021-07-23 ENCOUNTER — OFFICE VISIT (OUTPATIENT)
Dept: NEUROLOGY | Age: 56
End: 2021-07-23
Payer: MEDICAID

## 2021-07-23 DIAGNOSIS — E55.9 VITAMIN D DEFICIENCY: ICD-10-CM

## 2021-07-23 DIAGNOSIS — F79 INTELLECTUAL DISABILITY: ICD-10-CM

## 2021-07-23 DIAGNOSIS — G40.209 PARTIAL SYMPTOMATIC EPILEPSY WITH COMPLEX PARTIAL SEIZURES, NOT INTRACTABLE, WITHOUT STATUS EPILEPTICUS (HCC): Primary | ICD-10-CM

## 2021-07-23 PROCEDURE — 99214 OFFICE O/P EST MOD 30 MIN: CPT | Performed by: PSYCHIATRY & NEUROLOGY

## 2021-07-23 NOTE — PROGRESS NOTES
Neurology Progress Note    NAME:  January Gibbs   :   1965   MRN:   [id number]     Date/Time:  2021  Subjective:   January Gibbs is a 54 y.o. male here today for follow-up for seizure disorder, test results. This is a phone visit. Patient's caregiver was by his side at the time of interview  Patient and caregiver said that he had 1 mild seizures since the last visit . She said that patient has been having some small seizures. Patient says he takes this medication as he still experiences tremors prescribed  EEG done on 2021 was unremarkable  I will obtain blood level of Keppra and possible meds, adjustment. He still experiences tremor but has not gotten any worse  I will continue patient on his current medication for now. Patient denies neck pain, chest pain, dysphagia, odynophagia, constipation or diarrhea. Review of Systems - General ROS: positive for  - fatigue  Psychological ROS: positive for - anxiety, concentration difficulties and depression  Ophthalmic ROS: positive for - decreased vision  ENT ROS: positive for -icterus  Allergy and Immunology ROS: negative  Hematological and Lymphatic ROS: negative  Endocrine ROS: negative  Respiratory ROS: no cough, shortness of breath, or wheezing  Cardiovascular ROS: no chest pain or dyspnea on exertion  Gastrointestinal ROS: no abdominal pain, change in bowel habits, or black or bloody stools  Genito-Urinary ROS: no dysuria, trouble voiding, or hematuria  Musculoskeletal ROS: positive for - joint pain, joint stiffness, joint swelling, muscle pain and muscular weakness  Neurological ROS: positive for - dizziness, headaches, impaired coordination/balance, seizures, visual changes and weakness  Dermatological ROS: negative      Medications reviewed:  Current Outpatient Medications   Medication Sig Dispense Refill    Eliquis 5 mg tablet TAKE 1 TABLET BY MOUTH TWO (2) TIMES A DAY.  INDICATIONS: DEEP VEIN THROMBOSIS PREVENTION 180 Tablet 1    lisinopriL (PRINIVIL, ZESTRIL) 20 mg tablet Take 1 Tablet by mouth daily. 90 Tablet 1    ergocalciferol (ERGOCALCIFEROL) 1,250 mcg (50,000 unit) capsule Take 1 Capsule by mouth every seven (7) days. 4 Capsule 3    levETIRAcetam (KEPPRA) 750 mg tablet Take 2 Tabs by mouth two (2) times a day. 120 Tab 6    tadalafiL (CIALIS) 20 mg tablet Take 1 Tab by mouth as needed for Erectile Dysfunction. 6 Tab 3    acetaminophen (TYLENOL EXTRA STRENGTH) 500 mg tablet Take 1-2 Tabs by mouth every eight (8) hours as needed for Pain. 30 Tab 0        Objective:   Vitals: There were no vitals filed for this visit.             Lab Data Reviewed:  Lab Results   Component Value Date/Time    WBC 5.7 02/03/2021 10:36 AM    HCT 41.4 02/03/2021 10:36 AM    HGB 14.5 02/03/2021 10:36 AM    PLATELET 624 79/87/6236 10:36 AM       Lab Results   Component Value Date/Time    Sodium 136 02/03/2021 10:36 AM    Potassium 4.5 02/03/2021 10:36 AM    Chloride 99 02/03/2021 10:36 AM    CO2 23 02/03/2021 10:36 AM    Glucose 93 02/03/2021 10:36 AM    BUN 13 02/03/2021 10:36 AM    Creatinine 0.69 (L) 02/03/2021 10:36 AM    Calcium 8.7 02/03/2021 10:36 AM       No components found for: TROPQUANT    No results found for: NITESH      Lab Results   Component Value Date/Time    Hemoglobin A1c 4.9 02/21/2017 02:09 PM        Lab Results   Component Value Date/Time    Vitamin B12 903 08/09/2018 01:18 PM    Folate 7.2 04/20/2015 08:41 AM       No results found for: ANA, Merribeth Schirmer, XBANA    Lab Results   Component Value Date/Time    Cholesterol, total 251 (H) 02/03/2021 10:36 AM    HDL Cholesterol 70 02/03/2021 10:36 AM    LDL, calculated 141 (H) 02/03/2021 10:36 AM    LDL, calculated 107 (H) 06/20/2018 11:55 AM    VLDL, calculated 40 02/03/2021 10:36 AM    VLDL, calculated 80 (H) 06/20/2018 11:55 AM    Triglyceride 227 (H) 02/03/2021 10:36 AM         CT Results (recent):  Results from Hospital Encounter encounter on 11/10/18    CT ABD SHADY [facility] CONT    Narrative  EXAM:  CT ABD PELV W CONT    INDICATION: painless jaundice    COMPARISON: None    CONTRAST:  100 mL of Isovue-370. TECHNIQUE:  Following the uneventful intravenous administration of contrast, thin axial  images were obtained through the abdomen and pelvis. Coronal and sagittal  reconstructions were generated. Oral contrast was not administered. CT dose  reduction was achieved through use of a standardized protocol tailored for this  examination and automatic exposure control for dose modulation. FINDINGS:  LUNG BASES: Clear. INCIDENTALLY IMAGED HEART AND MEDIASTINUM: Unremarkable. LIVER: No mass or biliary dilatation. GALLBLADDER: Contracted. No stones identified. SPLEEN: No mass. PANCREAS: No mass or ductal dilatation. ADRENALS: Unremarkable. KIDNEYS: 2 mm nonobstructing stone upper pole left kidney. 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 No mass or hydronephrosis. STOMACH: Unremarkable. SMALL BOWEL: No dilatation or wall thickening. COLON: No dilatation or wall thickening. APPENDIX: Unremarkable. PERITONEUM: No ascites or pneumoperitoneum. RETROPERITONEUM: No lymphadenopathy or aortic aneurysm. Trace atheromatous  calcifications. Infrarenal IVC filter noted. REPRODUCTIVE ORGANS: Enlarged, heterogeneous prostate gland. URINARY BLADDER: No mass or calculus. BONES: No destructive bone lesion. ADDITIONAL COMMENTS: Small fat-containing left inguinal hernia. Impression  IMPRESSION:    1. Normal-appearing liver and pancreas. No biliary ductal dilatation. 2. Enlarged, heterogeneously enhancing prostate gland. Correlate with PSA  values. 3. 2 mm nonobstructing stone upper pole left kidney. Bilateral duplicated  collecting systems. No hydronephrosis. MRI Results (recent):  Results from Abstract encounter on 06/26/15    MRI BRAIN W WO CONT      IR Results (recent):  No results found for this or any previous visit.       VAS/US Results (recent):  Results from Hospital Encounter encounter on 06/09/16    DUPLEX LOWER EXT VENOUS RIGHT      PHYSICAL EXAM  Deferred    Assesment  1. Partial symptomatic epilepsy with complex partial seizures, not intractable, without status epilepticus ([facility]  Continue Keppra    2. Intellectual disability  Stable  3. Vitamin D deficiency  Continue vitamin D2    ___________________________________________________  PLAN: Medication and plan discussed with patient      ICD-10-CM ICD-9-CM    1. Partial symptomatic epilepsy with complex partial seizures, not intractable, without status epilepticus ([facility]  G40.209 345.40    2. Intellectual disability  F79 319    3. Vitamin D deficiency  E55.9 268.9      Follow-up and Dispositions    · Return in about 3 months (around 10/23/2021).        About 35 minutes was spent with patient and his caregiver, more than half of the time used in answering questions and explaining the test.    ___________________________________________________    Attending Physician: Jamey Douglas MD

## 2021-08-06 ENCOUNTER — OFFICE VISIT (OUTPATIENT)
Dept: INTERNAL MEDICINE CLINIC | Age: 56
End: 2021-08-06
Payer: MEDICAID

## 2021-08-06 VITALS
WEIGHT: 161.5 LBS | RESPIRATION RATE: 19 BRPM | DIASTOLIC BLOOD PRESSURE: 75 MMHG | OXYGEN SATURATION: 96 % | SYSTOLIC BLOOD PRESSURE: 132 MMHG | BODY MASS INDEX: 27.57 KG/M2 | HEIGHT: 64 IN | TEMPERATURE: 98.2 F | HEART RATE: 80 BPM

## 2021-08-06 DIAGNOSIS — I10 ESSENTIAL HYPERTENSION: Primary | ICD-10-CM

## 2021-08-06 DIAGNOSIS — Z79.01 ANTICOAGULANT LONG-TERM USE: ICD-10-CM

## 2021-08-06 DIAGNOSIS — Z86.711 HISTORY OF PULMONARY EMBOLISM: ICD-10-CM

## 2021-08-06 DIAGNOSIS — I82.409 RECURRENT DEEP VEIN THROMBOSIS (DVT) (HCC): ICD-10-CM

## 2021-08-06 PROCEDURE — 99214 OFFICE O/P EST MOD 30 MIN: CPT | Performed by: INTERNAL MEDICINE

## 2021-08-06 RX ORDER — TRIAMCINOLONE ACETONIDE 1 MG/G
OINTMENT TOPICAL 2 TIMES DAILY
Qty: 30 G | Refills: 0 | Status: SHIPPED | OUTPATIENT
Start: 2021-08-06 | End: 2021-08-18
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
## 2021-08-06 NOTE — PROGRESS NOTES
Chief Complaint   Patient presents with    Blood Pressure Check     1. Have you been to the ER, urgent care clinic since your last visit? Hospitalized since your last visit? No    2. Have you seen or consulted any other health care providers outside of the [address] [place] [address] since your last visit? Include any pap smears or colon screening.  No

## 2021-08-15 ENCOUNTER — HOSPITAL ENCOUNTER (EMERGENCY)
Age: 56
Discharge: HOME | End: 2021-08-15
Attending: EMERGENCY MEDICINE
Payer: MEDICAID

## 2021-08-15 ENCOUNTER — APPOINTMENT (OUTPATIENT)
Dept: CT IMAGING | Age: 56
End: 2021-08-15
Attending: EMERGENCY MEDICINE
Payer: MEDICAID

## 2021-08-15 ENCOUNTER — APPOINTMENT (OUTPATIENT)
Dept: GENERAL RADIOLOGY | Age: 56
End: 2021-08-15
Attending: EMERGENCY MEDICINE
Payer: MEDICAID

## 2021-08-15 VITALS
WEIGHT: 161 LBS | DIASTOLIC BLOOD PRESSURE: 61 MMHG | TEMPERATURE: 98.4 F | HEART RATE: 120 BPM | BODY MASS INDEX: 27.64 KG/M2 | RESPIRATION RATE: 16 BRPM | OXYGEN SATURATION: 100 % | SYSTOLIC BLOOD PRESSURE: 117 MMHG

## 2021-08-15 DIAGNOSIS — S09.90XA RECENT HEAD TRAUMA, INITIAL ENCOUNTER: Primary | ICD-10-CM

## 2021-08-15 DIAGNOSIS — S01.01XA LACERATION OF SCALP, INITIAL ENCOUNTER: ICD-10-CM
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
## 2021-08-15 PROCEDURE — 99284 EMERGENCY DEPT VISIT MOD MDM: CPT

## 2021-08-15 PROCEDURE — 70486 CT MAXILLOFACIAL W/O DYE: CPT

## 2021-08-15 PROCEDURE — 90471 IMMUNIZATION ADMIN: CPT

## 2021-08-15 PROCEDURE — 90715 TDAP VACCINE 7 YRS/> IM: CPT | Performed by: EMERGENCY MEDICINE

## 2021-08-15 PROCEDURE — 74011250636 HC RX REV CODE- 250/636: Performed by: EMERGENCY MEDICINE

## 2021-08-15 PROCEDURE — 70450 CT HEAD/BRAIN W/O DYE: CPT

## 2021-08-15 PROCEDURE — 75810000275 HC EMERGENCY DEPT VISIT NO LEVEL OF CARE

## 2021-08-15 PROCEDURE — 72125 CT NECK SPINE W/O DYE: CPT

## 2021-08-15 PROCEDURE — 73090 X-RAY EXAM OF FOREARM: CPT

## 2021-08-15 PROCEDURE — 75810000294 HC INTERM/LAYERED WND RPR

## 2021-08-15 PROCEDURE — 74011250637 HC RX REV CODE- 250/637: Performed by: EMERGENCY MEDICINE

## 2021-08-15 PROCEDURE — 74011000250 HC RX REV CODE- 250: Performed by: EMERGENCY MEDICINE

## 2021-08-15 PROCEDURE — 96372 THER/PROPH/DIAG INJ SC/IM: CPT

## 2021-08-15 PROCEDURE — 71045 X-RAY EXAM CHEST 1 VIEW: CPT

## 2021-08-15 RX ORDER — ACETAMINOPHEN 325 MG/1
650 TABLET ORAL
Qty: 20 TABLET | Refills: 0 | Status: SHIPPED | OUTPATIENT
Start: 2021-08-15 | End: 2022-02-25 | Stop reason: ALTCHOICE

## 2021-08-15 RX ORDER — ACETAMINOPHEN 500 MG
1000 TABLET ORAL
Status: COMPLETED | OUTPATIENT
Start: 2021-08-15 | End: 2021-08-15

## 2021-08-15 RX ORDER — CEFAZOLIN SODIUM 1 G/3ML
1 INJECTION, POWDER, FOR SOLUTION INTRAMUSCULAR; INTRAVENOUS
Status: DISCONTINUED | OUTPATIENT
Start: 2021-08-15 | End: 2021-08-15 | Stop reason: SDUPTHER

## 2021-08-15 RX ORDER — LIDOCAINE HYDROCHLORIDE 10 MG/ML
10 INJECTION, SOLUTION EPIDURAL; INFILTRATION; INTRACAUDAL; PERINEURAL ONCE
Status: COMPLETED | OUTPATIENT
Start: 2021-08-15 | End: 2021-08-15

## 2021-08-15 RX ADMIN — CEFAZOLIN 1000 MG: 1 INJECTION, POWDER, FOR SOLUTION INTRAMUSCULAR; INTRAVENOUS at 16:05

## 2021-08-15 RX ADMIN — LIDOCAINE HYDROCHLORIDE 10 ML: 10 INJECTION, SOLUTION EPIDURAL; INFILTRATION; INTRACAUDAL; PERINEURAL at 14:47

## 2021-08-15 RX ADMIN — TETANUS TOXOID, REDUCED DIPHTHERIA TOXOID AND ACELLULAR PERTUSSIS VACCINE, ADSORBED 0.5 ML: 5; 2.5; 8; 8; 2.5 SUSPENSION INTRAMUSCULAR at 14:45

## 2021-08-15 RX ADMIN — ACETAMINOPHEN 1000 MG: 500 TABLET ORAL at 16:53

## 2021-08-15 NOTE — ED NOTES
Stapling of head wound completed. Dressing placed. Patient remains awake, alert and oriented x 4. Forensic Nurse evaluation completed.   To CT vis wheel chair
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
## 2021-08-15 NOTE — ED NOTES
Patient (s)   given copy of dc instructions and 1 script(s). Patient(s)   verbalized understanding of instructions and script (s). Patient given a current medication reconciliation form and verbalized understanding of their medications. Patient (s)  verbalized understanding of the importance of discussing medications with  his or her physician or clinic when they follow up. Patient alert and oriented and in no acute distress. Pt verbalizes pain scale of 2 out of 10. Patient discharged home ambulatory with self.

## 2021-08-15 NOTE — ED NOTES
Emergency Department Nursing Plan of Care       The Nursing Plan of Care is developed from the Nursing assessment and Emergency Department Attending provider initial evaluation. The plan of care may be reviewed in the ED Provider note.     The Plan of Care was developed with the following considerations:   Patient / Family readiness to learn indicated by:verbalized understanding  Persons(s) to be included in education: patient  Barriers to Learning/Limitations:No    Signed     Jarrett Ritchie RN    8/15/2021   2:32 PM

## 2021-08-17 ENCOUNTER — HOSPITAL ENCOUNTER (EMERGENCY)
Age: 56
Discharge: HOME | End: 2021-08-17
Attending: EMERGENCY MEDICINE | Admitting: EMERGENCY MEDICINE
Payer: MEDICAID

## 2021-08-17 VITALS
OXYGEN SATURATION: 99 % | RESPIRATION RATE: 18 BRPM | HEART RATE: 101 BPM | DIASTOLIC BLOOD PRESSURE: 73 MMHG | BODY MASS INDEX: 27.49 KG/M2 | WEIGHT: 161 LBS | TEMPERATURE: 98.1 F | SYSTOLIC BLOOD PRESSURE: 143 MMHG | HEIGHT: 64 IN

## 2021-08-17 DIAGNOSIS — S09.90XD TRAUMATIC HEAD INJURY WITH MULTIPLE LACERATIONS, SUBSEQUENT ENCOUNTER: ICD-10-CM

## 2021-08-17 DIAGNOSIS — Z51.89 VISIT FOR WOUND CHECK: Primary | ICD-10-CM

## 2021-08-17 DIAGNOSIS — S01.91XD TRAUMATIC HEAD INJURY WITH MULTIPLE LACERATIONS, SUBSEQUENT ENCOUNTER: ICD-10-CM

## 2021-08-17 PROCEDURE — 75810000275 HC EMERGENCY DEPT VISIT NO LEVEL OF CARE
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
## 2021-08-17 NOTE — ED TRIAGE NOTES
Pt needs staples removed from posterior head. Pt is alert and oriented x 4, RR even and unlabored, skin is warm and dry. Assessment completed and pt updated on plan of care. Call bell in reach. Emergency Department Nursing Plan of Care       The Nursing Plan of Care is developed from the Nursing assessment and Emergency Department Attending provider initial evaluation. The plan of care may be reviewed in the ED Provider note.     The Plan of Care was developed with the following considerations:   Patient / Family readiness to learn indicated by:verbalized understanding  Persons(s) to be included in education: patient  Barriers to Learning/Limitations:No    Signed     Stacia Larios RN    8/17/2021   9:07 AM

## 2021-08-25 ENCOUNTER — HOSPITAL ENCOUNTER (EMERGENCY)
Age: 56
Discharge: HOME | End: 2021-08-25
Attending: EMERGENCY MEDICINE
Payer: MEDICAID

## 2021-08-25 VITALS
SYSTOLIC BLOOD PRESSURE: 145 MMHG | RESPIRATION RATE: 18 BRPM | HEART RATE: 84 BPM | BODY MASS INDEX: 26.82 KG/M2 | DIASTOLIC BLOOD PRESSURE: 81 MMHG | HEIGHT: 65 IN | TEMPERATURE: 98.2 F | OXYGEN SATURATION: 100 % | WEIGHT: 161 LBS

## 2021-08-25 DIAGNOSIS — Z48.02 REMOVAL OF STAPLES: Primary | ICD-10-CM

## 2021-08-25 PROCEDURE — 75810000275 HC EMERGENCY DEPT VISIT NO LEVEL OF CARE

## 2021-08-25 PROCEDURE — 74011000250 HC RX REV CODE- 250: Performed by: NURSE PRACTITIONER

## 2021-08-25 RX ADMIN — BACITRACIN ZINC, NEOMYCIN SULFATE, POLYMYXIN B SULFATE 1 PACKET: 3.5; 5000; 4 OINTMENT TOPICAL at 11:11

## 2021-08-25 NOTE — ED TRIAGE NOTES
Pt here to have staples removed from head lac      Emergency [address] is developed from the Nursing assessment and Emergency Department Attending provider initial evaluation. The plan of care may be reviewed in the ED Provider note.     The Plan of Care was developed with the following considerations:   Patient / Family readiness to learn indicated by:verbalized understanding  Persons(s) to be included in education: patient  Barriers to Learning/Limitations:No    Signed     Giuliano Knight RN    8/25/2021   10:44 AM
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
## 2021-09-23 RX ORDER — ERGOCALCIFEROL 1.25 MG/1
50000 CAPSULE ORAL
Qty: 4 CAPSULE | Refills: 3 | Status: SHIPPED | OUTPATIENT
Start: 2021-09-23 | End: 2022-01-25

## 2021-11-02 DIAGNOSIS — I10 ESSENTIAL HYPERTENSION: ICD-10-CM

## 2021-11-03 RX ORDER — LISINOPRIL 20 MG/1
TABLET ORAL
Qty: 90 TABLET | Refills: 1 | Status: SHIPPED | OUTPATIENT
Start: 2021-11-03 | End: 2022-01-12 | Stop reason: DRUGHIGH

## 2021-11-03 RX ORDER — LISINOPRIL 10 MG/1
TABLET ORAL
Qty: 90 TABLET | Refills: 1 | OUTPATIENT
Start: 2021-11-03

## 2021-11-16 ENCOUNTER — APPOINTMENT (OUTPATIENT)
Dept: CT IMAGING | Age: 56
End: 2021-11-16
Attending: PHYSICIAN ASSISTANT
Payer: MEDICAID

## 2021-11-16 ENCOUNTER — HOSPITAL ENCOUNTER (EMERGENCY)
Age: 56
Discharge: HOME | End: 2021-11-16
Attending: EMERGENCY MEDICINE
Payer: MEDICAID

## 2021-11-16 VITALS
SYSTOLIC BLOOD PRESSURE: 151 MMHG | BODY MASS INDEX: 26.82 KG/M2 | HEART RATE: 97 BPM | OXYGEN SATURATION: 97 % | TEMPERATURE: 97.3 F | RESPIRATION RATE: 20 BRPM | WEIGHT: 161 LBS | DIASTOLIC BLOOD PRESSURE: 92 MMHG | HEIGHT: 65 IN

## 2021-11-16 DIAGNOSIS — S02.85XA CLOSED FRACTURE OF ORBIT, INITIAL ENCOUNTER (HCC): Primary | ICD-10-CM

## 2021-11-16 PROCEDURE — 70450 CT HEAD/BRAIN W/O DYE: CPT

## 2021-11-16 PROCEDURE — 74011250637 HC RX REV CODE- 250/637: Performed by: PHYSICIAN ASSISTANT

## 2021-11-16 PROCEDURE — 70486 CT MAXILLOFACIAL W/O DYE: CPT

## 2021-11-16 PROCEDURE — 99283 EMERGENCY DEPT VISIT LOW MDM: CPT

## 2021-11-16 RX ORDER — ACETAMINOPHEN 500 MG
1000 TABLET ORAL
Status: COMPLETED | OUTPATIENT
Start: 2021-11-16 | End: 2021-11-16

## 2021-11-16 RX ADMIN — ACETAMINOPHEN 1000 MG: 500 TABLET ORAL at 13:05

## 2021-11-16 NOTE — ED NOTES
Assumed pt care for task only. Discharge instructions were given to the patient by Valery Jerome RN. The patient left the Emergency Department ambulatory, alert and oriented and in no acute distress with 0 prescriptions. The patient was encouraged to call or return to the ED for worsening issues or problems and was encouraged to schedule a follow up appointment for continuing care. The patient verbalized understanding of discharge instructions and prescriptions, all questions were answered. The patient has no further concerns at this time.
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
## 2021-12-02 RX ORDER — LEVETIRACETAM 750 MG/1
1500 TABLET ORAL 2 TIMES DAILY
Qty: 120 TABLET | Refills: 6 | Status: SHIPPED | OUTPATIENT
Start: 2021-12-02 | End: 2022-07-21

## 2022-01-12 ENCOUNTER — OFFICE VISIT (OUTPATIENT)
Dept: INTERNAL MEDICINE CLINIC | Age: 57
End: 2022-01-12
Payer: MEDICAID

## 2022-01-12 VITALS
BODY MASS INDEX: 28.16 KG/M2 | DIASTOLIC BLOOD PRESSURE: 85 MMHG | OXYGEN SATURATION: 97 % | HEIGHT: 65 IN | TEMPERATURE: 98.2 F | HEART RATE: 84 BPM | RESPIRATION RATE: 19 BRPM | WEIGHT: 169 LBS | SYSTOLIC BLOOD PRESSURE: 151 MMHG

## 2022-01-12 DIAGNOSIS — R56.9 SEIZURES (HCC): ICD-10-CM

## 2022-01-12 DIAGNOSIS — Z23 NEEDS FLU SHOT: ICD-10-CM

## 2022-01-12 DIAGNOSIS — I82.409 RECURRENT DEEP VEIN THROMBOSIS (DVT) (HCC): ICD-10-CM

## 2022-01-12 DIAGNOSIS — I10 ESSENTIAL HYPERTENSION: Primary | ICD-10-CM

## 2022-01-12 DIAGNOSIS — Z79.01 ANTICOAGULANT LONG-TERM USE: ICD-10-CM

## 2022-01-12 PROCEDURE — 99214 OFFICE O/P EST MOD 30 MIN: CPT | Performed by: INTERNAL MEDICINE

## 2022-01-12 PROCEDURE — 90686 IIV4 VACC NO PRSV 0.5 ML IM: CPT | Performed by: INTERNAL MEDICINE

## 2022-01-12 RX ORDER — LOSARTAN POTASSIUM AND HYDROCHLOROTHIAZIDE 12.5; 5 MG/1; MG/1
1 TABLET ORAL DAILY
Qty: 30 TABLET | Refills: 2 | Status: SHIPPED | OUTPATIENT
Start: 2022-01-12 | End: 2022-04-13

## 2022-01-12 NOTE — PROGRESS NOTES
Chief Complaint   Patient presents with    Hypertension     1. Have you been to the ER, urgent care clinic since your last visit? Hospitalized since your last visit? No    2. Have you seen or consulted any other health care providers outside of the [address] [place] [address] since your last visit? Include any pap smears or colon screening. No     .After obtaining consent, and per orders of Dr. Luis M Horner, injection of Influenza given by Vannessa Underwood LPN. Patient instructed to remain in clinic for 10 minutes afterwards, and to report any adverse reaction to me immediately.
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
## 2022-01-24 ENCOUNTER — VIRTUAL VISIT (OUTPATIENT)
Dept: NEUROLOGY | Age: 57
End: 2022-01-24
Payer: MEDICAID

## 2022-01-24 DIAGNOSIS — G40.209 PARTIAL SYMPTOMATIC EPILEPSY WITH COMPLEX PARTIAL SEIZURES, NOT INTRACTABLE, WITHOUT STATUS EPILEPTICUS (HCC): Primary | ICD-10-CM

## 2022-01-24 DIAGNOSIS — F79 INTELLECTUAL DISABILITY: ICD-10-CM

## 2022-01-24 PROCEDURE — 99214 OFFICE O/P EST MOD 30 MIN: CPT | Performed by: PSYCHIATRY & NEUROLOGY
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
## 2022-01-25 RX ORDER — ERGOCALCIFEROL 1.25 MG/1
50000 CAPSULE ORAL
Qty: 4 CAPSULE | Refills: 3 | Status: SHIPPED | OUTPATIENT
Start: 2022-01-25 | End: 2022-06-21

## 2022-02-25 ENCOUNTER — OFFICE VISIT (OUTPATIENT)
Dept: INTERNAL MEDICINE CLINIC | Age: 57
End: 2022-02-25
Payer: MEDICAID

## 2022-02-25 VITALS
WEIGHT: 159 LBS | BODY MASS INDEX: 26.49 KG/M2 | TEMPERATURE: 98.3 F | RESPIRATION RATE: 19 BRPM | SYSTOLIC BLOOD PRESSURE: 136 MMHG | HEIGHT: 65 IN | DIASTOLIC BLOOD PRESSURE: 81 MMHG

## 2022-02-25 DIAGNOSIS — N52.9 ERECTILE DYSFUNCTION, UNSPECIFIED ERECTILE DYSFUNCTION TYPE: ICD-10-CM

## 2022-02-25 DIAGNOSIS — I10 ESSENTIAL HYPERTENSION: Primary | ICD-10-CM

## 2022-02-25 DIAGNOSIS — E78.1 HYPERTRIGLYCERIDEMIA: ICD-10-CM

## 2022-02-25 DIAGNOSIS — Z86.711 HISTORY OF PULMONARY EMBOLISM: ICD-10-CM

## 2022-02-25 DIAGNOSIS — F10.90 HEAVY ALCOHOL USE: ICD-10-CM

## 2022-02-25 DIAGNOSIS — I82.409 RECURRENT DEEP VEIN THROMBOSIS (DVT) (HCC): ICD-10-CM

## 2022-02-25 DIAGNOSIS — Z79.01 ANTICOAGULANT LONG-TERM USE: ICD-10-CM

## 2022-02-25 DIAGNOSIS — R56.9 SEIZURES (HCC): ICD-10-CM

## 2022-02-25 PROCEDURE — 99214 OFFICE O/P EST MOD 30 MIN: CPT | Performed by: INTERNAL MEDICINE

## 2022-02-25 RX ORDER — SILDENAFIL 100 MG/1
TABLET, FILM COATED ORAL
Qty: 5 TABLET | Refills: 5 | Status: SHIPPED | OUTPATIENT
Start: 2022-02-25

## 2022-02-25 NOTE — PROGRESS NOTES
Chief Complaint   Patient presents with    Hypertension    Claudication     edgardo inner thighs     1. Have you been to the ER, urgent care clinic since your last visit? Hospitalized since your last visit? No    2. Have you seen or consulted any other health care providers outside of the [address] [place] [address] since your last visit? Include any pap smears or colon screening.  No
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
## 2022-03-18 PROBLEM — E55.9 VITAMIN D DEFICIENCY: Status: ACTIVE | Noted: 2018-09-13

## 2022-03-19 PROBLEM — I82.409 RECURRENT DEEP VEIN THROMBOSIS (DVT) (HCC): Status: ACTIVE | Noted: 2019-01-23

## 2022-03-19 PROBLEM — F10.90 HEAVY ALCOHOL USE: Status: ACTIVE | Noted: 2019-07-31

## 2022-03-19 PROBLEM — E78.1 HYPERTRIGLYCERIDEMIA: Status: ACTIVE | Noted: 2018-06-26

## 2022-03-19 PROBLEM — R74.8 ELEVATED LIVER ENZYMES: Status: ACTIVE | Noted: 2019-07-31

## 2022-03-20 PROBLEM — D17.1 LIPOMA OF ABDOMINAL WALL: Status: ACTIVE | Noted: 2019-12-02

## 2022-03-20 PROBLEM — N40.0 ENLARGED PROSTATE WITHOUT LOWER URINARY TRACT SYMPTOMS (LUTS): Status: ACTIVE | Noted: 2018-11-12

## 2022-04-13 DIAGNOSIS — I10 ESSENTIAL HYPERTENSION: ICD-10-CM

## 2022-04-13 RX ORDER — LOSARTAN POTASSIUM AND HYDROCHLOROTHIAZIDE 12.5; 5 MG/1; MG/1
TABLET ORAL
Qty: 30 TABLET | Refills: 2 | Status: SHIPPED | OUTPATIENT
Start: 2022-04-13 | End: 2022-07-22 | Stop reason: SDUPTHER

## 2022-06-01 ENCOUNTER — OFFICE VISIT (OUTPATIENT)
Dept: INTERNAL MEDICINE CLINIC | Age: 57
End: 2022-06-01
Payer: MEDICAID

## 2022-06-01 VITALS
TEMPERATURE: 97 F | BODY MASS INDEX: 26.84 KG/M2 | SYSTOLIC BLOOD PRESSURE: 137 MMHG | DIASTOLIC BLOOD PRESSURE: 87 MMHG | HEART RATE: 86 BPM | OXYGEN SATURATION: 99 % | HEIGHT: 64 IN | WEIGHT: 157.2 LBS

## 2022-06-01 DIAGNOSIS — E78.1 HYPERTRIGLYCERIDEMIA: ICD-10-CM

## 2022-06-01 DIAGNOSIS — Z79.01 ANTICOAGULANT LONG-TERM USE: ICD-10-CM

## 2022-06-01 DIAGNOSIS — I82.409 RECURRENT DEEP VEIN THROMBOSIS (DVT) (HCC): ICD-10-CM

## 2022-06-01 DIAGNOSIS — Z86.711 HISTORY OF PULMONARY EMBOLISM: ICD-10-CM

## 2022-06-01 DIAGNOSIS — F10.90 HEAVY ALCOHOL USE: ICD-10-CM

## 2022-06-01 DIAGNOSIS — I10 ESSENTIAL HYPERTENSION: Primary | ICD-10-CM

## 2022-06-01 DIAGNOSIS — R56.9 SEIZURES (HCC): ICD-10-CM

## 2022-06-01 PROCEDURE — 99214 OFFICE O/P EST MOD 30 MIN: CPT | Performed by: INTERNAL MEDICINE

## 2022-06-01 NOTE — PROGRESS NOTES
Liudmila Castle is a 64 y.o. male and presents with Follow-up (3month seizure activity 2 days ago)  . Subjective: For bp f/u    Pt has not done his colonoscopy due to lack of transportation    Pt relays he may have had a seizure in his sleep as per his partner. 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    2)Seizure d/o-neuro @ [facility]    3)HTN-taking his medication daily   -  BP Readings from Last 3 Encounters:   06/01/22 137/87   02/25/22 136/81   01/12/22 (!) 151/85       4)Elevated LFTs-  Lab Results   Component Value Date/Time    ALT (SGPT) 49 (H) 02/03/2021 10:36 AM     (H) 07/31/2019 11:43 AM    Alk.  phosphatase 73 02/03/2021 10:36 AM    Bilirubin, direct 0.16 07/31/2019 11:43 AM    Bilirubin, total 0.5 02/03/2021 10:36 AM     Lab Results   Component Value Date/Time    Cholesterol, total 251 (H) 02/03/2021 10:36 AM    HDL Cholesterol 70 02/03/2021 10:36 AM    LDL, calculated 141 (H) 02/03/2021 10:36 AM    LDL, calculated 107 (H) 06/20/2018 11:55 AM    VLDL, calculated 40 02/03/2021 10:36 AM    VLDL, calculated 80 (H) 06/20/2018 11:55 AM    Triglyceride 227 (H) 02/03/2021 10:36 AM       Pt has admitted to drinking ~16 OUNCES OF BEER DAILY (less than previously)    Review of Systems  Constitutional: negative for fevers, chills, anorexia and weight loss  Respiratory:  negative for cough, hemoptysis, dyspnea,wheezing  CV:   negative for chest pain, palpitations, lower extremity edema  GI:   negative for nausea, vomiting, diarrhea, abdominal pain,melena  Integument:  negative for rash and pruritus  Hematologic:  negative for easy bruising and gum/nose bleeding  Musculoskel: negative for myalgias, arthralgias, back pain, muscle weakness, joint pain  Neurological:  negative for headaches, dizziness, vertigo, memory problems and gait   Behavl/Psych: negative for feelings of anxiety, depression, mood changes    Past Medical History:   Diagnosis Date    Anxiety disorder     Depression     DVT (deep venous thrombosis) ([facility] [address] 2013    Fatigue     Presence of IVC filter     Pulmonary embolism (HCC) 2013    Seizures ([facility] [address]     monthly seizures- sometimes several per month- managed by PCP at this time, per caregiver    Trauma     hit in head wiht board playing dominos      Past Surgical History:   Procedure Laterality Date    HX CRANIOTOMY  2006    Trauma repair    VASCULAR SURGERY PROCEDURE UNLIST      IVC filter     Social History     Socioeconomic History    Marital status: SINGLE   Tobacco Use    Smoking status: Never Smoker    Smokeless tobacco: Never Used   Vaping Use    Vaping Use: Never used   Substance and Sexual Activity    Alcohol use: Yes     Alcohol/week: 8.3 standard drinks     Types: 10 Cans of beer per week     Comment: occ    Drug use: No    Sexual activity: Yes     Partners: Female   Other Topics Concern     Service No    Blood Transfusions No    Caffeine Concern No    Occupational Exposure No    Hobby Hazards No    Sleep Concern No    Stress Concern No    Weight Concern No    Special Diet No    Back Care No    Exercise No    Bike Helmet No    Seat Belt No    Self-Exams No   Social History Narrative    ** Merged History Encounter **         ** Data from: 13 Enc Dept: [address] [place] [address]         ** Data from: 7/3/13 Enc Dept: [place]. 10th Grade education from [employer] program.    Has a total of 8 brothers and sisters. [facility] and 4 children. Hobbies: fishing. Family History   Problem Relation Age of Onset    Alcohol abuse Father 21         of hepatic cirrhosis in his 45s. Was drinking 4-5 bottles of wine daily.     Seizures Father     Hypertension Mother 61    COPD Mother     Sleep Apnea Mother      Current Outpatient Medications   Medication Sig Dispense Refill    losartan-hydroCHLOROthiazide (HYZAAR) 50-12.5 mg per tablet TAKE 1 TABLET BY MOUTH EVERY DAY 30 Tablet 2    sildenafil citrate (VIAGRA) 100 mg tablet 1/2-1 tab 15 minutes-4 hrs prior to intercourse. Max 1 tab/24 hrs 5 Tablet 5    ergocalciferol (ERGOCALCIFEROL) 1,250 mcg (50,000 unit) capsule TAKE 1 CAPSULE BY MOUTH EVERY SEVEN (7) DAYS. 4 Capsule 3    levETIRAcetam (KEPPRA) 750 mg tablet TAKE 2 TABS BY MOUTH TWO (2) TIMES A DAY. 120 Tablet 6    Eliquis 5 mg tablet TAKE 1 TABLET BY MOUTH TWO (2) TIMES A DAY. INDICATIONS: DEEP VEIN THROMBOSIS PREVENTION 180 Tablet 1    acetaminophen (TYLENOL EXTRA STRENGTH) 500 mg tablet Take 1-2 Tabs by mouth every eight (8) hours as needed for Pain. 30 Tab 0    triamcinolone acetonide (KENALOG) 0.1 % ointment APPLY TO AFFECTED AREA TWO (2) TIMES A DAY. USE THIN LAYER (Patient not taking: Reported on 6/1/2022) 30 g 1     No Known Allergies    Objective:  Visit Vitals  /87 (BP 1 Location: Left upper arm, BP Patient Position: Sitting, BP Cuff Size: Large adult)   Pulse 86   Temp 97 °F (36.1 °C) (Temporal)   Ht 5' 4\" (1.626 m)   Wt 157 lb 3.2 oz (71.3 kg)   SpO2 99%   BMI 26.98 kg/m²     Physical Exam:   General appearance - alert, well appearing, and in no distress.  Pleasant  Mental status - alert, oriented to person, place, and time  EYE-EOMI  EsMouth - poor dentition  Neck - supple, no significant adenopathy   Chest - clear to auscultation, no wheezes, rales or rhonchi, symmetric air entry   Heart - normal rate, regular rhythm, normal S1, S2  Abdomen - soft, nontender, +bs   Ext-peripheral pulses normal, no pedal edema, no clubbing or cyanosis  Neuro -alert, oriented, normal speech, no focal findings or movement disorder noted        Results for orders placed or performed in visit on 02/03/21   CHLAMYDIA/GC PCR   Result Value Ref Range    Chlamydia trachomatis, RAUL Negative Negative    Neisseria gonorrhoeae, RAUL Negative Negative   VITAMIN D, 25 HYDROXY   Result Value Ref Range    VITAMIN D, 25-HYDROXY 39.1 30.0 - 100.0 ng/mL METABOLIC PANEL, COMPREHENSIVE   Result Value Ref Range    Glucose 93 65 - 99 mg/dL    BUN 13 6 - 24 mg/dL    Creatinine 0.69 (L) 0.76 - 1.27 mg/dL    GFR est non- >59 mL/min/1.73    GFR est  >59 mL/min/1.73    BUN/Creatinine ratio 19 9 - 20    Sodium 136 134 - 144 mmol/L    Potassium 4.5 3.5 - 5.2 mmol/L    Chloride 99 96 - 106 mmol/L    CO2 23 20 - 29 mmol/L    Calcium 8.7 8.7 - 10.2 mg/dL    Protein, total 6.8 6.0 - 8.5 g/dL    Albumin 4.2 3.8 - 4.9 g/dL    GLOBULIN, TOTAL 2.6 1.5 - 4.5 g/dL    A-G Ratio 1.6 1.2 - 2.2    Bilirubin, total 0.5 0.0 - 1.2 mg/dL    Alk. phosphatase 73 39 - 117 IU/L    AST (SGOT) 47 (H) 0 - 40 IU/L    ALT (SGPT) 49 (H) 0 - 44 IU/L   LIPID PANEL   Result Value Ref Range    Cholesterol, total 251 (H) 100 - 199 mg/dL    Triglyceride 227 (H) 0 - 149 mg/dL    HDL Cholesterol 70 >39 mg/dL    VLDL, calculated 40 5 - 40 mg/dL    LDL, calculated 141 (H) 0 - 99 mg/dL   HIV 1/2 AG/AB, 4TH GENERATION,W RFLX CONFIRM   Result Value Ref Range    HIV SCREEN 4TH GENERATION WRFX Non Reactive Non Reactive   CBC WITH AUTOMATED DIFF   Result Value Ref Range    WBC 5.7 3.4 - 10.8 x10E3/uL    RBC 4.73 4.14 - 5.80 x10E6/uL    HGB 14.5 13.0 - 17.7 g/dL    HCT 41.4 37.5 - 51.0 %    MCV 88 79 - 97 fL    MCH 30.7 26.6 - 33.0 pg    MCHC 35.0 31.5 - 35.7 g/dL    RDW 13.9 11.6 - 15.4 %    PLATELET 105 385 - 385 x10E3/uL    NEUTROPHILS 55 Not Estab. %    Lymphocytes 30 Not Estab. %    MONOCYTES 14 Not Estab. %    EOSINOPHILS 0 Not Estab. %    BASOPHILS 1 Not Estab. %    ABS. NEUTROPHILS 3.1 1.4 - 7.0 x10E3/uL    Abs Lymphocytes 1.7 0.7 - 3.1 x10E3/uL    ABS. MONOCYTES 0.8 0.1 - 0.9 x10E3/uL    ABS. EOSINOPHILS 0.0 0.0 - 0.4 x10E3/uL    ABS. BASOPHILS 0.1 0.0 - 0.2 x10E3/uL    IMMATURE GRANULOCYTES 0 Not Estab. %    ABS. IMM.  GRANS. 0.0 0.0 - 0.1 x10E3/uL   T VAGINALIS AMPLIFICATION   Result Value Ref Range    T. vaginalis by RAUL Negative Negative   PSA SCREENING (SCREENING)   Result Value Ref Range Prostate Specific Ag 2.1 0.0 - 4.0 ng/mL   CVD REPORT   Result Value Ref Range    INTERPRETATION Note        1. Essential hypertension  Controlled on current regimen    2. Recurrent deep vein thrombosis (DVT) (HCC)  Noted    3. Anticoagulant long-term use  Cont eliquis    4. Seizures ([address]  Recent seizure  ? Ppt by alcohol    5. Heavy alcohol use  Pt has abstained    6. Hypertriglyceridemia  Improved    7. History of pulmonary embolism  Noted        There are no Patient Instructions on file for this visit. Follow-up and Dispositions    · Return in about 4 months (around 10/1/2022). I have reviewed with the patient details of the assessment and plan and all questions were answered. Relevent patient education was performed. The most recent lab findings were reviewed with the patient. An After Visit Summary was printed and given to the patient.

## 2022-06-01 NOTE — PROGRESS NOTES
Radha Alvarez is a 64 y.o. male  Chief Complaint   Patient presents with    Follow-up     3month seizure activity 2 days ago     1. Have you been to the ER, no urgent care clinic since your last visit? no Hospitalized since your last visit? no  2. Have you seen or consulted any other health care providers outside of the [address] [place] [address] since your last visit? no  Include any pap smears or colon screening.  no

## 2022-06-20 DIAGNOSIS — Z79.01 ANTICOAGULANT LONG-TERM USE: ICD-10-CM

## 2022-06-20 DIAGNOSIS — I82.409 RECURRENT DEEP VEIN THROMBOSIS (DVT) (HCC): ICD-10-CM

## 2022-06-21 RX ORDER — APIXABAN 5 MG/1
TABLET, FILM COATED ORAL
Qty: 180 TABLET | Refills: 1 | Status: SHIPPED | OUTPATIENT
Start: 2022-06-21 | End: 2022-10-11

## 2022-06-21 RX ORDER — ERGOCALCIFEROL 1.25 MG/1
50000 CAPSULE ORAL
Qty: 4 CAPSULE | Refills: 3 | Status: SHIPPED | OUTPATIENT
Start: 2022-06-21 | End: 2022-10-12

## 2022-06-22 DIAGNOSIS — E78.00 PURE HYPERCHOLESTEROLEMIA: Primary | ICD-10-CM

## 2022-06-22 RX ORDER — ATORVASTATIN CALCIUM 20 MG/1
20 TABLET, FILM COATED ORAL DAILY
Qty: 60 TABLET | Refills: 1 | Status: SHIPPED | OUTPATIENT
Start: 2022-06-22

## 2022-07-21 RX ORDER — LEVETIRACETAM 750 MG/1
TABLET ORAL
Qty: 120 TABLET | Refills: 6 | Status: SHIPPED | OUTPATIENT
Start: 2022-07-21

## 2022-07-22 DIAGNOSIS — I10 ESSENTIAL HYPERTENSION: ICD-10-CM

## 2022-07-22 RX ORDER — LOSARTAN POTASSIUM AND HYDROCHLOROTHIAZIDE 12.5; 5 MG/1; MG/1
1 TABLET ORAL DAILY
Qty: 90 TABLET | Refills: 3 | Status: SHIPPED | OUTPATIENT
Start: 2022-07-22

## 2022-10-11 DIAGNOSIS — I82.409 RECURRENT DEEP VEIN THROMBOSIS (DVT) (HCC): ICD-10-CM

## 2022-10-11 DIAGNOSIS — Z79.01 ANTICOAGULANT LONG-TERM USE: ICD-10-CM

## 2022-10-11 RX ORDER — APIXABAN 5 MG/1
TABLET, FILM COATED ORAL
Qty: 180 TABLET | Refills: 1 | Status: SHIPPED | OUTPATIENT
Start: 2022-10-11

## 2022-10-12 RX ORDER — ERGOCALCIFEROL 1.25 MG/1
50000 CAPSULE ORAL
Qty: 4 CAPSULE | Refills: 3 | Status: SHIPPED | OUTPATIENT
Start: 2022-10-12

## 2022-11-18 ENCOUNTER — OFFICE VISIT (OUTPATIENT)
Dept: INTERNAL MEDICINE CLINIC | Age: 57
End: 2022-11-18
Payer: MEDICAID

## 2022-11-18 VITALS
BODY MASS INDEX: 28.17 KG/M2 | HEIGHT: 64 IN | RESPIRATION RATE: 20 BRPM | HEART RATE: 86 BPM | WEIGHT: 165 LBS | OXYGEN SATURATION: 93 % | TEMPERATURE: 98.1 F | SYSTOLIC BLOOD PRESSURE: 130 MMHG | DIASTOLIC BLOOD PRESSURE: 76 MMHG

## 2022-11-18 DIAGNOSIS — E78.00 PURE HYPERCHOLESTEROLEMIA: ICD-10-CM

## 2022-11-18 DIAGNOSIS — I82.409 RECURRENT DEEP VEIN THROMBOSIS (DVT) (HCC): ICD-10-CM

## 2022-11-18 DIAGNOSIS — R56.9 SEIZURES (HCC): ICD-10-CM

## 2022-11-18 DIAGNOSIS — Z79.01 ANTICOAGULANT LONG-TERM USE: ICD-10-CM

## 2022-11-18 DIAGNOSIS — Z23 ENCOUNTER FOR IMMUNIZATION: ICD-10-CM

## 2022-11-18 DIAGNOSIS — E78.1 HYPERTRIGLYCERIDEMIA: ICD-10-CM

## 2022-11-18 DIAGNOSIS — I10 ESSENTIAL HYPERTENSION: Primary | ICD-10-CM

## 2022-11-18 LAB
ALBUMIN SERPL-MCNC: 3.8 G/DL (ref 3.5–5)
ALBUMIN/GLOB SERPL: 0.9 {RATIO} (ref 1.1–2.2)
ALP SERPL-CCNC: 63 U/L (ref 45–117)
ALT SERPL-CCNC: 48 U/L (ref 12–78)
AST SERPL-CCNC: 41 U/L (ref 15–37)
BILIRUB DIRECT SERPL-MCNC: 0.2 MG/DL (ref 0–0.2)
BILIRUB SERPL-MCNC: 0.8 MG/DL (ref 0.2–1)
CHOLEST SERPL-MCNC: 261 MG/DL
GLOBULIN SER CALC-MCNC: 4.2 G/DL (ref 2–4)
HDLC SERPL-MCNC: 55 MG/DL
HDLC SERPL: 4.7 {RATIO} (ref 0–5)
LDLC SERPL CALC-MCNC: 148.2 MG/DL (ref 0–100)
PROT SERPL-MCNC: 8 G/DL (ref 6.4–8.2)
TRIGL SERPL-MCNC: 289 MG/DL (ref ?–150)
VLDLC SERPL CALC-MCNC: 57.8 MG/DL

## 2022-11-18 PROCEDURE — 90686 IIV4 VACC NO PRSV 0.5 ML IM: CPT | Performed by: INTERNAL MEDICINE

## 2022-11-18 PROCEDURE — 99214 OFFICE O/P EST MOD 30 MIN: CPT | Performed by: INTERNAL MEDICINE

## 2022-11-18 NOTE — PROGRESS NOTES
Chief Complaint   Patient presents with    Follow-up     4 month bp     Seizure     3 wks ago in the morning        1. \"Have you been to the ER, urgent care clinic since your last visit? Hospitalized since your last visit? \" No    2. \"Have you seen or consulted any other health care providers outside of the [address] [place] [address] since your last visit? \" No     3. For patients aged 39-70: Has the patient had a colonoscopy / FIT/ Cologuard? No      If the patient is female:    4. For patients aged 41-77: Has the patient had a mammogram within the past 2 years? NA - based on age or sex      11. For patients aged 21-65: Has the patient had a pap smear?  NA - based on age or sex

## 2022-11-18 NOTE — PROGRESS NOTES
Miley Youngblood is a 62 y.o. male and presents with Follow-up (4 month bp ) and Seizure (3 wks ago in the morning )  . Subjective: For bp f/u    Pt has not done his colonoscopy due to lack of transportation    Pt relays he may have had a seizure. He had an episode where he inappropriately undressed. He felt overwhelmingly hot. No LOC. Not witnessed    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    2)Seizure d/[facility]-neuro @ [facility]    3)HTN-taking his medication daily   -  BP Readings from Last 3 Encounters:   11/18/22 130/76   06/01/22 137/87   02/25/22 136/81       4)Elevated LFTs-  Lab Results   Component Value Date/Time    ALT (SGPT) 56 06/01/2022 10:55 AM     (H) 07/31/2019 11:43 AM    Alk. phosphatase 81 06/01/2022 10:55 AM    Bilirubin, direct 0.16 07/31/2019 11:43 AM    Bilirubin, total 1.1 (H) 06/01/2022 10:55 AM     Lab Results   Component Value Date/Time    Cholesterol, total 275 (H) 06/01/2022 10:55 AM    HDL Cholesterol 40 06/01/2022 10:55 AM    LDL,Direct 97 06/01/2022 10:55 AM    LDL, calculated Not calculated due to elevated triglyceride level 06/01/2022 10:55 AM    VLDL, calculated  06/01/2022 10:55 AM     Calculation not valid with this patient's other Lipid values.     Triglyceride 976 (H) 06/01/2022 10:55 AM    CHOL/HDL Ratio 6.9 (H) 06/01/2022 10:55 AM       Pt has admitted to drinking ~16 OUNCES OF BEER DAILY (less than previously)    Review of Systems  Constitutional: negative for fevers, chills, anorexia and weight loss  Respiratory:  negative for cough, hemoptysis, dyspnea,wheezing  CV:   negative for chest pain, palpitations, lower extremity edema  GI:   negative for nausea, vomiting, diarrhea, abdominal pain,melena  Integument:  negative for rash and pruritus  Hematologic:  negative for easy bruising and gum/nose bleeding  Musculoskel: negative for myalgias, arthralgias, back pain, muscle weakness, joint pain  Neurological:  negative for headaches, dizziness, vertigo, memory problems and gait   Behavl/Psych: negative for feelings of anxiety, depression, mood changes    Past Medical History:   Diagnosis Date    Anxiety disorder     Depression     DVT (deep venous thrombosis) ([facility] [address] 2013    Fatigue     Presence of IVC filter     Pulmonary embolism ([facility] [address] 2013    Seizures ([facility] [address]     monthly seizures- sometimes several per month- managed by PCP at this time, per caregiver    Trauma     hit in head wiht board playing dominos      Past Surgical History:   Procedure Laterality Date    HX CRANIOTOMY  2006    Trauma repair    VASCULAR SURGERY PROCEDURE UNLIST      IVC filter     Social History     Socioeconomic History    Marital status: SINGLE   Tobacco Use    Smoking status: Never    Smokeless tobacco: Never   Vaping Use    Vaping Use: Never used   Substance and Sexual Activity    Alcohol use: Yes     Alcohol/week: 8.3 standard drinks     Types: 10 Cans of beer per week     Comment: occ    Drug use: No    Sexual activity: Yes     Partners: Female   Other Topics Concern     Service No    Blood Transfusions No    Caffeine Concern No    Occupational Exposure No    Hobby Hazards No    Sleep Concern No    Stress Concern No    Weight Concern No    Special Diet No    Back Care No    Exercise No    Bike Helmet No    Seat Belt No    Self-Exams No   Social History Narrative    ** Merged History Encounter **         ** Data from: 13 Enc Dept: [facility] [place]          ** Data from: 7/3/13 [facility] Dept: [place]. 10th Grade education from [employer] in [employer]" program.    Has a total of 8 brothers and sisters. Single with gilrfriend and 4 children. Hobbies: fishing. Family History   Problem Relation Age of Onset    Alcohol abuse Father 21         of hepatic cirrhosis in his 45s. Was drinking 4-5 bottles of wine daily.     Seizures Father     Hypertension Mother 61    COPD Mother     Sleep Apnea Mother Current Outpatient Medications   Medication Sig Dispense Refill    ergocalciferol (ERGOCALCIFEROL) 1,250 mcg (50,000 unit) capsule TAKE 1 CAPSULE BY MOUTH EVERY SEVEN (7) DAYS. 4 Capsule 3    Eliquis 5 mg tablet TAKE 1 TABLET BY MOUTH TWO (2) TIMES A DAY. INDICATIONS: DEEP VEIN THROMBOSIS PREVENTION 180 Tablet 1    losartan-hydroCHLOROthiazide (HYZAAR) 50-12.5 mg per tablet Take 1 Tablet by mouth in the morning. 90 Tablet 3    levETIRAcetam (KEPPRA) 750 mg tablet TAKE 2 TABS BY MOUTH TWO TIMES A DAY. 120 Tablet 6    atorvastatin (LIPITOR) 20 mg tablet Take 1 Tablet by mouth daily. 60 Tablet 1    sildenafil citrate (VIAGRA) 100 mg tablet 1/2-1 tab 15 minutes-4 hrs prior to intercourse. Max 1 tab/24 hrs 5 Tablet 5    acetaminophen (TYLENOL EXTRA STRENGTH) 500 mg tablet Take 1-2 Tabs by mouth every eight (8) hours as needed for Pain. 30 Tab 0    triamcinolone acetonide (KENALOG) 0.1 % ointment APPLY TO AFFECTED AREA TWO (2) TIMES A DAY. USE THIN LAYER (Patient not taking: Reported on 6/1/2022) 30 g 1     No Known Allergies    Objective:  Visit Vitals  /76 (BP 1 Location: Right arm, BP Patient Position: Sitting, BP Cuff Size: Adult)   Pulse 86   Temp 98.1 °F (36.7 °C) (Temporal)   Resp 20   Ht 5' 4\" (1.626 m)   Wt 165 lb (74.8 kg)   SpO2 93%   BMI 28.32 kg/m²     Physical Exam:   General appearance - alert, well appearing, and in no distress.  Pleasant  Mental status - alert, oriented to person, place, and time  EYE-EOMI  EsMouth - poor dentition  Neck - supple, no significant adenopathy   Chest - clear to auscultation, no wheezes, rales or rhonchi, symmetric air entry   Heart - normal rate, regular rhythm, normal S1, S2  Abdomen - soft, nontender, +bs   Ext-peripheral pulses normal, no pedal edema, no clubbing or cyanosis  Neuro -alert, oriented, normal speech, no focal findings or movement disorder noted        Results for orders placed or performed in visit on 54/05/06   METABOLIC PANEL, COMPREHENSIVE Result Value Ref Range    Sodium 134 (L) 136 - 145 mmol/L    Potassium 3.6 3.5 - 5.1 mmol/L    Chloride 99 97 - 108 mmol/L    CO2 25 21 - 32 mmol/L    Anion gap 10 5 - 15 mmol/L    Glucose 112 (H) 65 - 100 mg/dL    BUN 13 6 - 20 MG/DL    Creatinine 0.88 0.70 - 1.30 MG/DL    BUN/Creatinine ratio 15 12 - 20      GFR est AA >60 >60 ml/min/1.73m2    GFR est non-AA >60 >60 ml/min/1.73m2    Calcium 9.0 8.5 - 10.1 MG/DL    Bilirubin, total 1.1 (H) 0.2 - 1.0 MG/DL    ALT (SGPT) 56 12 - 78 U/L    AST (SGOT) 55 (H) 15 - 37 U/L    Alk. phosphatase 81 45 - 117 U/L    Protein, total 7.8 6.4 - 8.2 g/dL    Albumin 3.7 3.5 - 5.0 g/dL    Globulin 4.1 (H) 2.0 - 4.0 g/dL    A-G Ratio 0.9 (L) 1.1 - 2.2     LIPID PANEL   Result Value Ref Range    Cholesterol, total 275 (H) <200 MG/DL    Triglyceride 976 (H) <150 MG/DL    HDL Cholesterol 40 MG/DL    LDL, calculated Not calculated due to elevated triglyceride level 0 - 100 MG/DL    VLDL, calculated  MG/DL     Calculation not valid with this patient's other Lipid values. CHOL/HDL Ratio 6.9 (H) 0.0 - 5.0     CBC WITH AUTOMATED DIFF   Result Value Ref Range    WBC 6.4 4.1 - 11.1 K/uL    RBC 5.14 4.10 - 5.70 M/uL    HGB 15.9 12.1 - 17.0 g/dL    HCT 44.9 36.6 - 50.3 %    MCV 87.4 80.0 - 99.0 FL    MCH 30.9 26.0 - 34.0 PG    MCHC 35.4 30.0 - 36.5 g/dL    RDW 12.7 11.5 - 14.5 %    PLATELET 644 174 - 414 K/uL    MPV 10.8 8.9 - 12.9 FL    NRBC 0.0 0  WBC    ABSOLUTE NRBC 0.00 0.00 - 0.01 K/uL    NEUTROPHILS 51 32 - 75 %    LYMPHOCYTES 34 12 - 49 %    MONOCYTES 14 (H) 5 - 13 %    EOSINOPHILS 0 0 - 7 %    BASOPHILS 1 0 - 1 %    IMMATURE GRANULOCYTES 0 0.0 - 0.5 %    ABS. NEUTROPHILS 3.3 1.8 - 8.0 K/UL    ABS. LYMPHOCYTES 2.2 0.8 - 3.5 K/UL    ABS. MONOCYTES 0.9 0.0 - 1.0 K/UL    ABS. EOSINOPHILS 0.0 0.0 - 0.4 K/UL    ABS. BASOPHILS 0.1 0.0 - 0.1 K/UL    ABS. IMM.  GRANS. 0.0 0.00 - 0.04 K/UL    DF AUTOMATED     THYROID CASCADE PROFILE   Result Value Ref Range    TSH 1.410 0.450 - 4.500 uIU/mL   LDL, DIRECT   Result Value Ref Range    LDL,Direct 97 0 - 100 mg/dl       1. Essential hypertension  Controlled on current regimen    2. Recurrent deep vein thrombosis (DVT) (HCC)  Noted    3. Anticoagulant long-term use  Cont eliquis    4. Seizures (HCC)  F/up neuro  Cont current regimen    5. Heavy alcohol use  Pt has abstained    6. Hypertriglyceridemia  Recheck    7. History of pulmonary embolism  Noted        There are no Patient Instructions on file for this visit. Follow-up and Dispositions    Return in about 6 months (around 5/18/2023). I have reviewed with the patient details of the assessment and plan and all questions were answered. Relevent patient education was performed. The most recent lab findings were reviewed with the patient. An After Visit Summary was printed and given to the patient.

## 2022-11-22 DIAGNOSIS — E78.00 PURE HYPERCHOLESTEROLEMIA: ICD-10-CM

## 2022-11-22 RX ORDER — ATORVASTATIN CALCIUM 20 MG/1
20 TABLET, FILM COATED ORAL DAILY
Qty: 90 TABLET | Refills: 3 | Status: SHIPPED | OUTPATIENT
Start: 2022-11-22

## 2023-02-15 RX ORDER — LEVETIRACETAM 750 MG/1
TABLET ORAL
Qty: 120 TABLET | Refills: 6 | Status: SHIPPED | OUTPATIENT
Start: 2023-02-15

## 2023-02-15 RX ORDER — ERGOCALCIFEROL 1.25 MG/1
50000 CAPSULE ORAL
Qty: 4 CAPSULE | Refills: 3 | OUTPATIENT
Start: 2023-02-15

## 2023-02-15 NOTE — TELEPHONE ENCOUNTER
Requested Prescriptions     Pending Prescriptions Disp Refills    levETIRAcetam (KEPPRA) 750 mg tablet 120 Tablet 6     Sig: TAKE 2 TABS BY MOUTH TWO TIMES A DAY. ergocalciferol (ERGOCALCIFEROL) 1,250 mcg (50,000 unit) capsule 4 Capsule 3     Sig: Take 1 Capsule by mouth every seven (7) days.     Last fill Keppra 7/21/22   Last fill Vitamin D 10/12/22 labs 2/3/21 (39.1)   Last visit Dr Denita Henley 1/24/22   Next visit KURT Acosta 3/8/23

## 2023-02-22 RX ORDER — ERGOCALCIFEROL 1.25 MG/1
50000 CAPSULE ORAL
Qty: 4 CAPSULE | Refills: 3 | OUTPATIENT
Start: 2023-02-22

## 2023-03-08 RX ORDER — ERGOCALCIFEROL 1.25 MG/1
50000 CAPSULE ORAL
Qty: 4 CAPSULE | Refills: 3 | OUTPATIENT
Start: 2023-03-08

## 2023-03-08 NOTE — TELEPHONE ENCOUNTER
Requested Prescriptions     Pending Prescriptions Disp Refills    ergocalciferol (ERGOCALCIFEROL) 1,250 mcg (50,000 unit) capsule 4 Capsule 3     Sig: Take 1 Capsule by mouth every seven (7) days.     Last fill 10/12/23   Last visit [facility]  1/24/22 Dr Michel Chilel   Next visit  5/25/23   Last level 9.6  2/3/21

## 2023-05-25 ENCOUNTER — OFFICE VISIT (OUTPATIENT)
Age: 58
End: 2023-05-25
Payer: COMMERCIAL

## 2023-05-25 VITALS
HEIGHT: 64 IN | HEART RATE: 80 BPM | RESPIRATION RATE: 16 BRPM | OXYGEN SATURATION: 98 % | TEMPERATURE: 98.7 F | WEIGHT: 173.8 LBS | SYSTOLIC BLOOD PRESSURE: 130 MMHG | DIASTOLIC BLOOD PRESSURE: 70 MMHG | BODY MASS INDEX: 29.67 KG/M2

## 2023-05-25 DIAGNOSIS — G40.919 INTRACTABLE EPILEPSY WITHOUT STATUS EPILEPTICUS, UNSPECIFIED EPILEPSY TYPE (HCC): ICD-10-CM

## 2023-05-25 DIAGNOSIS — G40.919 INTRACTABLE EPILEPSY WITHOUT STATUS EPILEPTICUS, UNSPECIFIED EPILEPSY TYPE (HCC): Primary | ICD-10-CM

## 2023-05-25 DIAGNOSIS — F79 UNSPECIFIED INTELLECTUAL DISABILITIES: ICD-10-CM

## 2023-05-25 PROCEDURE — 3078F DIAST BP <80 MM HG: CPT

## 2023-05-25 PROCEDURE — 3075F SYST BP GE 130 - 139MM HG: CPT

## 2023-05-25 PROCEDURE — 99215 OFFICE O/P EST HI 40 MIN: CPT

## 2023-05-25 RX ORDER — LEVETIRACETAM 750 MG/1
TABLET ORAL
Qty: 180 TABLET | Refills: 3 | Status: SHIPPED | OUTPATIENT
Start: 2023-05-25

## 2023-05-25 RX ORDER — CENOBAMATE 12.5-25MG
KIT ORAL
Qty: 1 EACH | Refills: 0 | Status: SHIPPED | OUTPATIENT
Start: 2023-05-25

## 2023-05-25 RX ORDER — DIAZEPAM 10 MG/100UL
SPRAY NASAL
Qty: 3 EACH | Refills: 3 | Status: SHIPPED | OUTPATIENT
Start: 2023-05-25

## 2023-05-25 ASSESSMENT — PATIENT HEALTH QUESTIONNAIRE - PHQ9
SUM OF ALL RESPONSES TO PHQ QUESTIONS 1-9: 0
SUM OF ALL RESPONSES TO PHQ QUESTIONS 1-9: 0
1. LITTLE INTEREST OR PLEASURE IN DOING THINGS: 0
SUM OF ALL RESPONSES TO PHQ QUESTIONS 1-9: 0
SUM OF ALL RESPONSES TO PHQ9 QUESTIONS 1 & 2: 0
SUM OF ALL RESPONSES TO PHQ QUESTIONS 1-9: 0
2. FEELING DOWN, DEPRESSED OR HOPELESS: 0

## 2023-05-25 ASSESSMENT — ENCOUNTER SYMPTOMS
EYES NEGATIVE: 1
GASTROINTESTINAL NEGATIVE: 1
ALLERGIC/IMMUNOLOGIC NEGATIVE: 1
RESPIRATORY NEGATIVE: 1

## 2023-05-25 NOTE — ASSESSMENT & PLAN NOTE
Patient was last seen in the office in June 2022. Since then, he verbalized experiencing 9 seizure-like episodes. The last one was on 5/18/2023. Patient and significant other was, staring like episode and sometimes can accompanied with convulsions and urinary incontinence. The usually last 3 to 5 minutes and patient goes back to baseline. Will not make any changes on Keppra. Patient is to continue to take Keppra 750 mg in which she will take 2 tablets twice a day. Will obtain lab to check Keppra level for drug monitoring and compliance. Patient took his last dose this morning at 8 AM.    Since patient's seizures are exacerbated by heat and it is getting ready to be summertime, will start patient on Xcopri in which she will start at 12.5 and titrate up to 50 mg. Valtoco was also ordered as a rescue medication for patient. Instructions on how to take medications and side effects were reviewed with patient and significant other. Will obtain 24-hour EEG to rule out any seizures like activities. Seizure safety was also discussed with patient including avoid spending too much time in hot temperatures. No driving, height/ladders, tub baths, pools, bodies of water, power tools until seizure free x 6 months. If you have another seizure and the shaking last under 5 minutes, you are not injured, and you return to yourself quickly, no need to call EMS, just call my office at the number above. If you have  A seizure and the shaking lasts longer than 5 minutes, you are hurt or you have multiple seizures back to back without returning to yourself then call EMS. Patient verbalized understanding and agreed to plan of care.

## 2023-05-25 NOTE — PATIENT INSTRUCTIONS
As per our discussion,    Overall, you are doing well physically. I will not make any changes on your Keppra. Continue to take Keppra 750 mg in which she will take 2 tablets in the morning and 2 tablets at night. I will also obtain lab to check your Keppra level. I will start you on a new medication called Xcopri to help to reduce the frequency of your seizures. Please follow the instructions closely by pharmacist.  They come in packs in which she would use 1 pack for 1 month and then another pack for another month. I will see you right back after you finish your last pack. I will also start you on another medication called Valtoco which is a rescue medication in which we would use as needed. Again, please follow the instructions. No driving, height/ladders, tub baths, pools, bodies of water, power tools until seizure free x 6 months  If you have another seizure and the shaking last under 5 minutes, you are not injured, and you return to yourself quickly, no need to call EMS, just call my office at the number above. If you have  A seizure and the shaking lasts longer than 5 minutes, you are hurt or you have multiple seizures back to back without returning to yourself then call EMS . It was a pleasure meeting you both today. I will see you back in 2 months. Please do not hesitate to reach out for any questions or concerns.
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
## 2023-05-29 LAB — LEVETIRACETAM SERPL-MCNC: 43.4 UG/ML (ref 10–40)

## 2023-05-30 NOTE — RESULT ENCOUNTER NOTE
Keppra level came slightly elevated. No intervention at this time. Patient is to continue to take his medication as prescribed.

## 2023-05-31 ENCOUNTER — TELEPHONE (OUTPATIENT)
Age: 58
End: 2023-05-31

## 2023-05-31 NOTE — TELEPHONE ENCOUNTER
----- Message from Weston Carrera, [address] [place] [address] Drive - NP sent at 5/30/2023  9:26 AM EDT -----  Keppra level came slightly elevated. No intervention at this time. Patient is to continue to take his medication as prescribed.

## 2023-06-02 ENCOUNTER — TELEPHONE (OUTPATIENT)
Age: 58
End: 2023-06-02

## 2023-06-02 RX ORDER — LACOSAMIDE 50 MG/1
50 TABLET ORAL 2 TIMES DAILY
Qty: 60 TABLET | Refills: 5 | Status: SHIPPED | OUTPATIENT
Start: 2023-06-02 | End: 2023-08-01

## 2023-06-02 NOTE — PROGRESS NOTES
Patient verbalized he continues to experience a lot of seizure episodes at the last visit despite being on Keppra 1500 twice a day. Xcopri with titration was ordered at the last visit but prescription was denied by patient's insurance. Will start patient on Vimpat 50 mg twice a day as an adjunctive therapy to see if that help manage his seizures better. Nurse to reach out to patient about the Xcopri denial and the start of new medication.

## 2023-06-02 NOTE — TELEPHONE ENCOUNTER
Xcopri denied     Review hyperlink at far right bottom of this notice that is identified as  \"Document\" - that is the denial letter from [employer]. PA Detail   View all authorizations for this medication   Denied  5/31/2023  9:04 PM  Case ID: [id number] Appeal supported: No   Note from payer: Your PA request has been denied. Additional information will be provided in the denial communication. (Message [phone]) Your PA request has been denied. Additional information will be provided in the denial communication. (Message 6463)   Payer:  [employer] Generic Payer    [phone]      Notes     Time User Attachment    Attachment received from payer.  5/31/2023  9:04 PM [employer] Prescription Prior Authorization Response Document

## 2023-06-05 ENCOUNTER — OFFICE VISIT (OUTPATIENT)
Facility: CLINIC | Age: 58
End: 2023-06-05
Payer: COMMERCIAL

## 2023-06-05 VITALS
WEIGHT: 172 LBS | HEART RATE: 91 BPM | TEMPERATURE: 98.3 F | SYSTOLIC BLOOD PRESSURE: 115 MMHG | HEIGHT: 64 IN | DIASTOLIC BLOOD PRESSURE: 70 MMHG | OXYGEN SATURATION: 98 % | BODY MASS INDEX: 29.37 KG/M2 | RESPIRATION RATE: 18 BRPM

## 2023-06-05 DIAGNOSIS — E78.1 PURE HYPERGLYCERIDEMIA: ICD-10-CM

## 2023-06-05 DIAGNOSIS — E78.00 PURE HYPERCHOLESTEROLEMIA, UNSPECIFIED: Primary | ICD-10-CM

## 2023-06-05 DIAGNOSIS — Z79.01 LONG TERM (CURRENT) USE OF ANTICOAGULANTS: ICD-10-CM

## 2023-06-05 DIAGNOSIS — Z12.11 COLON CANCER SCREENING: ICD-10-CM

## 2023-06-05 DIAGNOSIS — I10 ESSENTIAL (PRIMARY) HYPERTENSION: ICD-10-CM

## 2023-06-05 DIAGNOSIS — E78.00 PURE HYPERCHOLESTEROLEMIA, UNSPECIFIED: ICD-10-CM

## 2023-06-05 PROCEDURE — 3078F DIAST BP <80 MM HG: CPT | Performed by: INTERNAL MEDICINE

## 2023-06-05 PROCEDURE — 3074F SYST BP LT 130 MM HG: CPT | Performed by: INTERNAL MEDICINE

## 2023-06-05 PROCEDURE — 99214 OFFICE O/P EST MOD 30 MIN: CPT | Performed by: INTERNAL MEDICINE

## 2023-06-05 RX ORDER — LOSARTAN POTASSIUM AND HYDROCHLOROTHIAZIDE 12.5; 5 MG/1; MG/1
1 TABLET ORAL DAILY
Qty: 90 TABLET | Refills: 1 | Status: SHIPPED | OUTPATIENT
Start: 2023-06-05

## 2023-06-05 RX ORDER — ATORVASTATIN CALCIUM 20 MG/1
20 TABLET, FILM COATED ORAL DAILY
Qty: 90 TABLET | Refills: 1 | Status: SHIPPED | OUTPATIENT
Start: 2023-06-05

## 2023-06-05 SDOH — ECONOMIC STABILITY: INCOME INSECURITY: HOW HARD IS IT FOR YOU TO PAY FOR THE VERY BASICS LIKE FOOD, HOUSING, MEDICAL CARE, AND HEATING?: NOT HARD AT ALL

## 2023-06-05 SDOH — ECONOMIC STABILITY: FOOD INSECURITY: WITHIN THE PAST 12 MONTHS, YOU WORRIED THAT YOUR FOOD WOULD RUN OUT BEFORE YOU GOT MONEY TO BUY MORE.: NEVER TRUE

## 2023-06-05 SDOH — ECONOMIC STABILITY: HOUSING INSECURITY
IN THE LAST 12 MONTHS, WAS THERE A TIME WHEN YOU DID NOT HAVE A STEADY PLACE TO SLEEP OR SLEPT IN A SHELTER (INCLUDING NOW)?: NO

## 2023-06-05 SDOH — ECONOMIC STABILITY: FOOD INSECURITY: WITHIN THE PAST 12 MONTHS, THE FOOD YOU BOUGHT JUST DIDN'T LAST AND YOU DIDN'T HAVE MONEY TO GET MORE.: NEVER TRUE

## 2023-06-05 ASSESSMENT — PATIENT HEALTH QUESTIONNAIRE - PHQ9
SUM OF ALL RESPONSES TO PHQ QUESTIONS 1-9: 0
2. FEELING DOWN, DEPRESSED OR HOPELESS: 0
SUM OF ALL RESPONSES TO PHQ9 QUESTIONS 1 & 2: 0
1. LITTLE INTEREST OR PLEASURE IN DOING THINGS: 0
SUM OF ALL RESPONSES TO PHQ QUESTIONS 1-9: 0

## 2023-06-05 NOTE — PROGRESS NOTES
Brayan Osborn is a 62 y.o. male  HIPAA verified by two patient identifiers. Health Maintenance Due   Topic Date Due    Diabetes screen  Never done    Shingles vaccine (2 of 2) 10/03/2019    COVID-19 Vaccine (4 - Booster for Pfizer series) 02/24/2022     Chief Complaint   Patient presents with    Hypertension    6 Month Follow-Up     /70   Pulse 91   Temp 98.3 °F (36.8 °C) (Temporal)   Resp 18   Ht 5' 4\" (1.626 m)   Wt 172 lb (78 kg)   SpO2 98%   BMI 29.52 kg/m²     Pain Scale: 0 - No pain/10  Pain Location:   1. Have you been to the ER, urgent care clinic since your last visit? Hospitalized since your last visit? No    2. Have you seen or consulted any other health care providers outside of the [address] [place] [address] since your last visit? Include any pap smears or colon screening.  No
- 8.0 K/UL    ABS. LYMPHOCYTES 2.2 0.8 - 3.5 K/UL    ABS. MONOCYTES 0.9 0.0 - 1.0 K/UL    ABS. EOSINOPHILS 0.0 0.0 - 0.4 K/UL    ABS. BASOPHILS 0.1 0.0 - 0.1 K/UL    ABS. IMM. GRANS. 0.0 0.00 - 0.04 K/UL    DF AUTOMATED     THYROID CASCADE PROFILE   Result Value Ref Range    TSH 1.410 0.450 - 4.500 uIU/mL   LDL, DIRECT   Result Value Ref Range    LDL,Direct 97 0 - 100 mg/dl               There are no Patient Instructions on file for this visit. Follow-up and Dispositions    Return in about 6 months (around 5/18/2023). I have reviewed with the patient details of the assessment and plan and all questions were answered. Relevent patient education was performed. The most recent lab findings were reviewed with the patient. An After Visit Summary was printed and given to the patient.

## 2023-06-06 LAB
ALBUMIN SERPL-MCNC: 3.4 G/DL (ref 3.5–5)
ALBUMIN SERPL-MCNC: 3.4 G/DL (ref 3.5–5)
ALBUMIN/GLOB SERPL: 0.8 (ref 1.1–2.2)
ALBUMIN/GLOB SERPL: 0.8 (ref 1.1–2.2)
ALP SERPL-CCNC: 90 U/L (ref 45–117)
ALP SERPL-CCNC: 92 U/L (ref 45–117)
ALT SERPL-CCNC: 79 U/L (ref 12–78)
ALT SERPL-CCNC: 80 U/L (ref 12–78)
ANION GAP SERPL CALC-SCNC: 8 MMOL/L (ref 5–15)
AST SERPL-CCNC: 121 U/L (ref 15–37)
AST SERPL-CCNC: 124 U/L (ref 15–37)
BILIRUB DIRECT SERPL-MCNC: 0.2 MG/DL (ref 0–0.2)
BILIRUB SERPL-MCNC: 0.7 MG/DL (ref 0.2–1)
BILIRUB SERPL-MCNC: 0.7 MG/DL (ref 0.2–1)
BUN SERPL-MCNC: 16 MG/DL (ref 6–20)
BUN/CREAT SERPL: 16 (ref 12–20)
CALCIUM SERPL-MCNC: 9.1 MG/DL (ref 8.5–10.1)
CHLORIDE SERPL-SCNC: 102 MMOL/L (ref 97–108)
CHOLEST SERPL-MCNC: 176 MG/DL
CO2 SERPL-SCNC: 26 MMOL/L (ref 21–32)
CREAT SERPL-MCNC: 0.99 MG/DL (ref 0.7–1.3)
EST. AVERAGE GLUCOSE BLD GHB EST-MCNC: 91 MG/DL
GLOBULIN SER CALC-MCNC: 4.1 G/DL (ref 2–4)
GLOBULIN SER CALC-MCNC: 4.1 G/DL (ref 2–4)
GLUCOSE SERPL-MCNC: 101 MG/DL (ref 65–100)
HBA1C MFR BLD: 4.8 % (ref 4–5.6)
HDLC SERPL-MCNC: 42 MG/DL
HDLC SERPL: 4.2 (ref 0–5)
LDLC SERPL CALC-MCNC: ABNORMAL MG/DL (ref 0–100)
LDLC SERPL DIRECT ASSAY-MCNC: 61 MG/DL (ref 0–100)
POTASSIUM SERPL-SCNC: 4.4 MMOL/L (ref 3.5–5.1)
PROT SERPL-MCNC: 7.5 G/DL (ref 6.4–8.2)
PROT SERPL-MCNC: 7.5 G/DL (ref 6.4–8.2)
SODIUM SERPL-SCNC: 136 MMOL/L (ref 136–145)
TRIGL SERPL-MCNC: 445 MG/DL
VLDLC SERPL CALC-MCNC: ABNORMAL MG/DL

## 2023-08-24 DIAGNOSIS — G40.919 INTRACTABLE EPILEPSY WITHOUT STATUS EPILEPTICUS, UNSPECIFIED EPILEPSY TYPE (HCC): ICD-10-CM

## 2023-08-25 RX ORDER — LEVETIRACETAM 750 MG/1
TABLET ORAL
Qty: 120 TABLET | Refills: 4 | Status: SHIPPED | OUTPATIENT
Start: 2023-08-25

## 2023-10-05 ENCOUNTER — HOSPITAL ENCOUNTER (OUTPATIENT)
Facility: HOSPITAL | Age: 58
Discharge: HOME | End: 2023-10-08

## 2023-10-05 DIAGNOSIS — G40.919 INTRACTABLE EPILEPSY WITHOUT STATUS EPILEPTICUS, UNSPECIFIED EPILEPSY TYPE (HCC): ICD-10-CM

## 2023-10-05 NOTE — PROGRESS NOTES
Patient arrived to appointment but could not keep it due to no ride to return the device the following day. Patient states he did not know he had to where it home. Patient was provided outpatient scheduling number to get the test rescheduled.

## 2023-12-06 DIAGNOSIS — Z79.01 LONG TERM (CURRENT) USE OF ANTICOAGULANTS: ICD-10-CM

## 2023-12-06 DIAGNOSIS — I10 ESSENTIAL (PRIMARY) HYPERTENSION: ICD-10-CM

## 2023-12-06 NOTE — TELEPHONE ENCOUNTER
Last appointment: 06/05/2023 MD Mendez   Next appointment: 12/11/2023 MD Mendez   Previous refill encounter(s):   06/05/2023:  - Hyzaar #90 with 1 refill,   - Eliquis #90 with 3 refills (1 bid)     For Pharmacy Admin Tracking Only    Program: Medication Refill  Intervention Detail: New Rx: 2, reason: Patient Preference  Time Spent (min): 5      Requested Prescriptions     Pending Prescriptions Disp Refills    apixaban (ELIQUIS) 5 MG TABS tablet [Pharmacy Med Name: ELIQUIS 5 MG TABLET] 180 tablet 0     Sig: TAKE 1 TABLET BY MOUTH TWO (2) TIMES A DAY. INDICATIONS: DEEP VEIN THROMBOSIS PREVENTION    losartan-hydroCHLOROthiazide (HYZAAR) 50-12.5 MG per tablet [Pharmacy Med Name: LOSARTAN-HCTZ 50-12.5 MG TAB] 90 tablet 0     Sig: TAKE 1 TABLET BY MOUTH EVERY DAY

## 2023-12-07 RX ORDER — LOSARTAN POTASSIUM AND HYDROCHLOROTHIAZIDE 12.5; 5 MG/1; MG/1
1 TABLET ORAL DAILY
Qty: 90 TABLET | Refills: 3 | Status: SHIPPED | OUTPATIENT
Start: 2023-12-07

## 2023-12-14 DIAGNOSIS — E78.1 PURE HYPERGLYCERIDEMIA: ICD-10-CM

## 2023-12-14 RX ORDER — ATORVASTATIN CALCIUM 20 MG/1
20 TABLET, FILM COATED ORAL DAILY
Qty: 90 TABLET | Refills: 1 | Status: SHIPPED | OUTPATIENT
Start: 2023-12-14

## 2024-06-25 DIAGNOSIS — G40.919 INTRACTABLE EPILEPSY WITHOUT STATUS EPILEPTICUS, UNSPECIFIED EPILEPSY TYPE (HCC): ICD-10-CM

## 2024-06-25 RX ORDER — LACOSAMIDE 50 MG/1
50 TABLET ORAL 2 TIMES DAILY
Qty: 60 TABLET | Refills: 0 | Status: SHIPPED | OUTPATIENT
Start: 2024-06-25 | End: 2024-12-22

## 2024-07-15 DIAGNOSIS — E78.1 PURE HYPERGLYCERIDEMIA: ICD-10-CM

## 2024-07-16 RX ORDER — ATORVASTATIN CALCIUM 20 MG/1
20 TABLET, FILM COATED ORAL DAILY
Qty: 90 TABLET | Refills: 0 | Status: SHIPPED | OUTPATIENT
Start: 2024-07-16

## 2024-08-26 ENCOUNTER — TELEPHONE (OUTPATIENT)
Age: 59
End: 2024-08-26

## 2024-08-26 DIAGNOSIS — G40.919 INTRACTABLE EPILEPSY WITHOUT STATUS EPILEPTICUS, UNSPECIFIED EPILEPSY TYPE (HCC): ICD-10-CM

## 2024-08-26 RX ORDER — LEVETIRACETAM 750 MG/1
TABLET ORAL
Qty: 120 TABLET | Refills: 0 | Status: SHIPPED | OUTPATIENT
Start: 2024-08-26

## 2024-08-26 NOTE — TELEPHONE ENCOUNTER
Spoke with Haresh HUSAIN. She states she will do a 30 day fill with no refills. She states the patient should come in for an office visit within the next month to stay established as a patient and continue with further refills.Will call patient to notify them of this. Sent refill to provider for review.     Called and spoke with Natalia (patient's caregiver, on PHI). Scheduled appointment for next available. Advised her to keep this appointment so we can continue filling his medications. Informed her the Keppra would only be a 30 day supply. She verbalized understanding.

## 2024-08-26 NOTE — TELEPHONE ENCOUNTER
Last office visit: 05/25/2023  Next office visit: No future visit scheduled   Last med refill: 08/25/2023

## 2024-08-26 NOTE — TELEPHONE ENCOUNTER
Pt' caregiver, Natalia, called stating that Pt has run out of his seizure medication and has had multiple seizures. Natalia asked that we please get this medication sent to his pharmacy asa. Please advise.     levETIRAcetam (KEPPRA) 750 MG tablet     [facility]/pharmacy #[id number] - [place], [place] - [address] - P [phone] - F [phone]

## 2024-08-28 ENCOUNTER — OFFICE VISIT (OUTPATIENT)
Facility: CLINIC | Age: 59
End: 2024-08-28
Payer: COMMERCIAL

## 2024-08-28 VITALS
OXYGEN SATURATION: 98 % | RESPIRATION RATE: 19 BRPM | BODY MASS INDEX: 28.65 KG/M2 | WEIGHT: 167.8 LBS | HEIGHT: 64 IN | TEMPERATURE: 98.4 F | SYSTOLIC BLOOD PRESSURE: 133 MMHG | HEART RATE: 91 BPM | DIASTOLIC BLOOD PRESSURE: 66 MMHG

## 2024-08-28 DIAGNOSIS — G40.919 INTRACTABLE EPILEPSY WITHOUT STATUS EPILEPTICUS, UNSPECIFIED EPILEPSY TYPE (HCC): ICD-10-CM

## 2024-08-28 DIAGNOSIS — E78.1 HYPERTRIGLYCERIDEMIA: ICD-10-CM

## 2024-08-28 DIAGNOSIS — Z12.5 PROSTATE CANCER SCREENING: ICD-10-CM

## 2024-08-28 DIAGNOSIS — Z79.01 LONG TERM (CURRENT) USE OF ANTICOAGULANTS: ICD-10-CM

## 2024-08-28 DIAGNOSIS — Z12.11 COLON CANCER SCREENING: ICD-10-CM

## 2024-08-28 DIAGNOSIS — I10 ESSENTIAL (PRIMARY) HYPERTENSION: Primary | ICD-10-CM

## 2024-08-28 DIAGNOSIS — F10.90 HEAVY ALCOHOL USE: ICD-10-CM

## 2024-08-28 PROCEDURE — 99214 OFFICE O/P EST MOD 30 MIN: CPT | Performed by: INTERNAL MEDICINE

## 2024-08-28 PROCEDURE — 3078F DIAST BP <80 MM HG: CPT | Performed by: INTERNAL MEDICINE

## 2024-08-28 PROCEDURE — 3075F SYST BP GE 130 - 139MM HG: CPT | Performed by: INTERNAL MEDICINE

## 2024-08-28 RX ORDER — LACOSAMIDE 50 MG/1
50 TABLET ORAL 2 TIMES DAILY
Qty: 60 TABLET | Refills: 0 | Status: CANCELLED | OUTPATIENT
Start: 2024-08-28 | End: 2025-02-24

## 2024-08-28 RX ORDER — LOSARTAN POTASSIUM AND HYDROCHLOROTHIAZIDE 12.5; 5 MG/1; MG/1
1 TABLET ORAL DAILY
Qty: 90 TABLET | Refills: 3 | Status: SHIPPED | OUTPATIENT
Start: 2024-08-28

## 2024-08-28 RX ORDER — ATORVASTATIN CALCIUM 20 MG/1
20 TABLET, FILM COATED ORAL DAILY
Qty: 90 TABLET | Refills: 3 | Status: SHIPPED | OUTPATIENT
Start: 2024-08-28

## 2024-08-28 RX ORDER — LEVETIRACETAM 750 MG/1
TABLET ORAL
Qty: 120 TABLET | Refills: 0 | Status: CANCELLED | OUTPATIENT
Start: 2024-08-28

## 2024-08-28 SDOH — ECONOMIC STABILITY: FOOD INSECURITY: WITHIN THE PAST 12 MONTHS, YOU WORRIED THAT YOUR FOOD WOULD RUN OUT BEFORE YOU GOT MONEY TO BUY MORE.: NEVER TRUE

## 2024-08-28 SDOH — ECONOMIC STABILITY: FOOD INSECURITY: WITHIN THE PAST 12 MONTHS, THE FOOD YOU BOUGHT JUST DIDN'T LAST AND YOU DIDN'T HAVE MONEY TO GET MORE.: NEVER TRUE

## 2024-08-28 SDOH — ECONOMIC STABILITY: INCOME INSECURITY: HOW HARD IS IT FOR YOU TO PAY FOR THE VERY BASICS LIKE FOOD, HOUSING, MEDICAL CARE, AND HEATING?: NOT HARD AT ALL

## 2024-08-28 ASSESSMENT — ANXIETY QUESTIONNAIRES
3. WORRYING TOO MUCH ABOUT DIFFERENT THINGS: NOT AT ALL
2. NOT BEING ABLE TO STOP OR CONTROL WORRYING: NOT AT ALL
1. FEELING NERVOUS, ANXIOUS, OR ON EDGE: NOT AT ALL
5. BEING SO RESTLESS THAT IT IS HARD TO SIT STILL: NOT AT ALL
GAD7 TOTAL SCORE: 0
6. BECOMING EASILY ANNOYED OR IRRITABLE: NOT AT ALL
IF YOU CHECKED OFF ANY PROBLEMS ON THIS QUESTIONNAIRE, HOW DIFFICULT HAVE THESE PROBLEMS MADE IT FOR YOU TO DO YOUR WORK, TAKE CARE OF THINGS AT HOME, OR GET ALONG WITH OTHER PEOPLE: NOT DIFFICULT AT ALL
7. FEELING AFRAID AS IF SOMETHING AWFUL MIGHT HAPPEN: NOT AT ALL
4. TROUBLE RELAXING: NOT AT ALL

## 2024-08-28 ASSESSMENT — PATIENT HEALTH QUESTIONNAIRE - PHQ9
SUM OF ALL RESPONSES TO PHQ QUESTIONS 1-9: 0
SUM OF ALL RESPONSES TO PHQ QUESTIONS 1-9: 0
2. FEELING DOWN, DEPRESSED OR HOPELESS: NOT AT ALL
SUM OF ALL RESPONSES TO PHQ9 QUESTIONS 1 & 2: 0
SUM OF ALL RESPONSES TO PHQ QUESTIONS 1-9: 0
SUM OF ALL RESPONSES TO PHQ QUESTIONS 1-9: 0
1. LITTLE INTEREST OR PLEASURE IN DOING THINGS: NOT AT ALL

## 2024-08-28 NOTE — PROGRESS NOTES
Chief Complaint   Patient presents with    Medication Refill     Seizure meds     \"Have you been to the ER, urgent care clinic since your last visit?  Hospitalized since your last visit?\"    NO    “Have you seen or consulted any other health care providers [facility] of [facility] since your last visit?”    NO            Click Here for Release of Records Request  g  HIPPA confirmed by two patient identifiers.

## 2024-08-28 NOTE — PROGRESS NOTES
Zaki Long is a 58 y.o. male and presents with   Chief Complaint   Patient presents with    Medication Refill     Seizure meds       .  Subjective:    Last appt 6/2023    Pt has not completed his colonoscopy due to lack of transportation. [facility] requests to be [facility]        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    2)Seizure d/o-neuro @ [facility]    3)HTN-on losartan/hctz  BP Readings from Last 3 Encounters:   08/28/24 133/66   06/05/23 115/70   05/25/23 130/70          4)Elevated LFTs-  Lab Results   Component Value Date    ALT 80 (H) 06/05/2023    ALT 79 (H) 06/05/2023     (H) 06/05/2023     (H) 06/05/2023     (H) 07/31/2019    ALKPHOS 90 06/05/2023    ALKPHOS 92 06/05/2023    BILITOT 0.7 06/05/2023    BILITOT 0.7 06/05/2023      5)HLD- started on statin  Lab Results   Component Value Date    CHOL 176 06/05/2023    TRIG 445 (H) 06/05/2023    HDL 42 06/05/2023    LDL Not calculated due to elevated triglyceride level 06/05/2023    VLDL  06/05/2023     Calculation not valid with this patient's other Lipid values.    CHOLHDLRATIO 4.2 06/05/2023        No components found for: \"LDLCHOLESTEROL\", \"LDLCALC\"      Pt has admitted to drinking ~40 OUNCES OF BEER DAILY (less than previously)    Review of Systems  Constitutional: negative for fevers, chills, anorexia and weight loss  Respiratory:  negative for cough, hemoptysis, dyspnea,wheezing  CV:   negative for chest pain, palpitations, lower extremity edema  GI:   negative for nausea, vomiting, diarrhea, abdominal pain,melena  Integument:  negative for rash and pruritus  Hematologic:  negative for easy bruising and gum/nose bleeding  Musculoskel: negative for myalgias, arthralgias, back pain, muscle weakness, joint pain  Neurological:  negative for headaches, dizziness, vertigo, memory problems and gait   Behavl/Psych: negative for feelings of anxiety, depression, mood changes    Past Medical History:   Diagnosis Date    Anxiety disorder      THROMBOSIS PREVENTION, Disp: 180 tablet, Rfl: 3    atorvastatin (LIPITOR) 20 MG tablet, Take 1 tablet by mouth daily, Disp: 90 tablet, Rfl: 3    losartan-hydroCHLOROthiazide (HYZAAR) 50-12.5 MG per tablet, Take 1 tablet by mouth daily, Disp: 90 tablet, Rfl: 3    levETIRAcetam (KEPPRA) 750 MG tablet, TAKE 2 TABLETS BY MOUTH TWICE A DAY, Disp: 120 tablet, Rfl: 0    lacosamide (VIMPAT) 50 MG TABS tablet, Take 1 tablet by mouth 2 times daily for 360 doses. Max Daily Amount: 100 mg, Disp: 60 tablet, Rfl: 0    diazePAM, 20 MG Dose, (VALTOCO 20 MG DOSE) 2 x 10 MG/0.1ML LQPK, Please follow instruction on the prescription.  Use 1 spray in each nostril.  Can repeat in 4 hours after the initial dose.  Do not exceed 2 doses., Disp: 3 each, Rfl: 3    Cenobamate 14 x 50 MG & 14 x100 MG TBPK, Take 1 dose pack by mouth daily Max Daily Amount: 1 dose pack, Disp: 1 each, Rfl: 0    acetaminophen (TYLENOL) 500 MG tablet, Take 1-2 tablets by mouth every 8 hours as needed, Disp: , Rfl:     sildenafil (VIAGRA) 100 MG tablet, 1/2-1 tab 15 minutes-4 hrs prior to intercourse. Max 1 tab/24 hrs, Disp: , Rfl:     triamcinolone (KENALOG) 0.1 % ointment, Apply topically 2 times daily, Disp: , Rfl:     No Known Allergies    Objective:  Visit Vitals  Vitals:    08/28/24 1404   BP: 133/66   Pulse: 91   Resp:    Temp:    SpO2:        Physical Exam:   General appearance - alert, well appearing, and in no distress. Pleasant  Mental status - alert, oriented to person, place, and time  EYE-EOMI  EsMouth - poor dentition  Neck - supple, no significant adenopathy   Chest - clear to auscultation, no wheezes, rales or rhonchi, symmetric air entry   Heart - normal rate, regular rhythm, normal S1, S2  Abdomen - soft, nontender, +bs   Ext-peripheral pulses normal, no pedal edema, no clubbing or cyanosis  Neuro -alert, oriented, normal speech, no focal findings or movement disorder noted      A/P:  1. Essential (primary) hypertension  Controlled on current

## 2024-09-06 DIAGNOSIS — Z12.5 PROSTATE CANCER SCREENING: ICD-10-CM

## 2024-09-06 DIAGNOSIS — E78.1 HYPERTRIGLYCERIDEMIA: ICD-10-CM

## 2024-09-06 DIAGNOSIS — G40.919 INTRACTABLE EPILEPSY WITHOUT STATUS EPILEPTICUS, UNSPECIFIED EPILEPSY TYPE (HCC): ICD-10-CM

## 2024-09-06 LAB
ALBUMIN SERPL-MCNC: 3.8 G/DL (ref 3.5–5)
ALBUMIN/GLOB SERPL: 1 (ref 1.1–2.2)
ALP SERPL-CCNC: 126 U/L (ref 45–117)
ALT SERPL-CCNC: 59 U/L (ref 12–78)
ANION GAP SERPL CALC-SCNC: 3 MMOL/L (ref 2–12)
AST SERPL-CCNC: 101 U/L (ref 15–37)
BASOPHILS # BLD: 0.1 K/UL (ref 0–0.1)
BASOPHILS NFR BLD: 1 % (ref 0–1)
BILIRUB SERPL-MCNC: 1.4 MG/DL (ref 0.2–1)
BUN SERPL-MCNC: 13 MG/DL (ref 6–20)
BUN/CREAT SERPL: 15 (ref 12–20)
CALCIUM SERPL-MCNC: 9.2 MG/DL (ref 8.5–10.1)
CHLORIDE SERPL-SCNC: 102 MMOL/L (ref 97–108)
CHOLEST SERPL-MCNC: 169 MG/DL
CO2 SERPL-SCNC: 33 MMOL/L (ref 21–32)
CREAT SERPL-MCNC: 0.86 MG/DL (ref 0.7–1.3)
DIFFERENTIAL METHOD BLD: ABNORMAL
EOSINOPHIL # BLD: 0 K/UL (ref 0–0.4)
EOSINOPHIL NFR BLD: 0 % (ref 0–7)
ERYTHROCYTE [DISTWIDTH] IN BLOOD BY AUTOMATED COUNT: 12.9 % (ref 11.5–14.5)
GLOBULIN SER CALC-MCNC: 4 G/DL (ref 2–4)
GLUCOSE SERPL-MCNC: 113 MG/DL (ref 65–100)
HCT VFR BLD AUTO: 37.1 % (ref 36.6–50.3)
HDLC SERPL-MCNC: 64 MG/DL
HDLC SERPL: 2.6 (ref 0–5)
HGB BLD-MCNC: 13.1 G/DL (ref 12.1–17)
IMM GRANULOCYTES # BLD AUTO: 0 K/UL (ref 0–0.04)
IMM GRANULOCYTES NFR BLD AUTO: 0 % (ref 0–0.5)
LDLC SERPL CALC-MCNC: 53 MG/DL (ref 0–100)
LYMPHOCYTES # BLD: 2.4 K/UL (ref 0.8–3.5)
LYMPHOCYTES NFR BLD: 34 % (ref 12–49)
MCH RBC QN AUTO: 30.5 PG (ref 26–34)
MCHC RBC AUTO-ENTMCNC: 35.3 G/DL (ref 30–36.5)
MCV RBC AUTO: 86.5 FL (ref 80–99)
MONOCYTES # BLD: 1 K/UL (ref 0–1)
MONOCYTES NFR BLD: 14 % (ref 5–13)
NEUTS SEG # BLD: 3.4 K/UL (ref 1.8–8)
NEUTS SEG NFR BLD: 51 % (ref 32–75)
NRBC # BLD: 0 K/UL (ref 0–0.01)
NRBC BLD-RTO: 0 PER 100 WBC
PLATELET # BLD AUTO: 227 K/UL (ref 150–400)
PMV BLD AUTO: 11.8 FL (ref 8.9–12.9)
POTASSIUM SERPL-SCNC: 4.7 MMOL/L (ref 3.5–5.1)
PROT SERPL-MCNC: 7.8 G/DL (ref 6.4–8.2)
PSA SERPL-MCNC: 5.3 NG/ML (ref 0.01–4)
RBC # BLD AUTO: 4.29 M/UL (ref 4.1–5.7)
SODIUM SERPL-SCNC: 138 MMOL/L (ref 136–145)
TRIGL SERPL-MCNC: 260 MG/DL
VLDLC SERPL CALC-MCNC: 52 MG/DL
WBC # BLD AUTO: 6.9 K/UL (ref 4.1–11.1)

## 2024-09-30 DIAGNOSIS — G40.919 INTRACTABLE EPILEPSY WITHOUT STATUS EPILEPTICUS, UNSPECIFIED EPILEPSY TYPE (HCC): ICD-10-CM

## 2024-09-30 NOTE — TELEPHONE ENCOUNTER
Patient  caregiver, Natalia states patient is almost out of seizure medicine and needs a refill to last until their next appt on October 14th. They would like a refill of keppra.    Their pharmacy is [facility] on [address] Thank you.

## 2024-10-01 DIAGNOSIS — G40.919 INTRACTABLE EPILEPSY WITHOUT STATUS EPILEPTICUS, UNSPECIFIED EPILEPSY TYPE (HCC): ICD-10-CM

## 2024-10-01 RX ORDER — LEVETIRACETAM 750 MG/1
TABLET ORAL
Qty: 120 TABLET | Refills: 0 | Status: SHIPPED | OUTPATIENT
Start: 2024-10-01

## 2024-10-01 RX ORDER — LACOSAMIDE 50 MG/1
50 TABLET ORAL 2 TIMES DAILY
Qty: 60 TABLET | Refills: 0 | Status: SHIPPED | OUTPATIENT
Start: 2024-10-01 | End: 2025-03-30

## 2024-10-01 NOTE — TELEPHONE ENCOUNTER
Filled 6-25-24  Seen 5-25-23  Appt 10-14-24    Pt out of meds    Further refills once seen in office    (Keppra refill already sent by another nurse)

## 2024-10-01 NOTE — TELEPHONE ENCOUNTER
HIPAA Verified (if caller is someone other than patient): yes    Reason for Call:   Pt had seizure- ran out of seizure medication    Name of Medication:  levETIRAcetam (KEPPRA) 750 MG tablet     Pharmacy:  [facility]/pharmacy #[id number] - [facility] [address] - P [phone] - F [phone]     Details from Caller:  Natalia called stating that Pt ran out of his seizure medication and had a seizure this morning. States it did not last long and she did not wish to discuss further with a Nurse. Asked that we please send in Pt's seizure medication to his pharmacy today asap. Please advise.     Level 1 Calls - attempted to reach practice? N/A     Reason Call Marked High Priority (if applicable):   Pt out of seizure medication

## 2024-10-02 ENCOUNTER — TELEPHONE (OUTPATIENT)
Facility: CLINIC | Age: 59
End: 2024-10-02

## 2024-10-02 NOTE — TELEPHONE ENCOUNTER
Pt called to discuss elevated PSA. P then mentions blood in his stool. It is unclear if he has an appt @ [facility] for his colonoscopy- referral given in August.   Will repeat PSA @ [facility]

## 2024-10-14 ENCOUNTER — OFFICE VISIT (OUTPATIENT)
Age: 59
End: 2024-10-14
Payer: COMMERCIAL

## 2024-10-14 VITALS — DIASTOLIC BLOOD PRESSURE: 72 MMHG | SYSTOLIC BLOOD PRESSURE: 146 MMHG | OXYGEN SATURATION: 98 % | HEART RATE: 90 BPM

## 2024-10-14 DIAGNOSIS — F79 UNSPECIFIED INTELLECTUAL DISABILITIES: ICD-10-CM

## 2024-10-14 DIAGNOSIS — Z91.89 STROKE RISK: ICD-10-CM

## 2024-10-14 DIAGNOSIS — G40.919 INTRACTABLE EPILEPSY WITHOUT STATUS EPILEPTICUS, UNSPECIFIED EPILEPSY TYPE (HCC): Primary | ICD-10-CM

## 2024-10-14 PROCEDURE — 3078F DIAST BP <80 MM HG: CPT

## 2024-10-14 PROCEDURE — 3077F SYST BP >= 140 MM HG: CPT

## 2024-10-14 PROCEDURE — 99215 OFFICE O/P EST HI 40 MIN: CPT

## 2024-10-14 RX ORDER — LEVETIRACETAM 750 MG/1
TABLET ORAL
Qty: 180 TABLET | Refills: 2 | Status: SHIPPED | OUTPATIENT
Start: 2024-10-14

## 2024-10-14 RX ORDER — LACOSAMIDE 50 MG/1
50 TABLET ORAL 2 TIMES DAILY
Qty: 180 TABLET | Refills: 2 | Status: SHIPPED | OUTPATIENT
Start: 2024-10-14 | End: 2025-07-11

## 2024-10-14 NOTE — ASSESSMENT & PLAN NOTE
His stroke risk factors include hypertension, dyslipidemia, on Eliquis for DVT.    He is to continue on atorvastatin 20 mg, and a apixaban 5 mg twice a day.    Patient is to continue to follow-up with his primary care provider and other specialists for his other comorbid condition as appropriate.

## 2024-10-14 NOTE — PROGRESS NOTES
Seizure yesterday morning, 10/13, lasted about 1 min  Missed a couple doses    
mg/dL  Very High: Greater than or equal to 190 mg/dL      VLDL Cholesterol Calculated 09/06/2024 52  MG/DL Final    Chol/HDL Ratio 09/06/2024 2.6  0.0 - 5.0   Final          No Known Allergies     Current Outpatient Medications   Medication Sig    lacosamide (VIMPAT) 50 MG TABS tablet Take 1 tablet by mouth 2 times daily for 540 doses. Max Daily Amount: 100 mg    levETIRAcetam (KEPPRA) 750 MG tablet TAKE 2 TABLETS BY MOUTH TWICE A DAY    apixaban (ELIQUIS) 5 MG TABS tablet TAKE 1 TABLET BY MOUTH TWO (2) TIMES A DAY. INDICATIONS: DEEP VEIN THROMBOSIS PREVENTION    atorvastatin (LIPITOR) 20 MG tablet Take 1 tablet by mouth daily    losartan-hydroCHLOROthiazide (HYZAAR) 50-12.5 MG per tablet Take 1 tablet by mouth daily    diazePAM, 20 MG Dose, (VALTOCO 20 MG DOSE) 2 x 10 MG/0.1ML LQPK Please follow instruction on the prescription.  Use 1 spray in each nostril.  Can repeat in 4 hours after the initial dose.  Do not exceed 2 doses.    acetaminophen (TYLENOL) 500 MG tablet Take 1-2 tablets by mouth every 8 hours as needed    sildenafil (VIAGRA) 100 MG tablet 1/2-1 tab 15 minutes-4 hrs prior to intercourse. Max 1 tab/24 hrs    triamcinolone (KENALOG) 0.1 % ointment Apply topically 2 times daily     No current facility-administered medications for this visit.       Past medical history/surgical history, family history, and social history have been reviewed for today's visit      Review of Systems   Constitutional: Negative.    HENT: Negative.     Eyes: Negative.    Respiratory: Negative.     Cardiovascular: Negative.    Gastrointestinal: Negative.    Endocrine: Negative.    Genitourinary:         Sexual difficulty ans has been using Viagra   Musculoskeletal: Negative.         Cramps on the right lower extremity   Skin: Negative.    Allergic/Immunologic: Negative.    Neurological:  Positive for seizures (last seizure was yesterday morning).   Hematological: Negative.    Psychiatric/Behavioral: Negative.          A ten

## 2024-10-15 ENCOUNTER — CLINICAL DOCUMENTATION (OUTPATIENT)
Age: 59
End: 2024-10-15

## 2024-10-15 NOTE — ASSESSMENT & PLAN NOTE
Patient verbalized he has been experiencing seizures when he ran out of meds, however, he had a seizure yesterday while he was taking his medications.    The description of the event sounded like seizure    Will obtain a 24 hour EEG to rule out seizures.    Will order lab to check Keppra and Vimpat  level for drug monitoring and compliance.    We will not make any changes on his medications at this time.  Patient is to continue to take Keppra 750 mg in which he will take 2 tablets twice a day and Vimpat 50 mg twice per day.  Will make additional recommendation based on what labs and imaging show.      For seizure safety:  - Avoid spending too much time in hot temperatures.  - No driving, height/ladders, tub baths, pools, bodies of water, power tools until seizure free x 6 months  - If you have another seizure and the shaking last under 5 minutes, you are not injured, and you return to yourself quickly, no need to call EMS, just call my office at the number above.    - If you have  A seizure and the shaking lasts longer than 5 minutes, you are hurt or you have multiple seizures back to back without returning to yourself then call EMS.    The [facility]'s Seizure Policy states that a person must be seizure-free or blackout-free for at least six months before driving.     If a person is currently licensed and [facility] is notified that the person has experienced a seizure, loss of consciousness or blackout, [facility] will suspend the person's driving privilege for a period of six months from the date of the last episode.

## 2024-10-17 ENCOUNTER — HOSPITAL ENCOUNTER (EMERGENCY)
Facility: HOSPITAL | Age: 59
Discharge: HOME | End: 2024-10-17
Payer: COMMERCIAL

## 2024-10-17 VITALS
OXYGEN SATURATION: 98 % | DIASTOLIC BLOOD PRESSURE: 87 MMHG | BODY MASS INDEX: 28.94 KG/M2 | RESPIRATION RATE: 16 BRPM | HEIGHT: 64 IN | TEMPERATURE: 98.7 F | SYSTOLIC BLOOD PRESSURE: 156 MMHG | WEIGHT: 169.5 LBS | HEART RATE: 91 BPM

## 2024-10-17 DIAGNOSIS — H10.32 ACUTE BACTERIAL CONJUNCTIVITIS OF LEFT EYE: Primary | ICD-10-CM

## 2024-10-17 PROCEDURE — 99283 EMERGENCY DEPT VISIT LOW MDM: CPT

## 2024-10-17 PROCEDURE — 6370000000 HC RX 637 (ALT 250 FOR IP)

## 2024-10-17 RX ORDER — ERYTHROMYCIN 5 MG/G
OINTMENT OPHTHALMIC
Qty: 3.5 G | Refills: 0 | Status: SHIPPED | OUTPATIENT
Start: 2024-10-17

## 2024-10-17 RX ORDER — LEVOCETIRIZINE DIHYDROCHLORIDE 5 MG/1
5 TABLET, FILM COATED ORAL NIGHTLY
Qty: 30 TABLET | Refills: 0 | Status: SHIPPED | OUTPATIENT
Start: 2024-10-17

## 2024-10-17 RX ORDER — ERYTHROMYCIN 5 MG/G
OINTMENT OPHTHALMIC ONCE
Status: COMPLETED | OUTPATIENT
Start: 2024-10-17 | End: 2024-10-17

## 2024-10-17 RX ADMIN — ERYTHROMYCIN: 5 OINTMENT OPHTHALMIC at 12:10

## 2024-10-17 ASSESSMENT — PAIN - FUNCTIONAL ASSESSMENT: PAIN_FUNCTIONAL_ASSESSMENT: 0-10

## 2024-10-17 ASSESSMENT — PAIN DESCRIPTION - LOCATION: LOCATION: EYE

## 2024-10-17 ASSESSMENT — PAIN SCALES - GENERAL: PAINLEVEL_OUTOF10: 8

## 2024-10-17 ASSESSMENT — PAIN DESCRIPTION - ORIENTATION: ORIENTATION: LEFT
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
## 2024-10-17 NOTE — ED NOTES
Discharge instructions were given to the patient by Alma JOVEL. The patient left the Emergency Department ambulatory, alert and oriented and in no acute distress with 2 prescriptions. The patient was encouraged to call or return to the ED for worsening issues or problems and was encouraged to schedule a follow up appointment for continuing care. The patient verbalized understanding of discharge instructions and prescriptions, all questions were answered. The patient has no further concerns at this time.

## 2025-01-07 ENCOUNTER — HOSPITAL ENCOUNTER (EMERGENCY)
Facility: HOSPITAL | Age: 60
Discharge: HOME | End: 2025-01-07
Attending: EMERGENCY MEDICINE
Payer: COMMERCIAL

## 2025-01-07 VITALS
SYSTOLIC BLOOD PRESSURE: 158 MMHG | WEIGHT: 171 LBS | OXYGEN SATURATION: 93 % | TEMPERATURE: 100.2 F | RESPIRATION RATE: 18 BRPM | HEART RATE: 89 BPM | BODY MASS INDEX: 29.19 KG/M2 | HEIGHT: 64 IN | DIASTOLIC BLOOD PRESSURE: 80 MMHG

## 2025-01-07 DIAGNOSIS — Z48.02 ENCOUNTER FOR STAPLE REMOVAL: Primary | ICD-10-CM

## 2025-01-07 PROCEDURE — 6370000000 HC RX 637 (ALT 250 FOR IP): Performed by: STUDENT IN AN ORGANIZED HEALTH CARE EDUCATION/TRAINING PROGRAM

## 2025-01-07 PROCEDURE — 99283 EMERGENCY DEPT VISIT LOW MDM: CPT

## 2025-01-07 RX ORDER — CEPHALEXIN 500 MG/1
500 CAPSULE ORAL 4 TIMES DAILY
Qty: 28 CAPSULE | Refills: 0 | Status: SHIPPED | OUTPATIENT
Start: 2025-01-07 | End: 2025-01-14

## 2025-01-07 RX ADMIN — CEPHALEXIN 500 MG: 250 CAPSULE ORAL at 22:46

## 2025-01-07 ASSESSMENT — PAIN - FUNCTIONAL ASSESSMENT: PAIN_FUNCTIONAL_ASSESSMENT: NONE - DENIES PAIN

## 2025-01-08 NOTE — DISCHARGE INSTRUCTIONS
Take the antibiotics as prescribed, follow-up with your primary care doctor in several days to recheck your wound, return as needed or if worsening wound
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
## 2025-01-08 NOTE — ED NOTES
The patient was discharged home by Dr. Helms and jacob Solorzano in stable condition, accompanied by family. The patient is alert and oriented, is in no respiratory distress and has vital signs within normal limits . The patient's diagnosis, condition and treatment were explained to patient. The patient expressed understanding. No prescriptions given to pt. Work/school note given to pt. A discharge plan has been developed. A  was not involved in the process. Aftercare instructions were given to the patient.

## 2025-01-08 NOTE — ED TRIAGE NOTES
Pt ambulates to treatment area without any gait disturbances.  Pt states that he had staples put in the left side of his head 2 weeks ago and they are supposed to be removed on the 14th.  Pt states that he went hunting Saturday and put a \"skull cap on\" when he was told not to wear a hat.  Pt is concerned for infection

## 2025-01-08 NOTE — ED NOTES
Change of shift. Care of patient taken over from Dr. Galvan; H&P reviewed, handoff complete.    I removed 12 staples in the patient's left scalp.  Draining open wound although small dehiscence.  Will leave open, placed on Keflex, discharged in ED no acute distress.nontoxic appearance, recommend outpatient follow-up PCP for wound infection reeval.    LABORATORY TESTS:  No results found for this or any previous visit (from the past 12 hour(s)).    IMAGING RESULTS:   No orders to display       MEDICATIONS GIVEN:   Medications - No data to display    IMPRESSION:   1. Encounter for staple removal        PLAN:   PATIENT REFERRED TO:   Guera Mendez MD  [address] [place] [address]  [phone]          DISCHARGE MEDICATIONS:  New Prescriptions    CEPHALEXIN (KEFLEX) 500 MG CAPSULE    Take 1 capsule by mouth 4 times daily for 7 days     Return to the ED if worse    (Please note that portions of this note were completed with a voice recognition program.  Efforts were made to edit the dictations but occasionally words are mis-transcribed.)    Daniel Helms DO (electronically signed)       Daniel Helms DO  01/07/25 2225

## 2025-01-11 ENCOUNTER — HOSPITAL ENCOUNTER (EMERGENCY)
Facility: HOSPITAL | Age: 60
Discharge: HOME | End: 2025-01-11
Payer: COMMERCIAL

## 2025-01-11 VITALS
RESPIRATION RATE: 18 BRPM | WEIGHT: 171 LBS | HEIGHT: 64 IN | OXYGEN SATURATION: 97 % | SYSTOLIC BLOOD PRESSURE: 138 MMHG | DIASTOLIC BLOOD PRESSURE: 72 MMHG | HEART RATE: 83 BPM | BODY MASS INDEX: 29.19 KG/M2 | TEMPERATURE: 98.3 F

## 2025-01-11 DIAGNOSIS — S00.03XA CONTUSION OF SCALP, INITIAL ENCOUNTER: Primary | ICD-10-CM

## 2025-01-11 PROCEDURE — 99282 EMERGENCY DEPT VISIT SF MDM: CPT

## 2025-01-11 ASSESSMENT — ENCOUNTER SYMPTOMS
ROS SKIN COMMENTS: CONTUSION
SHORTNESS OF BREATH: 0
BACK PAIN: 0
ABDOMINAL PAIN: 0

## 2025-01-11 ASSESSMENT — PAIN - FUNCTIONAL ASSESSMENT: PAIN_FUNCTIONAL_ASSESSMENT: NONE - DENIES PAIN

## 2025-01-11 NOTE — ED NOTES
Pt presents to ED ambulatory concerns for infection on the top of his head. Pt reports blood oozing out while he was cleaning his wound. Pt states [facility] removed his staples 2 days ago.  Pt states boil developed with fluid and pus inside and increased in size. Pt wants to make sure he is not infected. Pt states that he is currently on his 3rd day of antibiotics. Pt denies fever or chills Pt is alert and oriented x 4, RR even and unlabored, skin is warm and dry. Assessment completed and pt updated on plan of care.  Call bell in reach.        Emergency Department Nursing Plan of Care       The Nursing Plan of Care is developed from the Nursing assessment and Emergency Department Attending provider initial evaluation.  The plan of care may be reviewed in the “ED Provider note”.    The Plan of Care was developed with the following considerations:   Patient / Family readiness to learn indicated by:verbalized understanding  Persons(s) to be included in education: patient  Barriers to Learning/Limitations:None    Signed
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
## 2025-01-11 NOTE — ED NOTES
Discharge instructions were given to the patient by Alma JOVEL. The patient left the Emergency Department ambulatory, alert and oriented and in no acute distress with 0 prescriptions. The patient was encouraged to call or return to the ED for worsening issues or problems and was encouraged to schedule a follow up appointment for continuing care. The patient verbalized understanding of discharge instructions and prescriptions, all questions were answered. The patient has no further concerns at this time.

## 2025-01-12 NOTE — ED PROVIDER NOTES
signed)    (Please note that parts of this dictation were completed with voice recognition software. Quite often unanticipated grammatical, syntax, homophones, and other interpretive errors are inadvertently transcribed by the computer software. Please disregards these errors. Please excuse any errors that have escaped final proofreading.)        Carly Coffman, JOSE ROBERTO - NP  01/11/25 2867

## 2025-02-19 ASSESSMENT — ENCOUNTER SYMPTOMS
RESPIRATORY NEGATIVE: 1
GASTROINTESTINAL NEGATIVE: 1
ALLERGIC/IMMUNOLOGIC NEGATIVE: 1
EYES NEGATIVE: 1

## 2025-02-20 ENCOUNTER — SCHEDULED TELEPHONE ENCOUNTER (OUTPATIENT)
Age: 60
End: 2025-02-20
Payer: COMMERCIAL

## 2025-02-20 ENCOUNTER — CLINICAL DOCUMENTATION (OUTPATIENT)
Age: 60
End: 2025-02-20

## 2025-02-20 DIAGNOSIS — Z91.89 STROKE RISK: ICD-10-CM

## 2025-02-20 DIAGNOSIS — F79 UNSPECIFIED INTELLECTUAL DISABILITIES: ICD-10-CM

## 2025-02-20 DIAGNOSIS — G40.919 INTRACTABLE EPILEPSY WITHOUT STATUS EPILEPTICUS, UNSPECIFIED EPILEPSY TYPE (HCC): Primary | ICD-10-CM

## 2025-02-20 PROCEDURE — 99214 OFFICE O/P EST MOD 30 MIN: CPT

## 2025-02-20 ASSESSMENT — PATIENT HEALTH QUESTIONNAIRE - PHQ9
SUM OF ALL RESPONSES TO PHQ QUESTIONS 1-9: 0
SUM OF ALL RESPONSES TO PHQ9 QUESTIONS 1 & 2: 0
1. LITTLE INTEREST OR PLEASURE IN DOING THINGS: NOT AT ALL
SUM OF ALL RESPONSES TO PHQ QUESTIONS 1-9: 0
SUM OF ALL RESPONSES TO PHQ QUESTIONS 1-9: 0
2. FEELING DOWN, DEPRESSED OR HOPELESS: NOT AT ALL
SUM OF ALL RESPONSES TO PHQ QUESTIONS 1-9: 0

## 2025-02-20 NOTE — ASSESSMENT & PLAN NOTE
Breakthrough seizure on 2/16/2025    Based on the description of patient's symptoms, this sounded like a seizure exacerbated by lack of sleep, exertion.    24 hour EEG and labs were ordered at the last visit and were not completed.      We will continue to monitor patient for this.  Will check labs again at the next visit.    Patient is to continue to take Keppra 1500 mg twice daily, and Vimpat 50 mg twice a day.      For seizure safety:  - Avoid spending too much time in hot temperatures.  - No driving, height/ladders, tub baths, pools, bodies of water, power tools until seizure free x 6 months  - If you have another seizure and the shaking last under 5 minutes, you are not injured, and you return to yourself quickly, no need to call EMS, just call my office at the number above.    - If you have  A seizure and the shaking lasts longer than 5 minutes, you are hurt or you have multiple seizures back to back without returning to yourself then call EMS.    The [facility]'s Seizure Policy states that a person must be seizure-free or blackout-free for at least six months before driving.     If a person is currently licensed and [facility] is notified that the person has experienced a seizure, loss of consciousness or blackout, [facility] will suspend the person's driving privilege for a period of six months from the date of the last episode.     Patient does not drive.

## 2025-02-20 NOTE — PROGRESS NOTES
1. Have you been to the ER, urgent care clinic since your last visit?  Hospitalized since your last visit?   Seen on 1/20/25    2. Have you seen or consulted any other health care providers outside of the [facility] since your last visit?  Include any pap smears or colon screening.   No.    BEST # [phone]    Chief Complaint   Patient presents with    Seizures     Follow up-had a seizure on this past Sunday          
6 - 20 MG/DL Final    Creatinine 09/06/2024 0.86  0.70 - 1.30 MG/DL Final    BUN/Creatinine Ratio 09/06/2024 15  12 - 20   Final    Est, Glom Filt Rate 09/06/2024 >90  >60 ml/min/1.73m2 Final    Comment:   Pediatric calculator link: https://www.kidney.org/professionals/kdoqi/gfr_calculatorped    These results are not intended for use in patients <18 years of age.    eGFR results are calculated without a race factor using  the 2021 CKD-EPI equation. Careful clinical correlation is recommended, particularly when comparing to results calculated using previous equations.  The CKD-EPI equation is less accurate in patients with extremes of muscle mass, extra-renal metabolism of creatinine, excessive creatine ingestion, or following therapy that affects renal tubular secretion.      Calcium 09/06/2024 9.2  8.5 - 10.1 MG/DL Final    Total Bilirubin 09/06/2024 1.4 (H)  0.2 - 1.0 MG/DL Final    ALT 09/06/2024 59  12 - 78 U/L Final    AST 09/06/2024 101 (H)  15 - 37 U/L Final    Alk Phosphatase 09/06/2024 126 (H)  45 - 117 U/L Final    Total Protein 09/06/2024 7.8  6.4 - 8.2 g/dL Final    Albumin 09/06/2024 3.8  3.5 - 5.0 g/dL Final    Globulin 09/06/2024 4.0  2.0 - 4.0 g/dL Final    Albumin/Globulin Ratio 09/06/2024 1.0 (L)  1.1 - 2.2   Final    Cholesterol, Total 09/06/2024 169  <200 MG/DL Final    Triglycerides 09/06/2024 260 (H)  <150 MG/DL Final    Based on NCEP-ATP III:  Triglycerides <150 mg/dL  is considered normal, 150-199 mg/dL  borderline high,  200-499 mg/dL high and  greater than or equal to 500 mg/dL very high.    HDL 09/06/2024 64  MG/DL Final    Based on NCEP ATP III, HDL Cholesterol <40 mg/dL is considered low and >60 mg/dL is elevated.    LDL Cholesterol 09/06/2024 53  0 - 100 MG/DL Final    Comment: Based on the NCEP-ATP: LDL-C concentrations are considered  optimal <100 mg/dL,  near optimal/above Normal 100-129 mg/dL  Borderline High: 130-159, High: 160-189 mg/dL  Very High: Greater than or equal to 190

## 2025-03-21 ENCOUNTER — OFFICE VISIT (OUTPATIENT)
Facility: CLINIC | Age: 60
End: 2025-03-21
Payer: COMMERCIAL

## 2025-03-21 VITALS
HEART RATE: 92 BPM | RESPIRATION RATE: 18 BRPM | OXYGEN SATURATION: 99 % | TEMPERATURE: 98.2 F | SYSTOLIC BLOOD PRESSURE: 149 MMHG | HEIGHT: 64 IN | WEIGHT: 174.6 LBS | DIASTOLIC BLOOD PRESSURE: 80 MMHG | BODY MASS INDEX: 29.81 KG/M2

## 2025-03-21 DIAGNOSIS — I10 PRIMARY HYPERTENSION: Primary | ICD-10-CM

## 2025-03-21 DIAGNOSIS — G40.919 INTRACTABLE EPILEPSY WITHOUT STATUS EPILEPTICUS, UNSPECIFIED EPILEPSY TYPE (HCC): ICD-10-CM

## 2025-03-21 DIAGNOSIS — E78.1 HYPERTRIGLYCERIDEMIA: ICD-10-CM

## 2025-03-21 DIAGNOSIS — Z86.711 HISTORY OF PULMONARY EMBOLISM: ICD-10-CM

## 2025-03-21 DIAGNOSIS — F10.90 HEAVY ALCOHOL USE: ICD-10-CM

## 2025-03-21 DIAGNOSIS — R97.20 ELEVATED PSA: ICD-10-CM

## 2025-03-21 DIAGNOSIS — G40.909 SEIZURE DISORDER (HCC): ICD-10-CM

## 2025-03-21 DIAGNOSIS — Z79.01 LONG TERM (CURRENT) USE OF ANTICOAGULANTS: ICD-10-CM

## 2025-03-21 PROCEDURE — 99214 OFFICE O/P EST MOD 30 MIN: CPT | Performed by: INTERNAL MEDICINE

## 2025-03-21 PROCEDURE — 3077F SYST BP >= 140 MM HG: CPT | Performed by: INTERNAL MEDICINE

## 2025-03-21 PROCEDURE — 3078F DIAST BP <80 MM HG: CPT | Performed by: INTERNAL MEDICINE

## 2025-03-21 RX ORDER — ACETAMINOPHEN 500 MG
500-1000 TABLET ORAL EVERY 8 HOURS PRN
Qty: 60 TABLET | Refills: 1 | Status: SHIPPED | OUTPATIENT
Start: 2025-03-21 | End: 2025-03-21

## 2025-03-21 RX ORDER — ATORVASTATIN CALCIUM 20 MG/1
20 TABLET, FILM COATED ORAL DAILY
Qty: 90 TABLET | Refills: 3 | Status: SHIPPED | OUTPATIENT
Start: 2025-03-21

## 2025-03-21 SDOH — ECONOMIC STABILITY: FOOD INSECURITY: WITHIN THE PAST 12 MONTHS, YOU WORRIED THAT YOUR FOOD WOULD RUN OUT BEFORE YOU GOT MONEY TO BUY MORE.: NEVER TRUE

## 2025-03-21 SDOH — ECONOMIC STABILITY: FOOD INSECURITY: WITHIN THE PAST 12 MONTHS, THE FOOD YOU BOUGHT JUST DIDN'T LAST AND YOU DIDN'T HAVE MONEY TO GET MORE.: NEVER TRUE

## 2025-03-21 ASSESSMENT — ANXIETY QUESTIONNAIRES
5. BEING SO RESTLESS THAT IT IS HARD TO SIT STILL: NOT AT ALL
6. BECOMING EASILY ANNOYED OR IRRITABLE: NOT AT ALL
GAD7 TOTAL SCORE: 0
3. WORRYING TOO MUCH ABOUT DIFFERENT THINGS: NOT AT ALL
2. NOT BEING ABLE TO STOP OR CONTROL WORRYING: NOT AT ALL
IF YOU CHECKED OFF ANY PROBLEMS ON THIS QUESTIONNAIRE, HOW DIFFICULT HAVE THESE PROBLEMS MADE IT FOR YOU TO DO YOUR WORK, TAKE CARE OF THINGS AT HOME, OR GET ALONG WITH OTHER PEOPLE: NOT DIFFICULT AT ALL
1. FEELING NERVOUS, ANXIOUS, OR ON EDGE: NOT AT ALL
7. FEELING AFRAID AS IF SOMETHING AWFUL MIGHT HAPPEN: NOT AT ALL
4. TROUBLE RELAXING: NOT AT ALL

## 2025-03-21 ASSESSMENT — PATIENT HEALTH QUESTIONNAIRE - PHQ9
SUM OF ALL RESPONSES TO PHQ QUESTIONS 1-9: 0
1. LITTLE INTEREST OR PLEASURE IN DOING THINGS: NOT AT ALL
SUM OF ALL RESPONSES TO PHQ QUESTIONS 1-9: 0
SUM OF ALL RESPONSES TO PHQ QUESTIONS 1-9: 0
2. FEELING DOWN, DEPRESSED OR HOPELESS: NOT AT ALL
SUM OF ALL RESPONSES TO PHQ QUESTIONS 1-9: 0

## 2025-03-21 NOTE — TELEPHONE ENCOUNTER
HIPAA Verified (if caller is someone other than patient): yes       Reason for Call:   Medication refill  Medication Name:     levETIRAcetam (KEPPRA) 750 MG tablet      Pharmacy (if different from pharmacy on file):   [id number] - [place], [place] - [address] [facility] - P [phone] - F [phone]     Prescribing Provider:   Haresh    Last Office Visit (must be within last 12 months - if not, schedule appointment then send refill encounter):   10/14/2024    Is patient completely out of medication?   Only 2 pills left      Message: (any additional details from patient/caller not covered above)  Patients caregiver  called to request a refill for levetiraetam

## 2025-03-21 NOTE — PROGRESS NOTES
Chief Complaint   Patient presents with    Hypertension     \"Have you been to the ER, urgent care clinic since your last visit?  Hospitalized since your last visit?\"    NO    “Have you seen or consulted any other health care providers outside our system since your last visit?”    NO      “Have you had a colorectal cancer screening such as a colonoscopy/FIT/Cologuard?    NO    Date of last Colonoscopy: 11/3/2014  No cologuard on file  No FIT/FOBT on file   No flexible sigmoidoscopy on file          HIPPA confirmed by two patient identifiers.    
      I have reviewed with the patient details of the assessment and plan and all questions were answered. Relevent patient education was performed.The most recent lab findings were reviewed with the patient.    An After Visit Summary was printed and given to the patient.    Guera Mendez MD

## 2025-03-23 ENCOUNTER — HOSPITAL ENCOUNTER (EMERGENCY)
Facility: HOSPITAL | Age: 60
Discharge: HOME | End: 2025-03-23
Payer: COMMERCIAL

## 2025-03-23 VITALS
HEIGHT: 65 IN | HEART RATE: 96 BPM | DIASTOLIC BLOOD PRESSURE: 69 MMHG | OXYGEN SATURATION: 98 % | WEIGHT: 174.6 LBS | RESPIRATION RATE: 18 BRPM | BODY MASS INDEX: 29.09 KG/M2 | TEMPERATURE: 99 F | SYSTOLIC BLOOD PRESSURE: 157 MMHG

## 2025-03-23 DIAGNOSIS — Z76.0 ENCOUNTER FOR MEDICATION REFILL: Primary | ICD-10-CM

## 2025-03-23 PROCEDURE — 6370000000 HC RX 637 (ALT 250 FOR IP): Performed by: NURSE PRACTITIONER

## 2025-03-23 PROCEDURE — 99283 EMERGENCY DEPT VISIT LOW MDM: CPT

## 2025-03-23 PROCEDURE — 36415 COLL VENOUS BLD VENIPUNCTURE: CPT

## 2025-03-23 PROCEDURE — 80177 DRUG SCRN QUAN LEVETIRACETAM: CPT

## 2025-03-23 RX ORDER — LEVETIRACETAM 500 MG/1
1500 TABLET ORAL
Status: COMPLETED | OUTPATIENT
Start: 2025-03-23 | End: 2025-03-23

## 2025-03-23 RX ORDER — LEVETIRACETAM 750 MG/1
1500 TABLET ORAL 2 TIMES DAILY
Qty: 120 TABLET | Refills: 0 | Status: SHIPPED | OUTPATIENT
Start: 2025-03-23 | End: 2025-03-24 | Stop reason: SDUPTHER

## 2025-03-23 RX ADMIN — LEVETIRACETAM 1500 MG: 500 TABLET, FILM COATED ORAL at 15:01

## 2025-03-23 ASSESSMENT — PAIN - FUNCTIONAL ASSESSMENT: PAIN_FUNCTIONAL_ASSESSMENT: NONE - DENIES PAIN

## 2025-03-23 NOTE — ED NOTES
Pt presents to ED w c/o med refill. Pt reports he ran out of keppra 750mg on Friday night. Pt reports he just saw PCP on Friday who was supposed to refill med but it was not available when he tried to pick it up this weekend. Pt denies any sx at this time. Pt is A+Ox4, RR even and unlabored, pt in NAD.
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
## 2025-03-23 NOTE — ED NOTES
Discharge instructions were given to the patient by Sofya Lo RN.  The patient left the Emergency Department alert and oriented and in no acute distress with 1 prescription(s). The patient was encouraged to call or return to the ED for worsening issues or problems and was encouraged to schedule a follow up appointment for continuing care.  The patient verbalized understanding of discharge instructions and prescriptions; all questions were answered. The patient has no further concerns at this time.

## 2025-03-23 NOTE — ED TRIAGE NOTES
Pt presents to the ED complaining of needing a medication refill of his seizure meds. Pt states he went to his PCP on Friday and was suppose to get a refill but it was never called in. Pt states he has been out for 2 days and he is worried he will have a seizure soon.

## 2025-03-24 RX ORDER — LEVETIRACETAM 750 MG/1
TABLET ORAL
Qty: 360 TABLET | Refills: 3 | Status: SHIPPED | OUTPATIENT
Start: 2025-03-24

## 2025-03-24 NOTE — TELEPHONE ENCOUNTER
Noted, as I was out of office on Friday and seeing this for the first time.   Pt went to ER yesterday for the refill and it was sent in.     Sending in Rx refill to NP for approval and to send to pharmacy.

## 2025-03-26 LAB — LEVETIRACETAM SERPL-MCNC: 30.6 UG/ML (ref 10–40)

## 2025-05-19 DIAGNOSIS — G40.919 INTRACTABLE EPILEPSY WITHOUT STATUS EPILEPTICUS, UNSPECIFIED EPILEPSY TYPE (HCC): ICD-10-CM

## 2025-05-19 RX ORDER — LACOSAMIDE 50 MG/1
50 TABLET ORAL 2 TIMES DAILY
Qty: 180 TABLET | Refills: 1 | Status: SHIPPED | OUTPATIENT
Start: 2025-05-19 | End: 2026-02-13

## 2025-06-07 ENCOUNTER — APPOINTMENT (OUTPATIENT)
Facility: HOSPITAL | Age: 60
End: 2025-06-07
Payer: COMMERCIAL

## 2025-06-07 ENCOUNTER — HOSPITAL ENCOUNTER (EMERGENCY)
Facility: HOSPITAL | Age: 60
Discharge: HOME | End: 2025-06-08
Payer: COMMERCIAL

## 2025-06-07 VITALS
OXYGEN SATURATION: 98 % | HEART RATE: 90 BPM | BODY MASS INDEX: 28.32 KG/M2 | DIASTOLIC BLOOD PRESSURE: 79 MMHG | RESPIRATION RATE: 18 BRPM | SYSTOLIC BLOOD PRESSURE: 135 MMHG | TEMPERATURE: 98.6 F | HEIGHT: 65 IN | WEIGHT: 170 LBS

## 2025-06-07 DIAGNOSIS — R56.9 SEIZURE (HCC): Primary | ICD-10-CM

## 2025-06-07 DIAGNOSIS — S01.81XA FACIAL LACERATION, INITIAL ENCOUNTER: ICD-10-CM

## 2025-06-07 LAB
ALBUMIN SERPL-MCNC: 3.3 G/DL (ref 3.5–5)
ALBUMIN/GLOB SERPL: 0.8 (ref 1.1–2.2)
ALP SERPL-CCNC: 103 U/L (ref 45–117)
ALT SERPL-CCNC: 42 U/L (ref 12–78)
ANION GAP SERPL CALC-SCNC: 10 MMOL/L (ref 2–12)
AST SERPL-CCNC: 65 U/L (ref 15–37)
BASOPHILS # BLD: 0.07 K/UL (ref 0–0.1)
BASOPHILS NFR BLD: 0.8 % (ref 0–1)
BILIRUB SERPL-MCNC: 1.7 MG/DL (ref 0.2–1)
BUN SERPL-MCNC: 11 MG/DL (ref 6–20)
BUN/CREAT SERPL: 14 (ref 12–20)
CALCIUM SERPL-MCNC: 8.7 MG/DL (ref 8.5–10.1)
CHLORIDE SERPL-SCNC: 100 MMOL/L (ref 97–108)
CO2 SERPL-SCNC: 26 MMOL/L (ref 21–32)
CREAT SERPL-MCNC: 0.8 MG/DL (ref 0.7–1.3)
DIFFERENTIAL METHOD BLD: ABNORMAL
EOSINOPHIL # BLD: 0.02 K/UL (ref 0–0.4)
EOSINOPHIL NFR BLD: 0.2 % (ref 0–7)
ERYTHROCYTE [DISTWIDTH] IN BLOOD BY AUTOMATED COUNT: 13.8 % (ref 11.5–14.5)
GLOBULIN SER CALC-MCNC: 4.3 G/DL (ref 2–4)
GLUCOSE SERPL-MCNC: 82 MG/DL (ref 65–100)
HCT VFR BLD AUTO: 32.8 % (ref 36.6–50.3)
HGB BLD-MCNC: 11.9 G/DL (ref 12.1–17)
IMM GRANULOCYTES # BLD AUTO: 0.02 K/UL (ref 0–0.04)
IMM GRANULOCYTES NFR BLD AUTO: 0.2 % (ref 0–0.5)
LYMPHOCYTES # BLD: 2.75 K/UL (ref 0.8–3.5)
LYMPHOCYTES NFR BLD: 32.8 % (ref 12–49)
MAGNESIUM SERPL-MCNC: 2 MG/DL (ref 1.6–2.4)
MCH RBC QN AUTO: 28.2 PG (ref 26–34)
MCHC RBC AUTO-ENTMCNC: 36.3 G/DL (ref 30–36.5)
MCV RBC AUTO: 77.7 FL (ref 80–99)
MONOCYTES # BLD: 0.82 K/UL (ref 0–1)
MONOCYTES NFR BLD: 9.8 % (ref 5–13)
NEUTS SEG # BLD: 4.71 K/UL (ref 1.8–8)
NEUTS SEG NFR BLD: 56.2 % (ref 32–75)
NRBC # BLD: 0 K/UL (ref 0–0.01)
NRBC BLD-RTO: 0 PER 100 WBC
PLATELET # BLD AUTO: 235 K/UL (ref 150–400)
PMV BLD AUTO: 9.7 FL (ref 8.9–12.9)
POTASSIUM SERPL-SCNC: 3.7 MMOL/L (ref 3.5–5.1)
PROT SERPL-MCNC: 7.6 G/DL (ref 6.4–8.2)
RBC # BLD AUTO: 4.22 M/UL (ref 4.1–5.7)
SODIUM SERPL-SCNC: 136 MMOL/L (ref 136–145)
WBC # BLD AUTO: 8.4 K/UL (ref 4.1–11.1)

## 2025-06-07 PROCEDURE — 85025 COMPLETE CBC W/AUTO DIFF WBC: CPT

## 2025-06-07 PROCEDURE — 80053 COMPREHEN METABOLIC PANEL: CPT

## 2025-06-07 PROCEDURE — 83735 ASSAY OF MAGNESIUM: CPT

## 2025-06-07 PROCEDURE — 80235 DRUG ASSAY LACOSAMIDE: CPT

## 2025-06-07 PROCEDURE — 12013 RPR F/E/E/N/L/M 2.6-5.0 CM: CPT

## 2025-06-07 PROCEDURE — 99284 EMERGENCY DEPT VISIT MOD MDM: CPT

## 2025-06-07 PROCEDURE — 70450 CT HEAD/BRAIN W/O DYE: CPT

## 2025-06-07 PROCEDURE — 80177 DRUG SCRN QUAN LEVETIRACETAM: CPT

## 2025-06-07 PROCEDURE — 6370000000 HC RX 637 (ALT 250 FOR IP): Performed by: NURSE PRACTITIONER

## 2025-06-07 RX ORDER — LEVETIRACETAM 500 MG/1
1000 TABLET ORAL
Status: COMPLETED | OUTPATIENT
Start: 2025-06-07 | End: 2025-06-07

## 2025-06-07 RX ADMIN — LEVETIRACETAM 1000 MG: 500 TABLET, FILM COATED ORAL at 23:08

## 2025-06-07 RX ADMIN — Medication 3 ML: at 20:15

## 2025-06-07 ASSESSMENT — PAIN - FUNCTIONAL ASSESSMENT: PAIN_FUNCTIONAL_ASSESSMENT: NONE - DENIES PAIN
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
## 2025-06-08 NOTE — ED NOTES
Discharge instructions were given to the patient by ruperto james.     The patient left the Emergency Department alert and oriented and in no acute distress with 0 prescriptions. The patient was encouraged to call or return to the ED for worsening issues or problems and was encouraged to schedule a follow up appointment for continuing care.     Ambulation assessment completed before discharge.  Pt left Emergency Department ambulating at baseline with no ortho devices  Ortho device education: none    The patient verbalized understanding of discharge instructions and prescriptions, all questions were answered. The patient has no further concerns at this time.

## 2025-06-08 NOTE — ED NOTES
See triage note. Pt is on eliquis. Is unsure if UTD on tetanus.  Pt is alert and oriented x 4, RR even and unlabored, skin is warm and dry. Assesment completed and pt updated on plan of care.       Emergency Department Nursing Plan of Care       The Nursing Plan of Care is developed from the Nursing assessment and Emergency Department Attending provider initial evaluation.  The plan of care may be reviewed in the “ED Provider note”.    The Plan of Care was developed with the following considerations:   Patient / Family readiness to learn indicated by:verbalized understanding  Persons(s) to be included in education: patient  Barriers to Learning/Limitations:None    Signed     Abigail Blanco RN    6/7/2025   8:03 PM

## 2025-06-09 LAB — LEVETIRACETAM SERPL-MCNC: 28.4 UG/ML (ref 10–40)

## 2025-06-10 LAB — LACOSAMIDE SERPL-MCNC: 2.7 UG/ML (ref 5–10)

## 2025-06-10 ASSESSMENT — ENCOUNTER SYMPTOMS
ABDOMINAL PAIN: 0
SHORTNESS OF BREATH: 0
BACK PAIN: 0
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
## 2025-06-24 ENCOUNTER — OFFICE VISIT (OUTPATIENT)
Age: 60
End: 2025-06-24
Payer: COMMERCIAL

## 2025-06-24 VITALS
BODY MASS INDEX: 28 KG/M2 | DIASTOLIC BLOOD PRESSURE: 68 MMHG | HEIGHT: 64 IN | SYSTOLIC BLOOD PRESSURE: 134 MMHG | HEART RATE: 92 BPM | WEIGHT: 164 LBS | RESPIRATION RATE: 15 BRPM | OXYGEN SATURATION: 97 %

## 2025-06-24 DIAGNOSIS — R22.0 LEFT FACIAL SWELLING: ICD-10-CM

## 2025-06-24 DIAGNOSIS — F79 UNSPECIFIED INTELLECTUAL DISABILITIES: ICD-10-CM

## 2025-06-24 DIAGNOSIS — Z91.89 STROKE RISK: ICD-10-CM

## 2025-06-24 DIAGNOSIS — G40.919 INTRACTABLE EPILEPSY WITHOUT STATUS EPILEPTICUS, UNSPECIFIED EPILEPSY TYPE (HCC): Primary | ICD-10-CM

## 2025-06-24 PROCEDURE — 3078F DIAST BP <80 MM HG: CPT

## 2025-06-24 PROCEDURE — 3075F SYST BP GE 130 - 139MM HG: CPT

## 2025-06-24 PROCEDURE — 99215 OFFICE O/P EST HI 40 MIN: CPT

## 2025-06-24 ASSESSMENT — ENCOUNTER SYMPTOMS
ALLERGIC/IMMUNOLOGIC NEGATIVE: 1
FACIAL SWELLING: 1
RESPIRATORY NEGATIVE: 1
GASTROINTESTINAL NEGATIVE: 1
EYES NEGATIVE: 1
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
## 2025-06-25 NOTE — ASSESSMENT & PLAN NOTE
Stable without any strokelike symptoms.    His stroke risk factors include hypertension, dyslipidemia, on Eliquis for DVT.    He is to continue on atorvastatin 20 mg, and a apixaban 5 mg twice a day.    Patient is to continue to follow-up with his primary care provider and other specialists for his other comorbid condition as appropriate.
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
## 2025-06-30 ENCOUNTER — APPOINTMENT (OUTPATIENT)
Facility: HOSPITAL | Age: 60
End: 2025-06-30
Payer: COMMERCIAL

## 2025-06-30 ENCOUNTER — HOSPITAL ENCOUNTER (EMERGENCY)
Facility: HOSPITAL | Age: 60
Discharge: HOME | End: 2025-06-30
Payer: COMMERCIAL

## 2025-06-30 VITALS
OXYGEN SATURATION: 97 % | BODY MASS INDEX: 26.99 KG/M2 | SYSTOLIC BLOOD PRESSURE: 165 MMHG | TEMPERATURE: 97.7 F | HEART RATE: 90 BPM | HEIGHT: 65 IN | RESPIRATION RATE: 18 BRPM | WEIGHT: 162 LBS | DIASTOLIC BLOOD PRESSURE: 68 MMHG

## 2025-06-30 DIAGNOSIS — S09.90XA CLOSED HEAD INJURY, INITIAL ENCOUNTER: ICD-10-CM

## 2025-06-30 DIAGNOSIS — S22.42XA CLOSED FRACTURE OF MULTIPLE RIBS OF LEFT SIDE, INITIAL ENCOUNTER: Primary | ICD-10-CM

## 2025-06-30 PROCEDURE — 71101 X-RAY EXAM UNILAT RIBS/CHEST: CPT

## 2025-06-30 PROCEDURE — 70450 CT HEAD/BRAIN W/O DYE: CPT

## 2025-06-30 PROCEDURE — 99284 EMERGENCY DEPT VISIT MOD MDM: CPT

## 2025-06-30 ASSESSMENT — PAIN DESCRIPTION - ORIENTATION: ORIENTATION: LEFT

## 2025-06-30 ASSESSMENT — PAIN DESCRIPTION - ONSET: ONSET: ON-GOING

## 2025-06-30 ASSESSMENT — PAIN SCALES - GENERAL: PAINLEVEL_OUTOF10: 8

## 2025-06-30 ASSESSMENT — PAIN - FUNCTIONAL ASSESSMENT: PAIN_FUNCTIONAL_ASSESSMENT: PREVENTS OR INTERFERES SOME ACTIVE ACTIVITIES AND ADLS

## 2025-06-30 ASSESSMENT — PAIN DESCRIPTION - FREQUENCY: FREQUENCY: CONTINUOUS

## 2025-06-30 ASSESSMENT — PAIN DESCRIPTION - DESCRIPTORS: DESCRIPTORS: ACHING

## 2025-06-30 ASSESSMENT — PAIN DESCRIPTION - PAIN TYPE: TYPE: ACUTE PAIN

## 2025-06-30 ASSESSMENT — PAIN DESCRIPTION - LOCATION: LOCATION: RIB CAGE

## 2025-06-30 NOTE — DISCHARGE INSTRUCTIONS
For rib pain, take ibuprofen and Tylenol in an alternating fashion, taking 1000 milligrams of Tylenol every 8 hours and 600 mg of ibuprofen every 6 hours.  Follow-up with Ortho for further evaluation.  Return to ER with acute worsening symptoms such as chest pain, shortness of breath, abdominal pain, or other acute complaints. Thank you for choosing [facility] to be a part of your care today.
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
## 2025-06-30 NOTE — ED NOTES
Discharge instructions were given to the patient by [facility].     The patient left the Emergency Department alert and oriented and in no acute distress with 0 prescriptions. The patient was encouraged to call or return to the ED for worsening issues or problems and was encouraged to schedule a follow up appointment for continuing care.     Ambulation assessment completed before discharge.  Pt left Emergency Department at expected ambulatory status with no ortho devices  Ortho device education: none    The patient verbalized understanding of discharge instructions and prescriptions, all questions were answered. The patient has no further concerns at this time.

## 2025-08-09 DIAGNOSIS — I10 ESSENTIAL (PRIMARY) HYPERTENSION: ICD-10-CM

## 2025-08-11 RX ORDER — LOSARTAN POTASSIUM AND HYDROCHLOROTHIAZIDE 12.5; 5 MG/1; MG/1
1 TABLET ORAL DAILY
Qty: 90 TABLET | Refills: 3 | Status: SHIPPED | OUTPATIENT
Start: 2025-08-11